# Patient Record
Sex: MALE | Race: WHITE | NOT HISPANIC OR LATINO | Employment: OTHER | URBAN - METROPOLITAN AREA
[De-identification: names, ages, dates, MRNs, and addresses within clinical notes are randomized per-mention and may not be internally consistent; named-entity substitution may affect disease eponyms.]

---

## 2017-01-04 ENCOUNTER — HOSPITAL ENCOUNTER (OUTPATIENT)
Dept: LAB | Facility: HOSPITAL | Age: 72
Setting detail: SPECIMEN
Discharge: HOME | End: 2017-01-04
Attending: NURSE PRACTITIONER | Admitting: NURSE PRACTITIONER

## 2017-01-04 LAB
ALBUMIN SERPL-MCNC: 3.8 G/DL (ref 3.5–4.8)
ALBUMIN/GLOB SERPL: 1.2 {RATIO} (ref 1–1.7)
ALP SERPL-CCNC: 119 IU/L (ref 32–91)
ALT SERPL-CCNC: 22 IU/L (ref 17–63)
ANION GAP SERPL CALC-SCNC: 13.1 MMOL/L (ref 10–20)
AST SERPL-CCNC: 18 IU/L (ref 15–41)
BILIRUB SERPL-MCNC: 0.7 MG/DL (ref 0.3–1.2)
BUN SERPL-MCNC: 16 MG/DL (ref 8–20)
BUN/CREAT SERPL: 14.5 (ref 6.2–20.3)
CALCIUM SERPL-MCNC: 9.1 MG/DL (ref 8.9–10.3)
CHLORIDE SERPL-SCNC: 105 MMOL/L (ref 101–111)
CHOLEST SERPL-MCNC: 155 MG/DL
CHOLEST/HDLC SERPL: 3.5 {RATIO}
CONV CO2: 26 MMOL/L (ref 22–32)
CONV LDL CHOLESTEROL DIRECT: 81 MG/DL (ref 0–100)
CONV MICROALBUM.,U,RANDOM: 147 MG/L
CONV TOTAL PROTEIN: 6.9 G/DL (ref 6.1–7.9)
CREAT 24H UR-MCNC: 184.3 MG/DL
CREAT UR-MCNC: 1.1 MG/DL (ref 0.7–1.2)
GLOBULIN UR ELPH-MCNC: 3.1 G/DL (ref 2.5–3.8)
GLUCOSE SERPL-MCNC: 178 MG/DL (ref 65–99)
HDLC SERPL-MCNC: 45 MG/DL
LDLC/HDLC SERPL: 1.8 {RATIO}
LIPID INTERPRETATION: ABNORMAL
MICROALBUMIN/CREAT UR: 79.8 UG/MG
POTASSIUM SERPL-SCNC: 4.1 MMOL/L (ref 3.6–5.1)
SODIUM SERPL-SCNC: 140 MMOL/L (ref 136–144)
TRIGL SERPL-MCNC: 173 MG/DL
VLDLC SERPL CALC-MCNC: 29.8 MG/DL

## 2017-04-06 ENCOUNTER — HOSPITAL ENCOUNTER (OUTPATIENT)
Dept: LAB | Facility: HOSPITAL | Age: 72
Setting detail: SPECIMEN
Discharge: HOME | End: 2017-04-06
Attending: INTERNAL MEDICINE | Admitting: INTERNAL MEDICINE

## 2017-04-06 LAB
ALBUMIN SERPL-MCNC: 3.5 G/DL (ref 3.5–4.8)
ALBUMIN/GLOB SERPL: 1.1 {RATIO} (ref 1–1.7)
ALP SERPL-CCNC: 106 IU/L (ref 32–91)
ALT SERPL-CCNC: 18 IU/L (ref 17–63)
ANION GAP SERPL CALC-SCNC: 12.2 MMOL/L (ref 10–20)
AST SERPL-CCNC: 17 IU/L (ref 15–41)
BILIRUB SERPL-MCNC: 0.4 MG/DL (ref 0.3–1.2)
BUN SERPL-MCNC: 16 MG/DL (ref 8–20)
BUN/CREAT SERPL: 14.5 (ref 6.2–20.3)
CALCIUM SERPL-MCNC: 9.4 MG/DL (ref 8.9–10.3)
CHLORIDE SERPL-SCNC: 106 MMOL/L (ref 101–111)
CONV CO2: 28 MMOL/L (ref 22–32)
CONV MICROALBUM.,U,RANDOM: 358 MG/L
CONV TOTAL PROTEIN: 6.7 G/DL (ref 6.1–7.9)
CREAT 24H UR-MCNC: 116 MG/DL
CREAT UR-MCNC: 1.1 MG/DL (ref 0.7–1.2)
GLOBULIN UR ELPH-MCNC: 3.2 G/DL (ref 2.5–3.8)
GLUCOSE SERPL-MCNC: 114 MG/DL (ref 65–99)
MICROALBUMIN/CREAT UR: 308.6 UG/MG
POTASSIUM SERPL-SCNC: 4.2 MMOL/L (ref 3.6–5.1)
SODIUM SERPL-SCNC: 142 MMOL/L (ref 136–144)
TSH SERPL-ACNC: 3.69 UIU/ML (ref 0.34–5.6)

## 2018-01-09 ENCOUNTER — HOSPITAL ENCOUNTER (OUTPATIENT)
Dept: CARDIOLOGY | Facility: HOSPITAL | Age: 73
Discharge: HOME | End: 2018-01-09
Attending: INTERNAL MEDICINE | Admitting: INTERNAL MEDICINE

## 2018-01-29 ENCOUNTER — HOSPITAL ENCOUNTER (OUTPATIENT)
Dept: PREADMISSION TESTING | Facility: HOSPITAL | Age: 73
Discharge: HOME | End: 2018-01-29
Attending: INTERNAL MEDICINE | Admitting: INTERNAL MEDICINE

## 2018-01-29 LAB
ANION GAP SERPL CALC-SCNC: 11.7 MMOL/L (ref 10–20)
APTT BLD: 24.3 SEC (ref 24–31)
BASOPHILS # BLD AUTO: 0 10*3/UL (ref 0–0.2)
BASOPHILS NFR BLD AUTO: 1 % (ref 0–2)
BUN SERPL-MCNC: 17 MG/DL (ref 8–20)
BUN/CREAT SERPL: 14.2 (ref 6.2–20.3)
CALCIUM SERPL-MCNC: 9.2 MG/DL (ref 8.9–10.3)
CHLORIDE SERPL-SCNC: 103 MMOL/L (ref 101–111)
CONV CO2: 28 MMOL/L (ref 22–32)
CREAT UR-MCNC: 1.2 MG/DL (ref 0.7–1.2)
DIFFERENTIAL METHOD BLD: (no result)
EOSINOPHIL # BLD AUTO: 0.3 10*3/UL (ref 0–0.3)
EOSINOPHIL # BLD AUTO: 4 % (ref 0–3)
ERYTHROCYTE [DISTWIDTH] IN BLOOD BY AUTOMATED COUNT: 13.5 % (ref 11.5–14.5)
GLUCOSE SERPL-MCNC: 187 MG/DL (ref 65–99)
HCT VFR BLD AUTO: 43.4 % (ref 40–54)
HGB BLD-MCNC: 14.7 G/DL (ref 14–18)
INR PPP: 1
LYMPHOCYTES # BLD AUTO: 1.7 10*3/UL (ref 0.8–4.8)
LYMPHOCYTES NFR BLD AUTO: 26 % (ref 18–42)
MCH RBC QN AUTO: 30.3 PG (ref 26–32)
MCHC RBC AUTO-ENTMCNC: 33.9 G/DL (ref 32–36)
MCV RBC AUTO: 89.5 FL (ref 80–94)
MONOCYTES # BLD AUTO: 0.8 10*3/UL (ref 0.1–1.3)
MONOCYTES NFR BLD AUTO: 12 % (ref 2–11)
NEUTROPHILS # BLD AUTO: 3.8 10*3/UL (ref 2.3–8.6)
NEUTROPHILS NFR BLD AUTO: 57 % (ref 50–75)
NRBC BLD AUTO-RTO: 0 /100{WBCS}
NRBC/RBC NFR BLD MANUAL: 0 10*3/UL
PLATELET # BLD AUTO: 166 10*3/UL (ref 150–450)
PMV BLD AUTO: 8.6 FL (ref 7.4–10.4)
POTASSIUM SERPL-SCNC: 4.7 MMOL/L (ref 3.6–5.1)
PROTHROMBIN TIME: 10.9 SEC (ref 9.6–11.7)
RBC # BLD AUTO: 4.85 10*6/UL (ref 4.6–6)
SODIUM SERPL-SCNC: 138 MMOL/L (ref 136–144)
WBC # BLD AUTO: 6.6 10*3/UL (ref 4.5–11.5)

## 2018-02-02 RX ORDER — LEVOTHYROXINE SODIUM 0.12 MG/1
125 TABLET ORAL DAILY
COMMUNITY

## 2018-02-02 RX ORDER — LANSOPRAZOLE 30 MG/1
30 CAPSULE, DELAYED RELEASE ORAL DAILY
Status: ON HOLD | COMMUNITY
End: 2020-08-13

## 2018-02-02 RX ORDER — ATORVASTATIN CALCIUM 40 MG/1
40 TABLET, FILM COATED ORAL NIGHTLY
COMMUNITY

## 2018-02-02 RX ORDER — CLOPIDOGREL BISULFATE 75 MG/1
75 TABLET ORAL DAILY
COMMUNITY
End: 2019-09-10 | Stop reason: HOSPADM

## 2018-02-02 RX ORDER — AMLODIPINE BESYLATE 10 MG/1
5 TABLET ORAL DAILY
COMMUNITY
End: 2020-01-17

## 2018-02-02 RX ORDER — METFORMIN HYDROCHLORIDE 500 MG/1
1000 TABLET, EXTENDED RELEASE ORAL 2 TIMES DAILY
Status: ON HOLD | COMMUNITY
End: 2020-08-13

## 2018-02-02 RX ORDER — GLIMEPIRIDE 2 MG/1
2 TABLET ORAL
Status: ON HOLD | COMMUNITY
End: 2021-08-26

## 2018-02-02 RX ORDER — ASPIRIN 81 MG/1
81 TABLET ORAL DAILY
COMMUNITY
End: 2020-01-24

## 2018-02-02 RX ORDER — ISOSORBIDE MONONITRATE 30 MG/1
90 TABLET, EXTENDED RELEASE ORAL DAILY
COMMUNITY
End: 2020-02-28

## 2018-02-02 RX ORDER — LANOLIN ALCOHOL/MO/W.PET/CERES
1000 CREAM (GRAM) TOPICAL DAILY
COMMUNITY

## 2018-02-12 ENCOUNTER — OFFICE VISIT (OUTPATIENT)
Dept: CARDIAC SURGERY | Facility: CLINIC | Age: 73
End: 2018-02-12

## 2018-02-12 VITALS
OXYGEN SATURATION: 96 % | DIASTOLIC BLOOD PRESSURE: 80 MMHG | SYSTOLIC BLOOD PRESSURE: 122 MMHG | WEIGHT: 241 LBS | HEART RATE: 62 BPM

## 2018-02-12 DIAGNOSIS — Z95.1 H/O CORONARY ARTERY BYPASS SURGERY: Primary | ICD-10-CM

## 2018-02-12 PROCEDURE — 99024 POSTOP FOLLOW-UP VISIT: CPT | Performed by: THORACIC SURGERY (CARDIOTHORACIC VASCULAR SURGERY)

## 2018-02-12 RX ORDER — MELATONIN
2000 NIGHTLY
COMMUNITY

## 2018-02-13 NOTE — PROGRESS NOTES
[facility] CONSULT    Reason for Consultation: Possible redo surgical revascularization.    History of Present Illness:     This is a very pleasant 72 year old man who underwent a coronary bypass surgery, with a LIMA to LAD, over 20 years ago. Two years ago he again suffered angina and received a stent to his circumflex artery. Ten months ago, he began experiencing significant dyspnea, even on minor exertion. Left heart catheterization, performed on 1/30/18, showed progressive CAD; the RCA is occluded proximally and fills by L to R collaterals, there is a 90% lesion involving the proximal LAD, and there is a 70% in the main circumflex after takeoff of a large OM branch (also has received a stent). The patient's original LIMA is patient and fills the distal half of the LAD distribution. The course of the LIMA is exceedingly closed to the sternotomy wires.    The patient was referred to us for possible redo surgical revascularization.    Review of Systems   Constitutional: Positive for activity change and fatigue. Negative for appetite change, chills, diaphoresis, fever and unexpected weight change.   HENT: Negative for congestion, dental problem, hearing loss, mouth sores, nosebleeds, postnasal drip, rhinorrhea, sore throat and trouble swallowing.    Eyes: Negative for photophobia and visual disturbance.   Respiratory: Negative for apnea, cough, choking, chest tightness, shortness of breath, wheezing and stridor.    Cardiovascular: Negative for chest pain, palpitations and leg swelling.   Gastrointestinal: Negative for abdominal distention, abdominal pain, constipation, diarrhea, nausea and vomiting.   Endocrine: Negative for cold intolerance, heat intolerance and polydipsia.   Genitourinary: Negative for dysuria, hematuria and urgency.   Musculoskeletal: Negative for arthralgias, back pain, gait problem, joint swelling, myalgias, neck pain and neck stiffness.   Skin: Negative for color change, pallor, rash and wound.    Allergic/Immunologic: Negative for environmental allergies, food allergies and immunocompromised state.   Neurological: Negative for tremors, seizures, syncope, facial asymmetry, speech difficulty, weakness, light-headedness, numbness and headaches.   Hematological: Negative for adenopathy. Does not bruise/bleed easily.   Psychiatric/Behavioral: Negative for agitation, behavioral problems, confusion, decreased concentration, dysphoric mood, hallucinations, self-injury, sleep disturbance and suicidal ideas. The patient is not nervous/anxious and is not hyperactive.         Past Medical History:   Diagnosis Date   • Coronary artery disease    • Diabetes mellitus    • Hyperlipidemia    • Hypertension    • Hypothyroidism    • Stroke     X2     Past Surgical History:   Procedure Laterality Date   • CORONARY ANGIOPLASTY     • CORONARY ARTERY BYPASS GRAFT  1996    X3     Family History   Problem Relation Age of Onset   • Cancer Mother    • Heart valve disorder Father      Social History   Substance Use Topics   • Smoking status: Former Smoker   • Smokeless tobacco: Never Used   • Alcohol use Yes       (Not in a hospital admission)    Current Outpatient Prescriptions:   •  amLODIPine (NORVASC) 5 MG tablet, Take 5 mg by mouth Daily., Disp: , Rfl:   •  aspirin 81 MG EC tablet, Take 81 mg by mouth Daily., Disp: , Rfl:   •  atorvastatin (LIPITOR) 40 MG tablet, Take 40 mg by mouth Daily., Disp: , Rfl:   •  cholecalciferol (VITAMIN D3) 1000 units tablet, Take 1,000 Units by mouth Daily., Disp: , Rfl:   •  clopidogrel (PLAVIX) 75 MG tablet, Take 75 mg by mouth Daily., Disp: , Rfl:   •  glimepiride (AMARYL) 2 MG tablet, Take 2 mg by mouth Every Morning Before Breakfast., Disp: , Rfl:   •  Insulin Aspart (NOVOLOG SC), Inject  under the skin., Disp: , Rfl:   •  insulin NPH (humuLIN N,novoLIN N) 100 UNIT/ML injection, Inject  under the skin 2 (Two) Times a Day Before Meals., Disp: , Rfl:   •  isosorbide mononitrate (IMDUR) 30 MG 24  hr tablet, Take 30 mg by mouth Daily., Disp: , Rfl:   •  lansoprazole (PREVACID) 30 MG capsule, Take 30 mg by mouth Daily., Disp: , Rfl:   •  levothyroxine (SYNTHROID, LEVOTHROID) 125 MCG tablet, Take 125 mcg by mouth Daily., Disp: , Rfl:   •  metFORMIN ER (GLUCOPHAGE-XR) 500 MG 24 hr tablet, Take 500 mg by mouth 2 (Two) Times a Day., Disp: , Rfl:   •  vitamin B-12 (CYANOCOBALAMIN) 1000 MCG tablet, Take 1,000 mcg by mouth Daily., Disp: , Rfl:     Allergies:  Peanut-containing drug products    Objective      Vital Signs  Heart Rate:  [62] 62  BP: (122)/(80) 122/80    Flowsheet Rows         First Filed Value    Admission Height      Admission Weight  109 kg (241 lb) Documented at 02/12/2018 1221             Physical Exam   Constitutional: He is oriented to person, place, and time. He appears well-developed and well-nourished. No distress.   HENT:   Head: Normocephalic and atraumatic.   Mouth/Throat: No oropharyngeal exudate.   Eyes: EOM are normal. Pupils are equal, round, and reactive to light. No scleral icterus.   Neck: Normal range of motion. Neck supple. No JVD present. No tracheal deviation present. No thyromegaly present.   Cardiovascular: Normal rate.  Exam reveals no gallop and no friction rub.    Murmur heard.  Pulmonary/Chest: Effort normal. No respiratory distress. He has no wheezes. He has no rales. He exhibits no tenderness.   Abdominal: Soft. Bowel sounds are normal. He exhibits no distension and no mass. There is no tenderness. No hernia.   Musculoskeletal: He exhibits no edema or deformity.   Lymphadenopathy:     He has no cervical adenopathy.   Neurological: He is alert and oriented to person, place, and time. No cranial nerve deficit.   Skin: Skin is warm and dry. No rash noted. He is not diaphoretic. No erythema. No pallor.   Psychiatric: He has a normal mood and affect. His behavior is normal. Judgment and thought content normal.   Vitals reviewed.      Results Review:       Assessment/Plan:  This  is a pleasant 72 year old man with progressie CAD; only his LIMA to LAD is patient. Given his LIMA patency and the significant risk of LIMA injury on re-entry, I feel Mr Heredia is a poor surgical candidate. In addition, he has no discernable venous conduit available. I will discuss the option of further PCI with Dr. Rodriguez.      Jens Heller MD  02/12/18  11:39 PM

## 2018-07-17 ENCOUNTER — HOSPITAL ENCOUNTER (OUTPATIENT)
Dept: LAB | Facility: HOSPITAL | Age: 73
Discharge: HOME | End: 2018-07-17
Attending: INTERNAL MEDICINE | Admitting: INTERNAL MEDICINE

## 2019-06-05 ENCOUNTER — CONVERSION ENCOUNTER (OUTPATIENT)
Dept: FAMILY MEDICINE CLINIC | Facility: CLINIC | Age: 74
End: 2019-06-05

## 2019-06-05 VITALS
OXYGEN SATURATION: 96 % | HEART RATE: 72 BPM | SYSTOLIC BLOOD PRESSURE: 146 MMHG | WEIGHT: 243.4 LBS | HEIGHT: 71 IN | DIASTOLIC BLOOD PRESSURE: 78 MMHG | BODY MASS INDEX: 34.07 KG/M2

## 2019-06-06 NOTE — PROGRESS NOTES
Visit Type:  Follow-up Visit  Primary Care Provider:  David Melchor M.D.      History of Present Illness:  Followup 3 months - Metoprolol decreased milligram in March due to bradycardia  Pt not taking Ranexa/ cost containment / advised generic available.       History of Present Illness:    Dear Dr Melchor    Mr. Carroll Rodgers is a very pleasant 73 years old, gentleman with history of coronary artery disease, status post previous CABG in , diabetes, dyslipidemia, and hypertension.     He continues to have mild angina pectoris on minimal exertion with stable pattern.  He also complains of Fatigue and shortness of breath on exertion which has worsened.    .  Dr.Aftab Jens Heller cardiovascular surgeon evaluated him and  reviewed his cardiac catheterization films and thought that he was not  a good candidate for surgery because of significant danger of injury to the LIMA at the time of reoperation       PCI stenting with high risk has been considered   but patient has opted for medical therapy.  He does have LV systolic dysfunction with ejection fraction of 40%.  He is enrolled in E[facility]P program  and is still waiting.  Will increase his isosorbide to960   mg daily.    Ranexa has not helped him  and he has stopped taking him      A/P  1- CAd. previous CABG.  Extensive coronary artery disease.  Recurrent angina pectoris.  EECP can be considered.  Will further optimize his antianginal therapy by increasing isosorbide.further to 90 mg daily.  2-  bradycardia   Heart rate today was 72. His metoprolol was  decreased during his last visit.  3-  dyslipidemia.  Will check his lipid profile on his return visit.    Thank you very much for allowing us to participate the care of patients      Vital Signs:    Patient Profile:    73 Years Old Male  Height:     71 inches (180.34 cm)  Weight:     243.4 pounds  BMI:        33.94     O2 Sat:     96 %  Pulse rate: 72 / minute  BP Sittin / 78  (left arm)    Cuff size:   regular      Problems: Active problems were reviewed with the patient during this visit.  Medications: Medications were reviewed with the patient during this visit.  Allergies: Allergies were reviewed with the patient during this visit.        Vitals Entered By: Carole Mcghee LPN (2019 9:14 AM)      Past Medical History:     Reviewed history from 2015 and no changes required:        Diabetes Mellitus type 2         Hypertension        Thyroid Disorder         Coronary Artery Disease : S/P  PCI stent x5 - Cabg         Hyperlipidemia        C V A / Stroke  x 2    Past Surgical History:     Reviewed history from 2018 and no changes required:        right wrist surgery - screw         Left ankle surgery  - screw        Heart Catherizations: last done 7-1-15 at [facility] : 2018 at [facility]         C A B   3 Vessel Bypass          PCI x 5 : last done 7-1-15 at [facility] - Xience Alpine (drug-eluting stent) x 2 to RCA and Xience Alpine stent x 1 to mid circumflex         Cataract Extraction    Active Medications (reviewed today):  NITROSTAT 0.4 MG SUBLINGUAL TABLET SUBLINGUAL (NITROGLYCERIN) Take once a day by mouth.  ISOSORBIDE MONONITRATE ER 60 MG ORAL TABLET EXTENDED RELEASE 24 HOUR (ISOSORBIDE MONONITRATE) Take once a day by mouth.  MULTIVITAMINS TABS (MULTIPLE VITAMIN) Take one (1) tablet by mouth daily.  RANEXA 1000 MG ORAL TABLET EXTENDED RELEASE 12 HOUR (RANOLAZINE) Take one (1) tablet by mouth twice a day  NOVOLIN R SOLUTION (INSULIN REGULAR HUMAN SOLN) AC  B-12 1000 MCG ORAL CAPSULE (CYANOCOBALAMIN) Take one (1) tablet by mouth daily.  VITAMIN D3 2000 UNIT ORAL CAPSULE (CHOLECALCIFEROL) Take 1 tablet by mouth daily  METOPROLOL TARTRATE 50 MG ORAL TABLET (METOPROLOL TARTRATE) Take one (1) tablet by mouth twice a day  GLIMEPIRIDE 2 MG ORAL TABLET (GLIMEPIRIDE) Take 1 tablet by mouth daily  BD PEN NEEDLE KENROY U/F 32G X 4 MM (INSULIN PEN NEEDLE) use 5 times a day. E11.65  METFORMIN HCL   MG ORAL TABLET EXTENDED RELEASE 24 HOUR (METFORMIN HCL) Take one (1) tablet by mouth twice a day  NOVOLOG FLEXPEN 100 UNIT/ML SUBCUTANEOUS SOLUTION PEN-INJECTOR (INSULIN ASPART) 14 untis before breakfast and lunch, 18 units before supper. 1:30>140.  NOVOLIN N SUSPENSION (INSULIN NPH HUMAN (ISOPHANE) SUSP) BID  LIPITOR 40 MG ORAL TABLET (ATORVASTATIN CALCIUM) Take 1 tablet by mouth daily  NITROSTAT 0.4 MG SUBLINGUAL TABLET SUBLINGUAL (NITROGLYCERIN) use as directed  AMLODIPINE BESYLATE 5 MG ORAL TABLET (AMLODIPINE BESYLATE) Take once a day by mouth.  LISINOPRIL-HYDROCHLOROTHIAZIDE 20-12.5 MG ORAL TABLET (LISINOPRIL-HYDROCHLOROTHIAZIDE) Take one (1) tablet by mouth daily.  PREVACID 30 MG ORAL CAPSULE DELAYED RELEASE (LANSOPRAZOLE) Take 1 tablet by mouth daily prn  ASPIRIN 81 MG ORAL TABLET (ASPIRIN) Take 1 tablet by mouth daily  LEVOTHYROXINE SODIUM 112 MCG ORAL TABLET (LEVOTHYROXINE SODIUM) Take 1 tablet by mouth daily  PLAVIX 75 MG ORAL TABLET (CLOPIDOGREL BISULFATE) Take 1 tablet by mouth daily    Current Allergies (reviewed today):  * PEANUTS (Critical)    Family History Summary:      Reviewed history Last on 03/06/2019 and no changes required:06/05/2019      General Comments - FH:  FH Cancer-Mother   FH Heart Disease- Father   FH Diabetes- Mother    Social History:     Reviewed history from 01/21/2015 and no changes required:        Marital Status:         Children: 2        Occupation: Reitred        Risk Factors:     Smoked Tobacco Use:  Former smoker     Cigarettes:  Yes -- 3 pack(s) per day,      Year started:  age 13         Year quit:  35 yrs ago   Smokeless Tobacco Use:  Never  Passive smoke exposure:  no  Drug use:  no  Caffeine use:  4 drinks per day  Alcohol use:  yes     Type:  Occasionally - beer      Has patient --        Felt need to cut down:  no        Been annoyed by complaints:  no        Felt guilty about drinking:  no        Needed eye opener in the morning:  no     Counseled to  quit/cut down alcohol use:  no  Exercise:  yes     Times per week:  4     Type of Exercise:  walking    Family History Risk Factors:     Family History of MI in females < 65 years old:  no     Family History of MI in males < 55 years old:  no        Review of Systems   General: denies fevers, chills, sweats, anorexia, fatigue, malaise, weight loss  Eyes: denies blurring, diplopia, irritation, discharge, vision loss, eye pain, photophobia  Ear/Nose/Throat: denies ear pain or discharge, tinnitus, decreased hearing, nasal obstruction or discharge, nosebleeds, sore throat, hoarseness, dysphagia  Cardiovascular: Coronary artery disease.  Previous CABG.  PCI stenting of 1 of the saphenous vein grafts in 2015  Respiratory: Denies cough, dyspnea, excessive sputum, hemoptysis, wheezing  Gastrointestinal: Denies nausea, vomiting, diarrhea  Genitourinary: Erectile dysfunction  Musculoskeletal: denies back pain, joint pain, joint swelling, muscle cramps, muscle weakness, stiffness, arthritis  Skin: Denies dry skin, hair loss  Neurologic: denies transient paralysis, weakness, paresthesias, seizures, syncope, tremors, vertigo  Psychiatric: Denies anxiety, depression  Endocrine: Denies cold intolerance, heat intolerance  Hematologic/Lymphatic: Denies bleeding, abnormal bruising, fevers      Physical Exam    General:      well developed, well nourished, in no acute distress.    Neck:      no masses, thyromegaly, or abnormal cervical nodes.   no JVD. No carotid bruits  Lungs:      clear bilaterally to auscultation.    Heart:      non-displaced PMI, chest non-tender; regular rate and rhythm, S1, S2 without murmurs, rubs, or gallops  Pulses:      pulses normal in all 4 extremities.    Extremities:       no edema    Diabetes Management Exam:      Foot Exam (with socks and/or shoes not present):        Pulses:           pulses normal in all 4 extremities.        Blood Pressure:  Today's BP: 146/78 mm Hg    Labwork:   Most Recent Lab  Results:   LDL: 81 mg/dL 01/04/2017  HbA1c: : 8.3 % 04/06/2017        Plan   New medications:  METOPROLOL TARTRATE 50 MG ORAL TABLET -- Take 1 tablet by mouth daily  Start date: 08/22/2017  AMLODIPINE BESYLATE 5 MG ORAL TABLET -- Take 1/2  tablet by mouth daily (2.5mg)  Start date: 02/11/2015           Patient Instructions:  1)    Lipid profile on return visit in 6 months  2)  Please schedule a follow-up appointment in 6 months.                  Medication Administration    Orders Added:  1)  Ofc Vst, Est Level III [81939]  2)  Ofc Vst, Est Level III [48379]    ]      Electronically signed by SHANELL uLtz MD on 06/05/2019 at 9:53 AM  ________________________________________________________________________       Disclaimer: Converted Note message may not contain all data elements that existed in the [employer] source system. Please see [employer] for the original note details.

## 2019-08-21 ENCOUNTER — OFFICE VISIT (OUTPATIENT)
Dept: CARDIOLOGY | Facility: CLINIC | Age: 74
End: 2019-08-21

## 2019-08-21 VITALS
SYSTOLIC BLOOD PRESSURE: 150 MMHG | DIASTOLIC BLOOD PRESSURE: 70 MMHG | OXYGEN SATURATION: 97 % | HEART RATE: 60 BPM | BODY MASS INDEX: 34.44 KG/M2 | HEIGHT: 71 IN | RESPIRATION RATE: 20 BRPM | WEIGHT: 246 LBS

## 2019-08-21 DIAGNOSIS — R07.9 CHEST PAIN, UNSPECIFIED TYPE: ICD-10-CM

## 2019-08-21 DIAGNOSIS — I20.0 UNSTABLE ANGINA (HCC): ICD-10-CM

## 2019-08-21 DIAGNOSIS — I25.83 CORONARY ARTERY DISEASE DUE TO LIPID RICH PLAQUE: ICD-10-CM

## 2019-08-21 DIAGNOSIS — E78.2 MIXED HYPERLIPIDEMIA: ICD-10-CM

## 2019-08-21 DIAGNOSIS — I10 ESSENTIAL HYPERTENSION: ICD-10-CM

## 2019-08-21 DIAGNOSIS — Z79.4 TYPE 2 DIABETES MELLITUS WITHOUT COMPLICATION, WITH LONG-TERM CURRENT USE OF INSULIN (HCC): ICD-10-CM

## 2019-08-21 DIAGNOSIS — R06.09 DYSPNEA ON EXERTION: Primary | ICD-10-CM

## 2019-08-21 DIAGNOSIS — I25.10 CORONARY ARTERY DISEASE DUE TO LIPID RICH PLAQUE: ICD-10-CM

## 2019-08-21 DIAGNOSIS — E03.9 ACQUIRED HYPOTHYROIDISM: ICD-10-CM

## 2019-08-21 DIAGNOSIS — E11.9 TYPE 2 DIABETES MELLITUS WITHOUT COMPLICATION, WITH LONG-TERM CURRENT USE OF INSULIN (HCC): ICD-10-CM

## 2019-08-21 PROCEDURE — 99205 OFFICE O/P NEW HI 60 MIN: CPT | Performed by: INTERNAL MEDICINE

## 2019-08-21 PROCEDURE — 93000 ELECTROCARDIOGRAM COMPLETE: CPT | Performed by: INTERNAL MEDICINE

## 2019-08-21 RX ORDER — LISINOPRIL AND HYDROCHLOROTHIAZIDE 25; 20 MG/1; MG/1
1 TABLET ORAL DAILY
Status: ON HOLD | COMMUNITY
End: 2021-08-26

## 2019-08-21 RX ORDER — METOPROLOL TARTRATE 50 MG/1
25 TABLET, FILM COATED ORAL 2 TIMES DAILY
COMMUNITY
End: 2019-09-10 | Stop reason: HOSPADM

## 2019-08-22 PROBLEM — I20.0 UNSTABLE ANGINA: Status: ACTIVE | Noted: 2019-08-22

## 2019-08-22 NOTE — PROGRESS NOTES
Subjective:     Encounter Date:08/21/2019      Patient ID: Carroll Rodgers is a 74 y.o. male.    Chief Complaint:  Chief Complaint   Patient presents with   • Coronary Artery Disease   • Hypertension   • Hyperlipidemia   • Diabetes       HPI:  Carroll is a very pleasant 74-year-old patient self-referred.  He is previously seen cardiologist in [facility] in the past most recently was Dr. Lutz.   he has known coronary artery disease and is status post coronary artery bypass grafting in 1996 with a LIMA to the LAD and SVG to the RCA and circumflex artery.  He subsequently had total occlusion of his SVG graft to the obtuse marginal branch and underwent stenting with a drug-eluting stent 7/1/2015 as well as his SVG graft to the right coronary artery.  Subsequently he developed exertional and rest angina with his worst symptom being exertional dyspnea.  Cardiac catheterization performed 1/30/2018 showed critical stenosis of the jailed segment of the inferior branch of the circumflex obtuse marginal branch with what appeared to be 70% in-stent restenosis of the superior parent limb of the circumflex into the superior limb.  He was told that medical therapy was his best option.  Since then he has had worsening angina on Ranexa and worsening dyspnea.  He comes to me for second opinion    I personally reviewed the cardiac catheterization images from 1/30/2018 and confirmed the above results.  In addition he has a patent LIMA to the LAD with a distal apical 80% narrowing that is inconsequential.  He has a chronic total occlusion of his SVG graft to the his right coronary artery and again an occlusion of his SVG to the OM.  The circumflex OM native vessel is as described above.  His ECG today in the office showed normal sinus rhythm with APCs and a old left bundle branch block.  He has had no recent echo.    His chest pain syndrome is that of exertional angina often if not always associated with dyspnea with chronic stable  angina class III symptoms versus unstable angina given the increase in frequency.  He does not have associated nausea or diaphoresis.    The following portions of the patient's history were reviewed and updated as appropriate: allergies, current medications, past family history, past medical history, past social history, past surgical history and problem list.    Problem List:  Patient Active Problem List   Diagnosis   • Type 2 diabetes mellitus without complication, with long-term current use of insulin (CMS/[facility])   • Mixed hyperlipidemia   • Essential hypertension   • Acquired hypothyroidism   • Coronary artery disease due to lipid rich plaque       Past Medical History:  Past Medical History:   Diagnosis Date   • Coronary artery disease    • Diabetes mellitus (CMS/[facility])    • Hyperlipidemia    • Hypertension    • Hypothyroidism    • Stroke (CMS/[facility])     X2       Past Surgical History:  Past Surgical History:   Procedure Laterality Date   • CARDIAC CATHETERIZATION  01/30/2018    EF 40%, Distal LM 50% stenosis, Proximal LM 50% stenosis, Proximal LAD 95% stenosis, Mid % stenosis, Apical LAD 70% stenosis, Significant ISR at bifurcation, 90% stenosis at circumflex portoin, 90% stenosis at ostial OM, distal circumflex 80% stenosis, Mid % stenosis, SVG to % occluded at ostium, SVG to OM branches 100% occluded, LIMA to LAD has no significant disease   • CORONARY ARTERY BYPASS GRAFT  1996    X3   • CORONARY STENT PLACEMENT  07/01/2015    [facility] [employer] LARISA to RCA (X2) and Mid Circ (X1)-Dr. Rodriguez   • CORONARY STENT PLACEMENT  2006   • CORONARY STENT PLACEMENT  2010       Social History:  Social History     Socioeconomic History   • Marital status:      Spouse name: Not on file   • Number of children: Not on file   • Years of education: Not on file   • Highest education level: Not on file   Tobacco Use   • Smoking status: Former Smoker   • Smokeless tobacco: Never Used   Substance and Sexual  "Activity   • Alcohol use: Yes   • Drug use: No       Allergies:  Allergies   Allergen Reactions   • Peanut-Containing Drug Products          Review of Symptoms:  Constitutional: Patient afebrile no chills or unexpected weight changes  Respiratory: No cough, no wheezing does complain of dyspnea  Cardiovascular: Does complain chest pain, without palpitations, but associated dyspnea, orthopnea and no edema  Gastrointestinal: No nausea, vomiting, constipation or diarrhea.  No melena or dark stools    All other systems reviewed and are negative             Objective:         /70 (BP Location: Left arm, Patient Position: Sitting, Cuff Size: Large Adult)   Pulse 60   Resp 20   Ht 180.3 cm (71\")   Wt 112 kg (246 lb)   SpO2 97%   BMI 34.31 kg/m²     Physical exam  Constitutional: well-nourished, and appears stated age in no acute distress  PERRL: Conjunctiva clear, no pallor, anicteric  HENMT: normocephalic, normal dentition, no cyanosis or pallor  Neck:no bruits, or thrills and bilateral normal carotid upstroke. Normal jugular venous pressure  Cardiovascular: No parasternal heaves an non-displaced focal PMI. Normal rate and rhythm: no rub, gallop, murmur or click and normal S1 and S2; no lower or upper extremity edema.   Lungs: unlabored, no wheezing with no rales or rhonchi on auscultation.  Extremities: Warm, no clubbing, cyanosis, or edema. Full and equal peripheral pulses in extremities with no bruits appreciated.   Abdomen: soft, non-tender, non-distended  Musculoskeletal: no joint tenderness or swelling and no erythema  Skin: Warm and dry, non-erythematous   Neuro:alert and normal affect. Oriented to time, place and person.       In-Office Procedure(s):    ECG 12 Lead  Date/Time: 8/21/2019 1:22 PM  Performed by: Gonzalez Ochoa MD  Authorized by: Gonzalez Ochoa MD   Comparison: not compared with previous ECG   Previous ECG: no previous ECG available  Rhythm: sinus rhythm  Comments: " ECG today in the office showed normal sinus rhythm with APCs and a old left bundle branch block.             ASCVD RIsk Score::  The 10-year ASCVD risk score (Alecmerrick RODRIGUEZ Jr., et al., 2013) is: 55.7%    Values used to calculate the score:      Age: 74 years      Sex: Male      Is Non- : No      Diabetic: Yes      Tobacco smoker: No      Systolic Blood Pressure: 150 mmHg      Is BP treated: Yes      HDL Cholesterol: 45 mg/dL      Total Cholesterol: 155 mg/dL    Recent Radiology:  Imaging Results (most recent)     None          Lab Review:   No visits with results within 2 Month(s) from this visit.   Latest known visit with results is:   Hospital Outpatient Visit on 01/29/2018   Component Date Value   • WBC 01/29/2018 6.6    • RBC 01/29/2018 4.85    • Hemoglobin 01/29/2018 14.7    • Hematocrit 01/29/2018 43.4    • MCV 01/29/2018 89.5    • MCH 01/29/2018 30.3    • MCHC 01/29/2018 33.9    • RDW 01/29/2018 13.5    • Platelets 01/29/2018 166    • MPV 01/29/2018 8.6    • Differential Type 01/29/2018 AUTO    • Neutrophils Absolute 01/29/2018 3.8    • Lymphocytes Absolute 01/29/2018 1.7    • Monocytes Absolute 01/29/2018 0.8    • Eosinophils Absolute 01/29/2018 0.3    • Basophils Absolute 01/29/2018 0.0    • Neutrophil Rel % 01/29/2018 57    • Lymphocyte Rel % 01/29/2018 26    • Monocyte Rel % 01/29/2018 12*   • Eosinophil Rel % 01/29/2018 4*   • Basophil Rel % 01/29/2018 1    • nRBC 01/29/2018 0    • Absolute nRBC 01/29/2018 0    • Sodium 01/29/2018 138    • Potassium 01/29/2018 4.7    • Chloride 01/29/2018 103    • CO2 01/29/2018 28    • Glucose 01/29/2018 187*   • BUN 01/29/2018 17    • Creatinine 01/29/2018 1.2    • Anion Gap 01/29/2018 11.7    • BUN/Creatinine Ratio 01/29/2018 14.2    • GFR MDRD Non  Birdie* 01/29/2018 59*   • GFR MDRD  01/29/2018 >60    • Calcium 01/29/2018 9.2    • Protime 01/29/2018 10.9    • INR 01/29/2018 1.0    • PTT 01/29/2018 24.3               Invalid  input(s): ALKPO4                        Invalid input(s): LDLCALC                Assessment:          Diagnosis Plan   1. Dyspnea on exertion  Adult Transthoracic Echo Complete W/ Cont if Necessary Per Protocol   2. Coronary artery disease due to lipid rich plaque     3. Essential hypertension     4. Mixed hyperlipidemia     5. Type 2 diabetes mellitus without complication, with long-term current use of insulin (CMS/HCC)     6. Acquired hypothyroidism            Plan:         1. Dyspnea on exertion  Likely an anginal equivalent.  Will rule out severe LV systolic dysfunction or valvular heart disease as the etiology before PCI and stenting.  - Adult Transthoracic Echo Complete W/ Cont if Necessary Per Protocol; Future    2. Coronary artery disease due to lipid rich plaque  Culprit likely LONNIE bifurcating stenosis.   -will attempt LASER atherectomy and bifurcation stenting of OM1.    3. Essential hypertension  Well controlled on medical therapy.    4. Mixed hyperlipidemia  Well controlled on medical therapy.    5. Type 2 diabetes mellitus without complication, with long-term current use of insulin (CMS/HCC)  Well controlled on medical therapy.    6. Acquired hypothyroidism  Stable.    Greater than 60 minutes of face-to-face time was spent with the patient and family, of which greater than 50% was spent counseling specifically the risks and benefits of acute microinfarction given his unstable angina in the setting of known critical stenosis of his circumflex artery.  Patient knows to report to the ER should his chest pain recur.        Level of Care:                 Gonzalez Ochoa MD  08/22/19  .

## 2019-08-23 ENCOUNTER — HOSPITAL ENCOUNTER (OUTPATIENT)
Dept: CARDIOLOGY | Facility: HOSPITAL | Age: 74
Discharge: HOME | End: 2019-08-23
Admitting: INTERNAL MEDICINE

## 2019-08-23 VITALS
WEIGHT: 245 LBS | DIASTOLIC BLOOD PRESSURE: 80 MMHG | SYSTOLIC BLOOD PRESSURE: 160 MMHG | BODY MASS INDEX: 34.3 KG/M2 | HEIGHT: 71 IN

## 2019-08-23 DIAGNOSIS — R06.09 DYSPNEA ON EXERTION: ICD-10-CM

## 2019-08-23 PROCEDURE — 93306 TTE W/DOPPLER COMPLETE: CPT

## 2019-08-23 PROCEDURE — 93306 TTE W/DOPPLER COMPLETE: CPT | Performed by: INTERNAL MEDICINE

## 2019-08-23 PROCEDURE — 25010000002 SULFUR HEXAFLUORIDE MICROSPH 60.7-25 MG RECONSTITUTED SUSPENSION: Performed by: INTERNAL MEDICINE

## 2019-08-23 RX ADMIN — SULFUR HEXAFLUORIDE 3 ML: KIT at 12:30

## 2019-08-27 LAB
BH CV ECHO MEAS - EF(MOD-BP): 54 %
BH CV ECHO MEAS - LA DIMENSION(2D): 4.4 CM
MAXIMAL PREDICTED HEART RATE: 145 BPM
STRESS TARGET HR: 123 BPM

## 2019-08-28 ENCOUNTER — TELEPHONE (OUTPATIENT)
Dept: CARDIOLOGY | Facility: CLINIC | Age: 74
End: 2019-08-28

## 2019-08-28 DIAGNOSIS — Z01.818 PRE-OP TESTING: ICD-10-CM

## 2019-08-28 DIAGNOSIS — I20.0 UNSTABLE ANGINA PECTORIS (HCC): ICD-10-CM

## 2019-08-28 DIAGNOSIS — I25.10 CORONARY ARTERY DISEASE DUE TO LIPID RICH PLAQUE: Primary | ICD-10-CM

## 2019-08-28 DIAGNOSIS — I25.83 CORONARY ARTERY DISEASE DUE TO LIPID RICH PLAQUE: Primary | ICD-10-CM

## 2019-08-29 PROBLEM — I20.0 UNSTABLE ANGINA PECTORIS (HCC): Status: ACTIVE | Noted: 2019-08-29

## 2019-09-05 ENCOUNTER — LAB (OUTPATIENT)
Dept: LAB | Facility: HOSPITAL | Age: 74
End: 2019-09-05

## 2019-09-05 DIAGNOSIS — Z01.818 PRE-OP TESTING: ICD-10-CM

## 2019-09-05 DIAGNOSIS — I25.83 CORONARY ARTERY DISEASE DUE TO LIPID RICH PLAQUE: ICD-10-CM

## 2019-09-05 DIAGNOSIS — I25.10 CORONARY ARTERY DISEASE DUE TO LIPID RICH PLAQUE: ICD-10-CM

## 2019-09-05 LAB
ALBUMIN SERPL-MCNC: 3.8 G/DL (ref 3.5–4.8)
ALBUMIN/GLOB SERPL: 1.1 G/DL (ref 1–1.7)
ALP SERPL-CCNC: 123 U/L (ref 32–91)
ALT SERPL W P-5'-P-CCNC: 22 U/L (ref 17–63)
ANION GAP SERPL CALCULATED.3IONS-SCNC: 18.2 MMOL/L (ref 5–15)
AST SERPL-CCNC: 20 U/L (ref 15–41)
BASOPHILS # BLD AUTO: 0 10*3/MM3 (ref 0–0.2)
BASOPHILS NFR BLD AUTO: 0.8 % (ref 0–1.5)
BILIRUB SERPL-MCNC: 0.7 MG/DL (ref 0.3–1.2)
BUN BLD-MCNC: 13 MG/DL (ref 8–20)
BUN/CREAT SERPL: 10.8 (ref 6.2–20.3)
CALCIUM SPEC-SCNC: 9.2 MG/DL (ref 8.9–10.3)
CHLORIDE SERPL-SCNC: 104 MMOL/L (ref 101–111)
CO2 SERPL-SCNC: 24 MMOL/L (ref 22–32)
CREAT BLD-MCNC: 1.2 MG/DL (ref 0.7–1.2)
DEPRECATED RDW RBC AUTO: 41.6 FL (ref 37–54)
EOSINOPHIL # BLD AUTO: 0.3 10*3/MM3 (ref 0–0.4)
EOSINOPHIL NFR BLD AUTO: 5.1 % (ref 0.3–6.2)
ERYTHROCYTE [DISTWIDTH] IN BLOOD BY AUTOMATED COUNT: 13.2 % (ref 12.3–15.4)
GFR SERPL CREATININE-BSD FRML MDRD: 59 ML/MIN/1.73
GLOBULIN UR ELPH-MCNC: 3.5 GM/DL (ref 2.5–3.8)
GLUCOSE BLD-MCNC: 169 MG/DL (ref 65–99)
HCT VFR BLD AUTO: 44.7 % (ref 37.5–51)
HGB BLD-MCNC: 14.9 G/DL (ref 13–17.7)
INR PPP: 1.04 (ref 0.9–1.1)
LYMPHOCYTES # BLD AUTO: 1.8 10*3/MM3 (ref 0.7–3.1)
LYMPHOCYTES NFR BLD AUTO: 30.2 % (ref 19.6–45.3)
MCH RBC QN AUTO: 29.8 PG (ref 26.6–33)
MCHC RBC AUTO-ENTMCNC: 33.4 G/DL (ref 31.5–35.7)
MCV RBC AUTO: 89.2 FL (ref 79–97)
MONOCYTES # BLD AUTO: 0.7 10*3/MM3 (ref 0.1–0.9)
MONOCYTES NFR BLD AUTO: 11.1 % (ref 5–12)
NEUTROPHILS # BLD AUTO: 3.2 10*3/MM3 (ref 1.7–7)
NEUTROPHILS NFR BLD AUTO: 52.8 % (ref 42.7–76)
NRBC BLD AUTO-RTO: 0.1 /100 WBC (ref 0–0.2)
PLATELET # BLD AUTO: 167 10*3/MM3 (ref 140–450)
PMV BLD AUTO: 8.8 FL (ref 6–12)
POTASSIUM BLD-SCNC: 5.2 MMOL/L (ref 3.6–5.1)
PROT SERPL-MCNC: 7.3 G/DL (ref 6.1–7.9)
PROTHROMBIN TIME: 10.6 SECONDS (ref 9.6–11.7)
RBC # BLD AUTO: 5.01 10*6/MM3 (ref 4.14–5.8)
SODIUM BLD-SCNC: 141 MMOL/L (ref 136–144)
WBC NRBC COR # BLD: 6 10*3/MM3 (ref 3.4–10.8)

## 2019-09-05 PROCEDURE — 80053 COMPREHEN METABOLIC PANEL: CPT

## 2019-09-05 PROCEDURE — 85610 PROTHROMBIN TIME: CPT

## 2019-09-05 PROCEDURE — 85025 COMPLETE CBC W/AUTO DIFF WBC: CPT

## 2019-09-05 PROCEDURE — 36415 COLL VENOUS BLD VENIPUNCTURE: CPT

## 2019-09-09 ENCOUNTER — HOSPITAL ENCOUNTER (OUTPATIENT)
Facility: HOSPITAL | Age: 74
Discharge: HOME | End: 2019-09-10
Attending: INTERNAL MEDICINE | Admitting: INTERNAL MEDICINE

## 2019-09-09 DIAGNOSIS — I25.10 CORONARY ARTERY DISEASE DUE TO LIPID RICH PLAQUE: ICD-10-CM

## 2019-09-09 DIAGNOSIS — Z98.61 CAD S/P PERCUTANEOUS CORONARY ANGIOPLASTY: Primary | ICD-10-CM

## 2019-09-09 DIAGNOSIS — I20.0 UNSTABLE ANGINA PECTORIS (HCC): ICD-10-CM

## 2019-09-09 DIAGNOSIS — I25.83 CORONARY ARTERY DISEASE DUE TO LIPID RICH PLAQUE: ICD-10-CM

## 2019-09-09 DIAGNOSIS — I25.10 CAD S/P PERCUTANEOUS CORONARY ANGIOPLASTY: Primary | ICD-10-CM

## 2019-09-09 LAB
ACT BLD: 230 SECONDS (ref 89–137)
ACT BLD: 246 SECONDS (ref 89–137)
ACT BLD: 268 SECONDS (ref 89–137)
ACT BLD: 307 SECONDS (ref 89–137)
GLUCOSE BLDC GLUCOMTR-MCNC: 102 MG/DL (ref 70–105)
GLUCOSE BLDC GLUCOMTR-MCNC: 190 MG/DL (ref 70–105)

## 2019-09-09 PROCEDURE — C1725 CATH, TRANSLUMIN NON-LASER: HCPCS | Performed by: INTERNAL MEDICINE

## 2019-09-09 PROCEDURE — 92928 PRQ TCAT PLMT NTRAC ST 1 LES: CPT | Performed by: INTERNAL MEDICINE

## 2019-09-09 PROCEDURE — A9270 NON-COVERED ITEM OR SERVICE: HCPCS | Performed by: INTERNAL MEDICINE

## 2019-09-09 PROCEDURE — C1769 GUIDE WIRE: HCPCS | Performed by: INTERNAL MEDICINE

## 2019-09-09 PROCEDURE — C1874 STENT, COATED/COV W/DEL SYS: HCPCS | Performed by: INTERNAL MEDICINE

## 2019-09-09 PROCEDURE — C1885 CATH, TRANSLUMIN ANGIO LASER: HCPCS | Performed by: INTERNAL MEDICINE

## 2019-09-09 PROCEDURE — C1894 INTRO/SHEATH, NON-LASER: HCPCS | Performed by: INTERNAL MEDICINE

## 2019-09-09 PROCEDURE — 63710000001 ATORVASTATIN 40 MG TABLET: Performed by: INTERNAL MEDICINE

## 2019-09-09 PROCEDURE — C9600 PERC DRUG-EL COR STENT SING: HCPCS | Performed by: INTERNAL MEDICINE

## 2019-09-09 PROCEDURE — G0378 HOSPITAL OBSERVATION PER HR: HCPCS

## 2019-09-09 PROCEDURE — C1887 CATHETER, GUIDING: HCPCS | Performed by: INTERNAL MEDICINE

## 2019-09-09 PROCEDURE — C9603 PERC D-E COR STENT ATHER BR: HCPCS | Performed by: INTERNAL MEDICINE

## 2019-09-09 PROCEDURE — 85347 COAGULATION TIME ACTIVATED: CPT

## 2019-09-09 PROCEDURE — 25010000002 FENTANYL CITRATE (PF) 100 MCG/2ML SOLUTION: Performed by: INTERNAL MEDICINE

## 2019-09-09 PROCEDURE — 63710000001 METOPROLOL TARTRATE 25 MG TABLET: Performed by: INTERNAL MEDICINE

## 2019-09-09 PROCEDURE — 92934 PR PRQ TRLUML CORONARY STENT/ATH/ANGIO ADDL BRANCH: CPT | Performed by: INTERNAL MEDICINE

## 2019-09-09 PROCEDURE — 25010000002 MIDAZOLAM PER 1 MG: Performed by: INTERNAL MEDICINE

## 2019-09-09 PROCEDURE — 25010000002 HEPARIN (PORCINE) PER 1000 UNITS: Performed by: INTERNAL MEDICINE

## 2019-09-09 PROCEDURE — 82962 GLUCOSE BLOOD TEST: CPT

## 2019-09-09 PROCEDURE — 63710000001 VITAMIN B-12 1000 MCG TABLET: Performed by: INTERNAL MEDICINE

## 2019-09-09 PROCEDURE — 92933 PRQ TRLML C ATHRC ST ANGIOP1: CPT | Performed by: INTERNAL MEDICINE

## 2019-09-09 PROCEDURE — C1760 CLOSURE DEV, VASC: HCPCS | Performed by: INTERNAL MEDICINE

## 2019-09-09 PROCEDURE — 63710000001 INSULIN ISOPHANE HUMAN PER 5 UNITS: Performed by: INTERNAL MEDICINE

## 2019-09-09 PROCEDURE — C9602 PERC D-E COR STENT ATHER S: HCPCS | Performed by: INTERNAL MEDICINE

## 2019-09-09 PROCEDURE — 0 IOPAMIDOL PER 1 ML: Performed by: INTERNAL MEDICINE

## 2019-09-09 DEVICE — XIENCE SIERRA™ EVEROLIMUS ELUTING CORONARY STENT SYSTEM 3.00 MM X 28 MM / RAPID-EXCHANGE
Type: IMPLANTABLE DEVICE | Status: FUNCTIONAL
Brand: XIENCE SIERRA™

## 2019-09-09 DEVICE — XIENCE SIERRA™ EVEROLIMUS ELUTING CORONARY STENT SYSTEM 2.25 MM X 38 MM / RAPID-EXCHANGE
Type: IMPLANTABLE DEVICE | Status: FUNCTIONAL
Brand: XIENCE SIERRA™

## 2019-09-09 DEVICE — STNT CORNRY RESOLUTE ONYX RX 2X18MM: Type: IMPLANTABLE DEVICE | Status: FUNCTIONAL

## 2019-09-09 RX ORDER — ASPIRIN 325 MG
TABLET ORAL AS NEEDED
Status: DISCONTINUED | OUTPATIENT
Start: 2019-09-09 | End: 2019-09-09 | Stop reason: HOSPADM

## 2019-09-09 RX ORDER — AMLODIPINE BESYLATE 5 MG/1
5 TABLET ORAL DAILY
Status: DISCONTINUED | OUTPATIENT
Start: 2019-09-10 | End: 2019-09-10 | Stop reason: HOSPADM

## 2019-09-09 RX ORDER — FENTANYL CITRATE 50 UG/ML
INJECTION, SOLUTION INTRAMUSCULAR; INTRAVENOUS AS NEEDED
Status: DISCONTINUED | OUTPATIENT
Start: 2019-09-09 | End: 2019-09-09 | Stop reason: HOSPADM

## 2019-09-09 RX ORDER — PANTOPRAZOLE SODIUM 40 MG/1
40 TABLET, DELAYED RELEASE ORAL EVERY MORNING
Status: DISCONTINUED | OUTPATIENT
Start: 2019-09-10 | End: 2019-09-10 | Stop reason: HOSPADM

## 2019-09-09 RX ORDER — MIDAZOLAM HYDROCHLORIDE 1 MG/ML
INJECTION INTRAMUSCULAR; INTRAVENOUS AS NEEDED
Status: DISCONTINUED | OUTPATIENT
Start: 2019-09-09 | End: 2019-09-09 | Stop reason: HOSPADM

## 2019-09-09 RX ORDER — ATORVASTATIN CALCIUM 40 MG/1
40 TABLET, FILM COATED ORAL DAILY
Status: DISCONTINUED | OUTPATIENT
Start: 2019-09-09 | End: 2019-09-10 | Stop reason: HOSPADM

## 2019-09-09 RX ORDER — HEPARIN SODIUM 1000 [USP'U]/ML
INJECTION, SOLUTION INTRAVENOUS; SUBCUTANEOUS AS NEEDED
Status: DISCONTINUED | OUTPATIENT
Start: 2019-09-09 | End: 2019-09-09 | Stop reason: HOSPADM

## 2019-09-09 RX ORDER — LEVOTHYROXINE SODIUM 0.12 MG/1
125 TABLET ORAL
Status: DISCONTINUED | OUTPATIENT
Start: 2019-09-10 | End: 2019-09-10 | Stop reason: HOSPADM

## 2019-09-09 RX ORDER — ASPIRIN 81 MG/1
81 TABLET ORAL DAILY
Status: DISCONTINUED | OUTPATIENT
Start: 2019-09-09 | End: 2019-09-09 | Stop reason: SDUPTHER

## 2019-09-09 RX ORDER — ASPIRIN 81 MG/1
81 TABLET ORAL DAILY
Status: DISCONTINUED | OUTPATIENT
Start: 2019-09-10 | End: 2019-09-10 | Stop reason: HOSPADM

## 2019-09-09 RX ORDER — METFORMIN HYDROCHLORIDE 500 MG/1
1000 TABLET, EXTENDED RELEASE ORAL 2 TIMES DAILY
Status: DISCONTINUED | OUTPATIENT
Start: 2019-09-12 | End: 2019-09-10 | Stop reason: HOSPADM

## 2019-09-09 RX ORDER — NITROGLYCERIN 5 MG/ML
INJECTION, SOLUTION INTRAVENOUS AS NEEDED
Status: DISCONTINUED | OUTPATIENT
Start: 2019-09-09 | End: 2019-09-09 | Stop reason: HOSPADM

## 2019-09-09 RX ORDER — LANOLIN ALCOHOL/MO/W.PET/CERES
1000 CREAM (GRAM) TOPICAL DAILY
Status: DISCONTINUED | OUTPATIENT
Start: 2019-09-09 | End: 2019-09-10 | Stop reason: HOSPADM

## 2019-09-09 RX ORDER — SODIUM CHLORIDE 9 MG/ML
100 INJECTION, SOLUTION INTRAVENOUS CONTINUOUS
Status: DISCONTINUED | OUTPATIENT
Start: 2019-09-09 | End: 2019-09-10 | Stop reason: HOSPADM

## 2019-09-09 RX ORDER — GLIPIZIDE 5 MG/1
2.5 TABLET ORAL
Status: DISCONTINUED | OUTPATIENT
Start: 2019-09-10 | End: 2019-09-10 | Stop reason: HOSPADM

## 2019-09-09 RX ORDER — LIDOCAINE HYDROCHLORIDE 20 MG/ML
INJECTION, SOLUTION INFILTRATION; PERINEURAL AS NEEDED
Status: DISCONTINUED | OUTPATIENT
Start: 2019-09-09 | End: 2019-09-09 | Stop reason: HOSPADM

## 2019-09-09 RX ORDER — SODIUM CHLORIDE 9 MG/ML
30 INJECTION, SOLUTION INTRAVENOUS CONTINUOUS
Status: DISCONTINUED | OUTPATIENT
Start: 2019-09-09 | End: 2019-09-10 | Stop reason: HOSPADM

## 2019-09-09 RX ORDER — MELATONIN
1000 DAILY
Status: DISCONTINUED | OUTPATIENT
Start: 2019-09-10 | End: 2019-09-10 | Stop reason: HOSPADM

## 2019-09-09 RX ADMIN — METOPROLOL TARTRATE 25 MG: 25 TABLET, FILM COATED ORAL at 21:41

## 2019-09-09 RX ADMIN — ATORVASTATIN CALCIUM 40 MG: 40 TABLET, FILM COATED ORAL at 21:40

## 2019-09-09 RX ADMIN — SODIUM CHLORIDE 100 ML/HR: 0.9 INJECTION, SOLUTION INTRAVENOUS at 17:59

## 2019-09-09 RX ADMIN — Medication 1000 MCG: at 21:40

## 2019-09-09 RX ADMIN — SODIUM CHLORIDE 30 ML/HR: 900 INJECTION, SOLUTION INTRAVENOUS at 11:43

## 2019-09-09 RX ADMIN — INSULIN HUMAN 20 UNITS: 100 INJECTION, SUSPENSION SUBCUTANEOUS at 21:41

## 2019-09-09 NOTE — PLAN OF CARE
3CD Physical Therapy Note    Patient was not available for the therapy session at this time.  Reason not seen: Other (comment)(OR, ex lap with wash out under general anesthesia) (06/12/19 2003). Collaborated with RN, Ghazal, regarding therapy needing new orders following surgery under general anesthesia. DC PT 6/12/19.    Re-Attempt Plan: Other (comment)(with new PT/OT orders following surgery) (06/12/19 3742).     Problem: Patient Care Overview  Goal: Plan of Care Review  Outcome: Ongoing (interventions implemented as appropriate)   09/09/19 2739   Coping/Psychosocial   Plan of Care Reviewed With patient   Plan of Care Review   Progress improving

## 2019-09-09 NOTE — PLAN OF CARE
Problem: Patient Care Overview  Goal: Plan of Care Review  Outcome: Ongoing (interventions implemented as appropriate)   09/09/19 1910   Coping/Psychosocial   Plan of Care Reviewed With patient   Plan of Care Review   Progress improving

## 2019-09-10 ENCOUNTER — TELEPHONE (OUTPATIENT)
Dept: CARDIOLOGY | Facility: CLINIC | Age: 74
End: 2019-09-10

## 2019-09-10 VITALS
RESPIRATION RATE: 16 BRPM | HEART RATE: 63 BPM | WEIGHT: 246.91 LBS | TEMPERATURE: 97 F | SYSTOLIC BLOOD PRESSURE: 171 MMHG | OXYGEN SATURATION: 96 % | HEIGHT: 69 IN | BODY MASS INDEX: 36.57 KG/M2 | DIASTOLIC BLOOD PRESSURE: 71 MMHG

## 2019-09-10 PROBLEM — I25.10 CAD S/P PERCUTANEOUS CORONARY ANGIOPLASTY: Status: ACTIVE | Noted: 2019-09-10

## 2019-09-10 PROBLEM — I20.0 UNSTABLE ANGINA PECTORIS: Status: RESOLVED | Noted: 2019-08-29 | Resolved: 2019-09-10

## 2019-09-10 PROBLEM — Z98.61 CAD S/P PERCUTANEOUS CORONARY ANGIOPLASTY: Status: ACTIVE | Noted: 2019-09-10

## 2019-09-10 LAB
ANION GAP SERPL CALCULATED.3IONS-SCNC: 12.4 MMOL/L (ref 5–15)
BUN BLD-MCNC: 13 MG/DL (ref 8–20)
BUN/CREAT SERPL: 10 (ref 6.2–20.3)
CALCIUM SPEC-SCNC: 8.5 MG/DL (ref 8.9–10.3)
CHLORIDE SERPL-SCNC: 101 MMOL/L (ref 101–111)
CO2 SERPL-SCNC: 26 MMOL/L (ref 22–32)
CREAT BLD-MCNC: 1.3 MG/DL (ref 0.7–1.2)
GFR SERPL CREATININE-BSD FRML MDRD: 54 ML/MIN/1.73
GLUCOSE BLD-MCNC: 199 MG/DL (ref 65–99)
GLUCOSE BLDC GLUCOMTR-MCNC: 190 MG/DL (ref 70–105)
MAGNESIUM SERPL-MCNC: 1.6 MG/DL (ref 1.8–2.5)
POTASSIUM BLD-SCNC: 4.4 MMOL/L (ref 3.6–5.1)
SODIUM BLD-SCNC: 135 MMOL/L (ref 136–144)

## 2019-09-10 PROCEDURE — A9270 NON-COVERED ITEM OR SERVICE: HCPCS | Performed by: INTERNAL MEDICINE

## 2019-09-10 PROCEDURE — 99217 PR OBSERVATION CARE DISCHARGE MANAGEMENT: CPT | Performed by: NURSE PRACTITIONER

## 2019-09-10 PROCEDURE — 83735 ASSAY OF MAGNESIUM: CPT | Performed by: NURSE PRACTITIONER

## 2019-09-10 PROCEDURE — 63710000001 METOPROLOL TARTRATE 25 MG TABLET: Performed by: INTERNAL MEDICINE

## 2019-09-10 PROCEDURE — G0378 HOSPITAL OBSERVATION PER HR: HCPCS

## 2019-09-10 PROCEDURE — 80048 BASIC METABOLIC PNL TOTAL CA: CPT | Performed by: NURSE PRACTITIONER

## 2019-09-10 PROCEDURE — 63710000001 VITAMIN D 1000 UNITS TABLET: Performed by: INTERNAL MEDICINE

## 2019-09-10 PROCEDURE — 63710000001 AMLODIPINE 5 MG TABLET: Performed by: INTERNAL MEDICINE

## 2019-09-10 PROCEDURE — 63710000001 ASPIRIN 81 MG TABLET DELAYED-RELEASE: Performed by: INTERNAL MEDICINE

## 2019-09-10 PROCEDURE — 63710000001 ISOSORBIDE MONONITRATE 30 MG TABLET SUSTAINED-RELEASE 24 HOUR: Performed by: INTERNAL MEDICINE

## 2019-09-10 PROCEDURE — 63710000001 LEVOTHYROXINE 125 MCG TABLET: Performed by: INTERNAL MEDICINE

## 2019-09-10 PROCEDURE — 82962 GLUCOSE BLOOD TEST: CPT

## 2019-09-10 PROCEDURE — 63710000001 INSULIN ISOPHANE HUMAN PER 5 UNITS: Performed by: INTERNAL MEDICINE

## 2019-09-10 PROCEDURE — 63710000001 TICAGRELOR 90 MG TABLET: Performed by: INTERNAL MEDICINE

## 2019-09-10 PROCEDURE — 63710000001 PANTOPRAZOLE 40 MG TABLET DELAYED-RELEASE: Performed by: INTERNAL MEDICINE

## 2019-09-10 PROCEDURE — 63710000001 VITAMIN B-12 1000 MCG TABLET: Performed by: INTERNAL MEDICINE

## 2019-09-10 PROCEDURE — 63710000001 GLIPIZIDE 5 MG TABLET: Performed by: INTERNAL MEDICINE

## 2019-09-10 RX ADMIN — ISOSORBIDE MONONITRATE 90 MG: 30 TABLET, EXTENDED RELEASE ORAL at 07:22

## 2019-09-10 RX ADMIN — TICAGRELOR 90 MG: 90 TABLET ORAL at 07:24

## 2019-09-10 RX ADMIN — AMLODIPINE BESYLATE 5 MG: 5 TABLET ORAL at 07:21

## 2019-09-10 RX ADMIN — GLIPIZIDE 2.5 MG: 5 TABLET ORAL at 07:22

## 2019-09-10 RX ADMIN — Medication 1000 MCG: at 07:25

## 2019-09-10 RX ADMIN — ASPIRIN 81 MG: 81 TABLET, DELAYED RELEASE ORAL at 07:24

## 2019-09-10 RX ADMIN — METOPROLOL TARTRATE 25 MG: 25 TABLET, FILM COATED ORAL at 07:24

## 2019-09-10 RX ADMIN — VITAMIN D 1000 UNITS: 25 TAB ORAL at 07:24

## 2019-09-10 RX ADMIN — LEVOTHYROXINE SODIUM 125 MCG: 125 TABLET ORAL at 07:23

## 2019-09-10 RX ADMIN — INSULIN HUMAN 20 UNITS: 100 INJECTION, SUSPENSION SUBCUTANEOUS at 07:20

## 2019-09-10 RX ADMIN — PANTOPRAZOLE SODIUM 40 MG: 40 TABLET, DELAYED RELEASE ORAL at 07:21

## 2019-09-10 NOTE — DISCHARGE SUMMARY
[facility] [place] [facility]    DISCHARGE SUMMARY      Patient Name: Carroll Rodgers  :1945  74 y.o.    Date of Admit: 2019  Date of Discharge:  9/10/2019    Discharge Diagnosis:  Problem List Items Addressed This Visit        Cardiovascular and Mediastinum    Coronary artery disease due to lipid rich plaque    Relevant Medications    metoprolol tartrate (LOPRESSOR) 25 MG tablet    ticagrelor (BRILINTA) 90 MG tablet tablet    Other Relevant Orders    Cardiac Catheterization/Vascular Study (Completed)    CAD S/P percutaneous coronary angioplasty - Primary    Relevant Medications    metoprolol tartrate (LOPRESSOR) 25 MG tablet    ticagrelor (BRILINTA) 90 MG tablet tablet    Other Relevant Orders    Referral to Cardiac Rehab    Basic Metabolic Panel    RESOLVED: Unstable angina pectoris (CMS/HCC)    Relevant Medications    metoprolol tartrate (LOPRESSOR) 25 MG tablet    ticagrelor (BRILINTA) 90 MG tablet tablet    Other Relevant Orders    Cardiac Catheterization/Vascular Study (Completed)        1) CAD with remote CABG/PCI s/p laser atherectomy with PCI and LARISA to first and second obtuse marginal branch and proximal circumflex and complete left main.  - continue DAPT with aspirin and brilinta, with reduce BB given bradycardia, continue high dose statin  - 2D echo 2019 showed EF = 54% with grade 1 diastolic dysfunction and no significant VHD.    2) HTN    3) HLD    4) DM    5) Renal insufficiency  - CKD?, unknown baseline Cr  - Cr 1.20 --> 1.30  - hold ACEi/HCTZ today, do not resume glucophage for 48 post cath  - Check BMP in one week.    6) hx CVA    7) Hx hypothyroidism    Hospital Course:   Carroll is a very pleasant 74-year-old patient self-referred.  He is previously seen cardiologist in [facility] in the past most recently was Dr. Lutz.   he has known coronary artery disease and is status post coronary artery bypass grafting in  with a LIMA to the LAD and SVG to the RCA and circumflex artery.   He subsequently had total occlusion of his SVG graft to the obtuse marginal branch and underwent stenting with a drug-eluting stent 7/1/2015 as well as his SVG graft to the right coronary artery.  Subsequently he developed exertional and rest angina with his worst symptom being exertional dyspnea.  Cardiac catheterization performed 1/30/2018 showed critical stenosis of the jailed segment of the inferior branch of the circumflex obtuse marginal branch with what appeared to be 70% in-stent restenosis of the superior parent limb of the circumflex into the superior limb.  He was told that medical therapy was his best option.  Since then he has had worsening angina on Ranexa and worsening dyspnea.  He comes to me for second opinion     I personally reviewed the cardiac catheterization images from 1/30/2018 and confirmed the above results.  In addition he has a patent LIMA to the LAD with a distal apical 80% narrowing that is inconsequential.  He has a chronic total occlusion of his SVG graft to the his right coronary artery and again an occlusion of his SVG to the OM.  The circumflex OM native vessel is as described above.  His ECG today in the office showed normal sinus rhythm with APCs and a old left bundle branch block.  He has had no recent echo.     His chest pain syndrome is that of exertional angina often if not always associated with dyspnea with chronic stable angina class III symptoms versus unstable angina given the increase in frequency.  He does not have associated nausea or diaphoresis.     The following portions of the patient's history were reviewed and updated as appropriate: allergies, current medications, past family history, past medical history, past social history, past surgical history and problem list.    Patient underwent left heart catheterization with successful laser atherectomy and PCI with LARISA to first and second obtuse marginal branch and proximal circumflex and complete left main.   Patient was observed overnight in the [facility]PC[facility] unit.  Patient tells me he felt SOA overnight.  Patient appears compensated with respect to volume.  Telemetry showed SB down to 39 bpm though patient also describes side effects after taking brilinta.  He has ambulated in the hallway without difficulty today and feels well this am.  Patient has no post cath groin hematoma and renal function is stable but will check BMP in one week.  As d/w Dr. Ochoa, patient is planned for discharge home today.      Procedures Performed  Procedure(s):  Stent LARISA coronary  Atherectomy-coronary    Indications     Coronary artery disease due to lipid rich plaque [I25.10, I25.83 (ICD-10-CM)]   Unstable angina pectoris (CMS/HCC) [I20.0 (ICD-10-CM)]       Conclusion     Indication for procedure:     1.  Unstable angina  2.  Known critical in-stent restenosis of the circumflex artery with failed bypass graft and failed stenting in the past.  3.  Multiple coronary artery risk factors     Procedures performed:     1.  Laser atherectomy  2.  Percutaneous coronary intervention involving drug-eluting stent placement to the first and second obtuse marginal branch of left circumflex artery (superior limb, 2.25 x 38 mm Xience drug-eluting stent overlapped with a 3.0 x 28 mm Xience drug-eluting stent; inferior limb, 2.0 x 18mm Florence drug-eluting stent).  3.  Percutaneous coronary intervention involving drug-eluting stent placement to the left main into the distal left main and ostial proximal circumflex (3.0 x 28 mm Xience drug-eluting stent).     History:  This is a 74-year-old male patient who presented with known coronary artery disease with bypass grafts the most salient of which was the SVG graft to the obtuse marginal branch which has since occluded.  The patient underwent stenting to the circumflex artery and now returns with severe in-stent restenosis with complex bifurcation disease in the first and second obtuse marginal branch of left  circumflex flex artery.  He has been having unstable angina that is been worsening over the past 6 weeks.  Therefore the patient is being brought to cardiac catheterization lab for laser atherectomy and PCI and stenting of the first and second obtuse marginal branch left circumflex flex artery.     Description of procedure:     Patient had the risks and benefits of the procedure explained to them in detail after which informed consent was obtained.  Patient was then brought to the cardiac catheterization lab and placed on the table in supine position.  Next the right groin was prepped and draped in sterile fashion.  Next using modified Seldinger technique and a 21-gauge micro puncture needle, the right femoral artery was cannulated without difficulty and a 7 Japanese sheath was inserted and flushed with heparinized saline.  Next using 7 Japanese EBU 4 guide catheter advanced over an 0.035 guidewire to the level the aortic root and then used to selectively engage the respective left main coronary ostia, multiple cineangiographic images were obtained all the appropriate projections using hand-injection of nonionic contrast sterile.  The culprit was identified as per above a 70% bifurcating lesion into the first and second obtuse marginal branch of left circumflex artery due to re-stenosis of the previously placed stent with 70% disease in the mid superior limb and 80% disease in the mid inferior limb with intervening 60% disease.  There is also a proximal circumflex into the distal left main 60% calcified stenosis.  Therefore a run-through wire was used to wire the inferior limb and a whisper export into the superior limb predilatation into the inferior limb was performed using a 2.0 mm NC trek balloon followed by a 2.0 m angioscope cutting balloon followed by the same predilatation in the superior limb with the same balloons.  Next laser atherectomy was performed at a setting of 40 and 40.  3 separate passes were made.   Next bifurcation kissing balloon predilatation was then performed at the bifurcating superior and inferior limb of the first and second obtuse marginal branches.  We were able to reconstruct the walter.  Stenting was then performed into the inferior limb using a 2.0 x 18 mm pato drug-eluting stent deployed at nominal pressure and postdilated to greater than 2.2 mm with a 2.2 mm NC trek balloon at 14 delia.  Stenting into the superior limb was then performed which extended into the proximal circumflex was performed using a 2.25 x 38 mm Xience drug-eluting stent deployed at nominal pressure.  Postdilatation was then performed along the distal stented segment with a 2.5 mm NC trek balloon and then again after rewiring into the jailed inferior limb 1.5 mm balloon was used to open the strut followed by kissing balloon postdilatation into the inferior and superior limbs with a 2.5 mm NC trek balloon in the superior limb and circumflex and a 2.25 mm NC trek balloon into the inferior limb and circumflex both at 12 to 14 delia.  A we then overlapped 3.0 x 28 mm Xience drug-eluting stent into the proximal circumflex and ostial proximal left main coronary artery.  Postdilatation was then performed using a 3.0 x 20 mm NC trek balloon at 20 delia along the stented segment of the 28 mm stent.  Final cine angiographic imaging revealed an excellent angiographic result with preserved MONEI-3 flow into the superior and inferior limbs of the circumflex (MONIE-3 prior and MONIE-3 post).  There is 0% residual stenosis in the stented segments.  There is no remaining stenosis of the jailed inferior ostial limb of the second obtuse marginal branch.     Patient tolerated the procedure well there were no complications.     During the case, conscious sedation monitoring was more than 30 min.     At the end of the case a 6 Danish pro-glide device was deployed in the right groin for right groin hemostasis     Findings     Severe and critical stenosis of  the circumflex first and second obtuse marginal branch and distal left main into the circumflex.     Conclusions:     Successful laser atherectomy, PCI and stenting of the severe critical lesions as described above: First and second obtuse marginal branch and proximal circumflex and complete left main     Recommendations:     To need dual antiplatelet therapy indefinitely.  Optimize medical therapy for secondary prevention of ischemic heart disease.                Consults     No orders found for last 30 day(s).          Pertinent Test Results:   Results from last 7 days   Lab Units 09/10/19  0501 09/05/19  1041   SODIUM mmol/L 135* 141   POTASSIUM mmol/L 4.4 5.2*   CHLORIDE mmol/L 101 104   CO2 mmol/L 26.0 24.0   BUN mg/dL 13 13   CREATININE mg/dL 1.30* 1.20   CALCIUM mg/dL 8.5* 9.2   BILIRUBIN mg/dL  --  0.7   ALK PHOS U/L  --  123*   ALT (SGPT) U/L  --  22   AST (SGOT) U/L  --  20   GLUCOSE mg/dL 199* 169*         @LABRCNT(bnp)@  Results from last 7 days   Lab Units 09/05/19  1041   WBC 10*3/mm3 6.00   HEMOGLOBIN g/dL 14.9   HEMATOCRIT % 44.7   PLATELETS 10*3/mm3 167     Results from last 7 days   Lab Units 09/05/19  1041   INR  1.04     Results from last 7 days   Lab Units 09/10/19  0501   MAGNESIUM mg/dL 1.6*           ECHOCARDIOGRAM:    Results for orders placed during the hospital encounter of 08/23/19   Adult Transthoracic Echo Complete W/ Cont if Necessary Per Protocol    Narrative · Left ventricular systolic function is normal.  · Left ventricular diastolic dysfunction (grade I a) consistent with   impaired relaxation.     Normal LV/RV function, + grade 1 diastolic dysfunction, no significant   regional abn seen, no significant valvulopathy seen, normal PASP and RA   pressure estimated.          Physical Exam  Neuro: AAOx3  CV: Distant S1S2 RRR, no murmur  Resp: non-labored, CTA  GI: BS+ hyperactive, abd soft  Ext: pedal palp, no edema, right groin without hematoma  Tele: SR/SB down to 39 bpm overnight  with rare PVCs    Condition on Discharge: stable    Discharge Medications     Discharge Medications      New Medications      Instructions Start Date   ticagrelor 90 MG tablet tablet  Commonly known as:  BRILINTA   90 mg, Oral, Every 12 Hours Scheduled         Changes to Medications      Instructions Start Date   metoprolol tartrate 25 MG tablet  Commonly known as:  LOPRESSOR  What changed:    · medication strength  · how much to take  · additional instructions   12.5 mg, Oral, 2 Times Daily         Continue These Medications      Instructions Start Date   amLODIPine 10 MG tablet  Commonly known as:  NORVASC   5 mg, Oral, Daily      aspirin 81 MG EC tablet   81 mg, Oral, Daily      atorvastatin 40 MG tablet  Commonly known as:  LIPITOR   40 mg, Oral, Daily      cholecalciferol 1000 units tablet  Commonly known as:  VITAMIN D3   1,000 Units, Oral, Daily      glimepiride 2 MG tablet  Commonly known as:  AMARYL   2 mg, Oral, Every Morning Before Breakfast      insulin  UNIT/ML injection  Commonly known as:  humuLIN N,novoLIN N   20 Units, Subcutaneous, 2 Times Daily      isosorbide mononitrate 30 MG 24 hr tablet  Commonly known as:  IMDUR   90 mg, Oral, Daily      lansoprazole 30 MG capsule  Commonly known as:  PREVACID   30 mg, Oral, Daily      levothyroxine 125 MCG tablet  Commonly known as:  SYNTHROID, LEVOTHROID   125 mcg, Oral, Daily      lisinopril-hydrochlorothiazide 20-12.5 MG per tablet  Commonly known as:  PRINZIDE,ZESTORETIC   1 tablet, Oral, Daily      metFORMIN  MG 24 hr tablet  Commonly known as:  GLUCOPHAGE-XR   1,000 mg, Oral, 2 Times Daily      NOVOLOG SC   5 Units, Subcutaneous, 3 Times Daily With Meals      vitamin B-12 1000 MCG tablet  Commonly known as:  CYANOCOBALAMIN   1,000 mcg, Oral, Daily         Stop These Medications    clopidogrel 75 MG tablet  Commonly known as:  PLAVIX            Discharge Diet:   Diet Instructions     Diet: Cardiac, Consistent Carbohydrate      Discharge  Diet:   Cardiac  Consistent Carbohydrate             Activity at Discharge:   Activity Instructions     Bathing Restrictions      For one week    Type of Restriction:  Bathing    Bathing Restrictions:  No Tub Bath    Driving Restrictions      Type of Restriction:  Driving    Driving Restrictions:  No Driving (Time Limited)    Length:  Other    Indicate Length of Restriction:  3 days post heart catheterization    Lifting Restrictions      Type of Restriction:  Lifting    Lifting Restrictions:  Lifting Restriction (Indicate Limit)    Weight Limit (Pounds):  4    Length of Lifting Restriction:  3 days post heart catherization          Discharge disposition: home    Follow-up Appointments  Future Appointments   Date Time Provider Department Center   9/18/2019 10:30 AM KAYLA Lutz MD [facility] None   9/27/2019  9:00 AM Gonzalez Ochoa MD [facility]     Additional Instructions for the Follow-ups that You Need to Schedule     Referral to Cardiac Rehab   As directed      Basic Metabolic Panel    Sep 16, 2019 (Approximate)      Please fax results [phone] Attn: Dr. Ochoa  Dx: post-cath    Order Comments:  Please fax results [phone] Attn: Dr. Ochoa Dx: post-cath                Test Results Pending at Discharge       BAYRON Clay, [facility]    09/10/19  10:20 AM    Time: > 30 minutes spent on discharge

## 2019-09-10 NOTE — DISCHARGE INSTRUCTIONS
Post Cath Instructions        1) Drink plenty of fluids for the next 24 hours.  This helps to eliminate the dye used in your procedure through urination.  You may resume a normal diet; however, try to avoid foods that would cause gas or constipation.    2) Sedative medication given to you during your catheterization may decrease your judgement and reaction time for up to 24-48 hours.  Therefore:  a. DO NOT drive or operate hazardous machinery (48 hours)  b. DO NOT consume alcoholic beverages  c. DO NOT make any important/legal decisions  d. Have someone stay with you for at least 24 hours    3) To allow proper healing and prevent bleeding, the following activities are to be strictly avoided for the next 24-48 hours:  a. Excessive bending at wound site  b. Straining (anything that would tense up muscles around the affected puncture site)  c. Lifting objects greater than 5 pounds, pushing, or pulling for 5 days    i. For Groin Cases:  1. Refrain from sexual activity  2. Refrain from running or vigorous walking  3. No prolonged sitting or standing  4. Limit stair climbing as much as possible    4) Keep the puncture site clean and dry.  You may remove the dressing tomorrow and replace it with a band-aid for at least one additional day.  Gently clean the site with mild soap and water.  No scrubbing/rubbing and lightly pat the area dry.  Showers are acceptable; however, avoid submerging in water (tub baths, hot tubs, swimming pools, dishwater, etc…) for at least one week.  The site should be completely healed before resuming these activities to reduce the risk of infection.  Check the site often.  Watch for signs and symptoms of infection and notify your physician if any of the following occur:  a. Bleeding or an increase in swelling at the puncture site  b. Fever  c. Increased soreness around puncture site  d. Foul odor or significant drainage from the puncture site  e. Swelling, redness, or warmth at the puncture  site    **A bruise or small “pea sized” lump under the skin at the puncture site is not unusual.  This should disappear within 3-4 weeks.**  5) CONTACT YOUR PHYSICIAN OR CALL 911 IF YOU EXPERIENCE ANY OF THE FOLLOWING:  a. Increased angina (chest pain) or frequent sensations of pressure, burning, pain, or other discomfort in the chest, arm, jaws, or stomach  b. Lightheadedness, dizziness, faint feeling, sweating, or difficulty breathing  c. Odd sensation changes like numbness, tingling, coldness, or pain in the arm or leg in which the catheter was inserted  d. Limb in which the catheter was inserted becomes pale/bluish in color    IMPORTANT:  Although this occurs very rarely, if you should develop bright red or excessive bleeding, feel a “pop” inside at the insertion site, or notice a sudden increase in swelling larger than a walnut, you should call 911.  Hold continuous firm pressure to the access site until emergency personnel arrive.  It is best if someone else can do this for you.

## 2019-09-10 NOTE — PROGRESS NOTES
Discharge Planning Assessment  [facility]     Patient Name: Carroll Rodgers  MRN: [id number]  Today's Date: 9/10/2019    Admit Date: 9/9/2019    Discharge Needs Assessment     Row Name 09/10/19 0742       Living Environment    Lives With  spouse    Current Living Arrangements  home/apartment/condo    Primary Care Provided by  [facility]    Quality of Family Relationships  helpful;involved    Able to Return to Prior Arrangements  yes       Resource/Environmental Concerns    Resource/Environmental Concerns  none    Transportation Concerns  car, none       Transition Planning    Patient/Family Anticipates Transition to  home with family    Patient/Family Anticipated Services at Transition  none    Transportation Anticipated  family or friend will provide       Discharge Needs Assessment    Concerns to be Addressed  no discharge needs identified    Equipment Currently Used at Home  glucometer    Anticipated Changes Related to Illness  none    Equipment Needed After Discharge  none        Discharge Plan     Row Name 09/10/19 0744       Plan    Plan  Routine to home.  Pt denies any further needs. Pt states no issues affording prescriptions.   PCP Ruiz    Patient/Family in Agreement with Plan  yes    Final Discharge Disposition Code  01 - home or self-care            Aminah Roque RN

## 2019-09-10 NOTE — TELEPHONE ENCOUNTER
Patient is wanting to know if he can get samples of the Brilinta until [facility].    [phone]

## 2019-09-17 ENCOUNTER — TELEPHONE (OUTPATIENT)
Dept: CARDIAC REHAB | Facility: HOSPITAL | Age: 74
End: 2019-09-17

## 2019-09-17 DIAGNOSIS — Z95.5 STATUS POST CORONARY ARTERY STENT PLACEMENT: Primary | ICD-10-CM

## 2019-09-17 DIAGNOSIS — Z98.890 HISTORY OF ATHERECTOMY: ICD-10-CM

## 2019-09-17 NOTE — TELEPHONE ENCOUNTER
09/17/2019- AYUSH shin called pt to discuss CR. Pt states is interested and will discuss with his cardiologist DR Ochoa on 09/27/2019 OV.Pt sts has previously   done CR at [facility].   Will call pt again after 09/27/2019 OV.  [facility]

## 2019-10-11 ENCOUNTER — OFFICE VISIT (OUTPATIENT)
Dept: CARDIOLOGY | Facility: CLINIC | Age: 74
End: 2019-10-11

## 2019-10-11 VITALS
HEART RATE: 72 BPM | WEIGHT: 248 LBS | RESPIRATION RATE: 18 BRPM | BODY MASS INDEX: 36.73 KG/M2 | OXYGEN SATURATION: 96 % | DIASTOLIC BLOOD PRESSURE: 80 MMHG | HEIGHT: 69 IN | SYSTOLIC BLOOD PRESSURE: 170 MMHG

## 2019-10-11 DIAGNOSIS — I25.10 CORONARY ARTERY DISEASE DUE TO LIPID RICH PLAQUE: Primary | ICD-10-CM

## 2019-10-11 DIAGNOSIS — I10 ESSENTIAL HYPERTENSION: ICD-10-CM

## 2019-10-11 DIAGNOSIS — Z79.4 TYPE 2 DIABETES MELLITUS WITHOUT COMPLICATION, WITH LONG-TERM CURRENT USE OF INSULIN (HCC): ICD-10-CM

## 2019-10-11 DIAGNOSIS — E11.9 TYPE 2 DIABETES MELLITUS WITHOUT COMPLICATION, WITH LONG-TERM CURRENT USE OF INSULIN (HCC): ICD-10-CM

## 2019-10-11 DIAGNOSIS — E78.2 MIXED HYPERLIPIDEMIA: ICD-10-CM

## 2019-10-11 DIAGNOSIS — I25.83 CORONARY ARTERY DISEASE DUE TO LIPID RICH PLAQUE: Primary | ICD-10-CM

## 2019-10-11 PROCEDURE — 99213 OFFICE O/P EST LOW 20 MIN: CPT | Performed by: INTERNAL MEDICINE

## 2019-10-11 RX ORDER — CLOPIDOGREL BISULFATE 75 MG/1
75 TABLET ORAL DAILY
COMMUNITY

## 2019-10-14 ENCOUNTER — TELEPHONE (OUTPATIENT)
Dept: CARDIAC REHAB | Facility: HOSPITAL | Age: 74
End: 2019-10-14

## 2019-10-14 NOTE — PROGRESS NOTES
Subjective:     Encounter Date:10/11/2019      Patient ID: Carroll Rodgers is a 74 y.o. male.    Chief Complaint:  No chief complaint on file.      HPI:    Carroll is a very pleasant 74-year-old patient self-referred.  He is previously seen cardiologist in [facility] in the past most recently was Dr. Lutz.   he has known coronary artery disease and is status post coronary artery bypass grafting in 1996 with a LIMA to the LAD and SVG to the RCA and circumflex artery.  He subsequently had total occlusion of his SVG graft to the obtuse marginal branch and underwent stenting with a drug-eluting stent 7/1/2015 as well as his SVG graft to the right coronary artery.  Subsequently he developed exertional and rest angina with his worst symptom being exertional dyspnea.  Cardiac catheterization performed 1/30/2018 showed critical stenosis of the jailed segment of the inferior branch of the circumflex obtuse marginal branch with what appeared to be 70% in-stent restenosis of the superior parent limb of the circumflex into the superior limb.  He was told that medical therapy was his best option.  Since then he has had worsening angina on Ranexa and worsening dyspnea.  He comes to me for second opinion    I personally reviewed the cardiac catheterization images from 1/30/2018 and confirmed the above results.  In addition he has a patent LIMA to the LAD with a distal apical 80% narrowing that is inconsequential.  He has a chronic total occlusion of his SVG graft to the his right coronary artery and again an occlusion of his SVG to the OM.  The circumflex OM native vessel is as described above.  His ECG today in the office showed normal sinus rhythm with APCs and a old left bundle branch block.  He has had no recent echo.    His chest pain syndrome was that of exertional angina often if not always associated with dyspnea with chronic stable angina class III symptoms versus unstable angina given the increase in frequency.  He  did not have associated nausea or diaphoresis.    He underwent successful PCI and stenting 9/10/2019 to the obtuse marginal branch #1 in 2 of the circumflex artery with Xience overlapping stents.  Today he returns with no complaints from a cardiovascular standpoint, symptom-free.    The following portions of the patient's history were reviewed and updated as appropriate: allergies, current medications, past family history, past medical history, past social history, past surgical history and problem list.    Problem List:  Patient Active Problem List   Diagnosis   • Type 2 diabetes mellitus without complication, with long-term current use of insulin (CMS/[facility])   • Mixed hyperlipidemia   • Essential hypertension   • Acquired hypothyroidism   • Coronary artery disease due to lipid rich plaque   • Unstable angina (CMS/[facility])   • CAD S/P percutaneous coronary angioplasty       Past Medical History:  Past Medical History:   Diagnosis Date   • Coronary artery disease    • Diabetes mellitus (CMS/[facility])    • Hyperlipidemia    • Hypertension    • Hypothyroidism    • Stroke (CMS/[facility])     X2   • Unstable angina (CMS/[facility]) 8/22/2019       Past Surgical History:  Past Surgical History:   Procedure Laterality Date   • CARDIAC CATHETERIZATION  01/30/2018    EF 40%, Distal LM 50% stenosis, Proximal LM 50% stenosis, Proximal LAD 95% stenosis, Mid % stenosis, Apical LAD 70% stenosis, Significant ISR at bifurcation, 90% stenosis at circumflex portoin, 90% stenosis at ostial OM, distal circumflex 80% stenosis, Mid % stenosis, SVG to % occluded at ostium, SVG to OM branches 100% occluded, LIMA to LAD has no significant disease   • CARDIAC CATHETERIZATION N/A 9/9/2019    PCI to OM1 and OM2 of LCX using overlapping Xience LARISA' and [place] LARISA. PCI to LM into distal LM and ostial proximal circ using Xience LARISA. Laser atherectomy.   • CARDIAC CATHETERIZATION N/A 9/9/2019    Procedure: Atherectomy-coronary;  Surgeon: Don  "Gonzalez Ventura MD;  Location: [facility] CATH INVASIVE LOCATION;  Service: Cardiology   • CORONARY ARTERY BYPASS GRAFT  1996    X3   • CORONARY STENT PLACEMENT  07/01/2015    Xience [place]pine LARISA to RCA (X2) and Mid Circ (X1)-Dr. Rodriguez   • CORONARY STENT PLACEMENT  2006   • CORONARY STENT PLACEMENT  2010       Social History:  Social History     Socioeconomic History   • Marital status:      Spouse name: Not on file   • Number of children: Not on file   • Years of education: Not on file   • Highest education level: Not on file   Tobacco Use   • Smoking status: Former Smoker   • Smokeless tobacco: Never Used   • Tobacco comment: 40 years ago   Substance and Sexual Activity   • Alcohol use: Yes   • Drug use: No       Allergies:  Allergies   Allergen Reactions   • Peanut-Containing Drug Products          Review of Symptoms:  Constitutional: Patient afebrile no chills or unexpected weight changes  Respiratory: No cough, no wheezing or dyspnea  Cardiovascular: No chest pain, palpitations, dyspnea, orthopnea and no edema  Gastrointestinal: No nausea, vomiting, constipation or diarrhea.  No melena or dark stools    All other systems reviewed and are negative             Objective:         /80 (BP Location: Left arm, Patient Position: Sitting, Cuff Size: Large Adult)   Pulse 72   Resp 18   Ht 175.3 cm (69\")   Wt 112 kg (248 lb)   SpO2 96%   BMI 36.62 kg/m²     Physical exam  Constitutional: well-nourished, and appears stated age in no acute distress  PERRL: Conjunctiva clear, no pallor, anicteric  HENMT: normocephalic, normal dentition, no cyanosis or pallor  Neck:no bruits, or thrills and bilateral normal carotid upstroke. Normal jugular venous pressure  Cardiovascular: No parasternal heaves an non-displaced focal PMI. Normal rate and rhythm: no rub, gallop, murmur or click and normal S1 and S2; no lower or upper extremity edema.   Lungs: unlabored, no wheezing with no rales or rhonchi on " auscultation.  Extremities: Warm, no clubbing, cyanosis. Full and equal peripheral pulses in extremities with no bruits appreciated.   Abdomen: soft, non-tender, non-distended  Musculoskeletal: no joint tenderness or swelling and no erythema  Skin: Warm and dry, non-erythematous   Neuro:alert and normal affect. Oriented to time, place and person.       In-Office Procedure(s):  Procedures    ASCVD RIsk Score::  The 10-year ASCVD risk score ([place] RODRIGUEZ Jr., et al., 2013) is: 63.9%    Values used to calculate the score:      Age: 74 years      Sex: Male      Is Non- : No      Diabetic: Yes      Tobacco smoker: No      Systolic Blood Pressure: 170 mmHg      Is BP treated: Yes      HDL Cholesterol: 45 mg/dL      Total Cholesterol: 155 mg/dL    Recent Radiology:  Imaging Results (most recent)     None          Lab Review:   Admission on 09/09/2019, Discharged on 09/10/2019   Component Date Value   • Glucose 09/09/2019 190*   • Activated Clotting Time  09/09/2019 307*   • Activated Clotting Time  09/09/2019 268*   • Activated Clotting Time  09/09/2019 246*   • Activated Clotting Time  09/09/2019 230*   • Glucose 09/09/2019 102    • Glucose 09/10/2019 199*   • BUN 09/10/2019 13    • Creatinine 09/10/2019 1.30*   • Sodium 09/10/2019 135*   • Potassium 09/10/2019 4.4    • Chloride 09/10/2019 101    • CO2 09/10/2019 26.0    • Calcium 09/10/2019 8.5*   • eGFR Non African Amer 09/10/2019 54*   • BUN/Creatinine Ratio 09/10/2019 10.0    • Anion Gap 09/10/2019 12.4    • Magnesium 09/10/2019 1.6*   • Glucose 09/10/2019 190*   Lab on 09/05/2019   Component Date Value   • Glucose 09/05/2019 169*   • BUN 09/05/2019 13    • Creatinine 09/05/2019 1.20    • Sodium 09/05/2019 141    • Potassium 09/05/2019 5.2*   • Chloride 09/05/2019 104    • CO2 09/05/2019 24.0    • Calcium 09/05/2019 9.2    • Total Protein 09/05/2019 7.3    • Albumin 09/05/2019 3.80    • ALT (SGPT) 09/05/2019 22    • AST (SGOT) 09/05/2019 20    •  Alkaline Phosphatase 09/05/2019 123*   • Total Bilirubin 09/05/2019 0.7    • eGFR Non  Amer 09/05/2019 59*   • Globulin 09/05/2019 3.5    • A/G Ratio 09/05/2019 1.1    • BUN/Creatinine Ratio 09/05/2019 10.8    • Anion Gap 09/05/2019 18.2*   • Protime 09/05/2019 10.6    • INR 09/05/2019 1.04    • WBC 09/05/2019 6.00    • RBC 09/05/2019 5.01    • Hemoglobin 09/05/2019 14.9    • Hematocrit 09/05/2019 44.7    • MCV 09/05/2019 89.2    • MCH 09/05/2019 29.8    • MCHC 09/05/2019 33.4    • RDW 09/05/2019 13.2    • RDW-SD 09/05/2019 41.6    • MPV 09/05/2019 8.8    • Platelets 09/05/2019 167    • Neutrophil % 09/05/2019 52.8    • Lymphocyte % 09/05/2019 30.2    • Monocyte % 09/05/2019 11.1    • Eosinophil % 09/05/2019 5.1    • Basophil % 09/05/2019 0.8    • Neutrophils, Absolute 09/05/2019 3.20    • Lymphocytes, Absolute 09/05/2019 1.80    • Monocytes, Absolute 09/05/2019 0.70    • Eosinophils, Absolute 09/05/2019 0.30    • Basophils, Absolute 09/05/2019 0.00    • nRBC 09/05/2019 0.1    Hospital Outpatient Visit on 08/23/2019   Component Date Value   • Target HR (85%) 08/23/2019 123    • Max. Pred. HR (100%) 08/23/2019 145    • EF(MOD-bp) 08/23/2019 54.0    • LA dimension(2D) 08/23/2019 4.4               Invalid input(s): ALKPO4                        Invalid input(s): LDLCALC                Assessment:          Diagnosis Plan   1. Coronary artery disease due to lipid rich plaque     2. Mixed hyperlipidemia     3. Essential hypertension     4. Type 2 diabetes mellitus without complication, with long-term current use of insulin (CMS/[facility])            Plan:         1. Coronary artery disease due to lipid rich plaque  Clinically silent was PCI and stenting.  Continue medical therapy secondary prevention for the development of ischemic heart disease.    2. Mixed hyperlipidemia  Well-controlled on medical therapy    3. Essential hypertension  Well-controlled on medical therapy; today his blood pressure is mildly elevated  which is an outlier.    4. Type 2 diabetes mellitus without complication, with long-term current use of insulin (CMS/[facility])  Controlled      Level of Care:                 Gonzalez Ochoa MD  10/14/19  .

## 2019-10-14 NOTE — TELEPHONE ENCOUNTER
10/14/2019- called and left voice message for pt to call back if he is still interested in CR after his OV on 10/11/19 with DR Fu his cardiologist. Pt had stated in earlier phone call not sure if wanted to participate and would call after appt with DR Ochoa. No mention of CR in OV notes from 10/11/2019.  T.Judith RN CCRP

## 2020-01-10 ENCOUNTER — OFFICE VISIT (OUTPATIENT)
Dept: CARDIOLOGY | Facility: CLINIC | Age: 75
End: 2020-01-10

## 2020-01-10 VITALS
BODY MASS INDEX: 37.33 KG/M2 | HEIGHT: 69 IN | RESPIRATION RATE: 20 BRPM | WEIGHT: 252 LBS | SYSTOLIC BLOOD PRESSURE: 176 MMHG | DIASTOLIC BLOOD PRESSURE: 88 MMHG | OXYGEN SATURATION: 96 % | HEART RATE: 77 BPM

## 2020-01-10 DIAGNOSIS — I48.0 PAROXYSMAL ATRIAL FIBRILLATION (HCC): Primary | ICD-10-CM

## 2020-01-10 DIAGNOSIS — E78.2 MIXED HYPERLIPIDEMIA: ICD-10-CM

## 2020-01-10 DIAGNOSIS — Z79.4 TYPE 2 DIABETES MELLITUS WITHOUT COMPLICATION, WITH LONG-TERM CURRENT USE OF INSULIN (HCC): ICD-10-CM

## 2020-01-10 DIAGNOSIS — E11.9 TYPE 2 DIABETES MELLITUS WITHOUT COMPLICATION, WITH LONG-TERM CURRENT USE OF INSULIN (HCC): ICD-10-CM

## 2020-01-10 DIAGNOSIS — I20.0 UNSTABLE ANGINA (HCC): ICD-10-CM

## 2020-01-10 DIAGNOSIS — I48.91 NEW ONSET ATRIAL FIBRILLATION (HCC): ICD-10-CM

## 2020-01-10 DIAGNOSIS — I25.83 CORONARY ARTERY DISEASE DUE TO LIPID RICH PLAQUE: ICD-10-CM

## 2020-01-10 DIAGNOSIS — I25.10 CAD S/P PERCUTANEOUS CORONARY ANGIOPLASTY: ICD-10-CM

## 2020-01-10 DIAGNOSIS — Z98.61 CAD S/P PERCUTANEOUS CORONARY ANGIOPLASTY: ICD-10-CM

## 2020-01-10 DIAGNOSIS — I10 ESSENTIAL HYPERTENSION: ICD-10-CM

## 2020-01-10 DIAGNOSIS — I25.10 CORONARY ARTERY DISEASE DUE TO LIPID RICH PLAQUE: ICD-10-CM

## 2020-01-10 PROCEDURE — 99214 OFFICE O/P EST MOD 30 MIN: CPT | Performed by: INTERNAL MEDICINE

## 2020-01-10 RX ORDER — AMIODARONE HYDROCHLORIDE 200 MG/1
200 TABLET ORAL DAILY
Qty: 30 TABLET | Refills: 11 | Status: SHIPPED | OUTPATIENT
Start: 2020-01-10 | End: 2020-01-20 | Stop reason: SDUPTHER

## 2020-01-10 RX ORDER — AMIODARONE HYDROCHLORIDE 400 MG/1
400 TABLET ORAL 3 TIMES DAILY
Qty: 21 TABLET | Refills: 0 | Status: SHIPPED | OUTPATIENT
Start: 2020-01-10 | End: 2020-01-24

## 2020-01-11 PROBLEM — I48.91 NEW ONSET ATRIAL FIBRILLATION (HCC): Status: ACTIVE | Noted: 2020-01-11

## 2020-01-11 NOTE — PROGRESS NOTES
Subjective:     Encounter Date:01/10/2020      Patient ID: Carroll Rodgers is a 74 y.o. male.    Chief Complaint:  Chief Complaint   Patient presents with   • Chest Pain   • Shortness of Breath   • Coronary Artery Disease   • Hypertension   • Hyperlipidemia       HPI:  Carroll is a very pleasant 74-year-old patient initially self-referred.  He is previously seen cardiologist in [facility].   He has known coronary artery disease and is status post coronary artery bypass grafting in 1996 with a LIMA to the LAD and SVG to the RCA and circumflex artery.  He subsequently had total occlusion of his SVG graft to the obtuse marginal branch and underwent stenting with a drug-eluting stent 7/1/2015 as well as his SVG graft to the right coronary artery.  Subsequently he developed exertional and rest angina with his worst symptom being exertional dyspnea.  Cardiac catheterization performed 1/30/2018 showed critical stenosis of the jailed segment of the inferior branch of the circumflex obtuse marginal branch with what appeared to be 70% in-stent restenosis of the superior parent limb of the circumflex into the superior limb.  He was told that medical therapy was his best option.  Since then he has had worsening angina on Ranexa and worsening dyspnea.    He came for second opinion.    I personally reviewed the cardiac catheterization images from 1/30/2018 and confirmed the above results.  In addition he has a patent LIMA to the LAD with a distal apical 80% narrowing that is inconsequential.  He has a chronic total occlusion of his SVG graft to the his right coronary artery and again an occlusion of his SVG to the OM.  The circumflex OM native vessel is as described above.  His ECG last visit in the office showed normal sinus rhythm with APCs and a old left bundle branch block.      His chest pain syndrome was that of exertional angina often if not always associated with dyspnea with chronic stable angina class III symptoms  versus unstable angina given the increase in frequency.  He does not have associated nausea or diaphoresis.    He underwent PCI and stenting of the circumflex artery 9/9/2019 with an excellent angiographic result with restoration of MONIE-3 flow.  Since then he had done well.    However he comes in today complaining of exertional dyspnea with one episode of acute onset chest pain that lasts approximately 1 minute similar to his previous angina pectoris.  Performed an ECG in the office today which showed atrial fibrillation rate controlled at 78 bpm which is a new finding with poor R wave progression.  He has not had any chest pain for the past week.  Of note he did start his Isordil back.  He has been taking his dual antiplatelet therapy with very little interruption per his wife.  Nitroglycerin appeared to relieve his initial pain described above.    His overall complaint today is really exertional dyspnea which is most likely due to his new onset atrial fibrillation.  I personally reviewed his echo from 8/23/2019 which showed normal LV systolic function with diastolic grade 1A impaired relaxation normal RV function with no significant valvular heart disease.       The following portions of the patient's history were reviewed and updated as appropriate: He  has a past medical history of Coronary artery disease, Diabetes mellitus (CMS/[facility]), Hyperlipidemia, Hypertension, Hypothyroidism, New onset atrial fibrillation (CMS/[facility]) (1/11/2020), Stroke (CMS/[facility]), and Unstable angina (CMS/[facility]) (8/22/2019)..    Problem List:  Patient Active Problem List   Diagnosis   • Type 2 diabetes mellitus without complication, with long-term current use of insulin (CMS/[facility])   • Mixed hyperlipidemia   • Essential hypertension   • Acquired hypothyroidism   • Coronary artery disease due to lipid rich plaque   • Unstable angina (CMS/[facility])   • CAD S/P percutaneous coronary angioplasty   • New onset atrial fibrillation (CMS/[facility])       Past  Medical History:  Past Medical History:   Diagnosis Date   • Coronary artery disease    • Diabetes mellitus (CMS/[facility])    • Hyperlipidemia    • Hypertension    • Hypothyroidism    • New onset atrial fibrillation (CMS/[facility]) 1/11/2020   • Stroke (CMS/[facility])     X2   • Unstable angina (CMS/[facility]) 8/22/2019       Past Surgical History:  Past Surgical History:   Procedure Laterality Date   • CARDIAC CATHETERIZATION  01/30/2018    EF 40%, Distal LM 50% stenosis, Proximal LM 50% stenosis, Proximal LAD 95% stenosis, Mid % stenosis, Apical LAD 70% stenosis, Significant ISR at bifurcation, 90% stenosis at circumflex portoin, 90% stenosis at ostial OM, distal circumflex 80% stenosis, Mid % stenosis, SVG to % occluded at ostium, SVG to OM branches 100% occluded, LIMA to LAD has no significant disease   • CARDIAC CATHETERIZATION N/A 9/9/2019    PCI to OM1 and OM2 of LCX using overlapping Xience LARISA' and [place] LARISA. PCI to LM into distal LM and ostial proximal circ using Xience LARISA. Laser atherectomy.   • CARDIAC CATHETERIZATION N/A 9/9/2019    Procedure: Atherectomy-coronary;  Surgeon: Gonzalez Ochoa MD;  Location: [facility] INVASIVE LOCATION;  Service: Cardiology   • CORONARY ARTERY BYPASS GRAFT  1996    X3   • CORONARY STENT PLACEMENT  07/01/2015    Xience Alpine LARISA to RCA (X2) and Mid Circ (X1)-Dr. Rodriguez   • CORONARY STENT PLACEMENT  2006   • CORONARY STENT PLACEMENT  2010       Social History:  Social History     Socioeconomic History   • Marital status:      Spouse name: Not on file   • Number of children: Not on file   • Years of education: Not on file   • Highest education level: Not on file   Tobacco Use   • Smoking status: Former Smoker   • Smokeless tobacco: Never Used   • Tobacco comment: 40 years ago   Substance and Sexual Activity   • Alcohol use: Yes   • Drug use: No   • Sexual activity: Defer       Allergies:  Allergies   Allergen Reactions   • Peanut-Containing Drug Products   "      Review of Symptoms:  Constitutional: Patient afebrile no chills or unexpected weight changes  Respiratory: No cough, no wheezing with dyspnea  Cardiovascular: No chest pain today, palpitations, with dyspnea, no orthopnea and no edema  Gastrointestinal: No nausea, vomiting, constipation or diarrhea.  No melena or dark stools    All other systems reviewed and are negative         Objective:         /88 (BP Location: Left arm, Patient Position: Sitting, Cuff Size: Large Adult)   Pulse 77   Resp 20   Ht 175.3 cm (69\")   Wt 114 kg (252 lb)   SpO2 96%   BMI 37.21 kg/m²     Physical exam  Constitutional: well-nourished, and appears stated age in no acute distress  PERRL: Conjunctiva clear, no pallor, anicteric  HENMT: normocephalic, normal dentition, no cyanosis or pallor  Neck:no bruits, or thrills and bilateral normal carotid upstroke. Normal jugular venous pressure  Cardiovascular: No parasternal heaves an non-displaced focal PMI. Normal rate and rhythm: no rub, gallop, murmur or click and normal S1 and S2; no lower or upper extremity edema.   Lungs: unlabored, no wheezing with no rales or rhonchi on auscultation.  Extremities: Warm, no clubbing, cyanosis. Full and equal peripheral pulses in extremities with no bruits appreciated.   Abdomen: soft, non-tender, non-distended  Musculoskeletal: no joint tenderness or swelling and no erythema  Skin: Warm and dry, non-erythematous   Neuro:alert and normal affect. Oriented to time, place and person.   Irregular    In-Office Procedure(s):  Procedures    ASCVD RIsk Score::  The ASCVD Risk score (Alec DC Jr., et al., 2013) failed to calculate for the following reasons:    Cannot find a previous HDL lab    Cannot find a previous total cholesterol lab    Recent Radiology:  Imaging Results (Most Recent)     None          Lab Review:   No visits with results within 2 Month(s) from this visit.   Latest known visit with results is:   Admission on 09/09/2019, " Discharged on 09/10/2019   Component Date Value   • Glucose 09/09/2019 190*   • Activated Clotting Time  09/09/2019 307*   • Activated Clotting Time  09/09/2019 268*   • Activated Clotting Time  09/09/2019 246*   • Activated Clotting Time  09/09/2019 230*   • Glucose 09/09/2019 102    • Glucose 09/10/2019 199*   • BUN 09/10/2019 13    • Creatinine 09/10/2019 1.30*   • Sodium 09/10/2019 135*   • Potassium 09/10/2019 4.4    • Chloride 09/10/2019 101    • CO2 09/10/2019 26.0    • Calcium 09/10/2019 8.5*   • eGFR Non African Amer 09/10/2019 54*   • BUN/Creatinine Ratio 09/10/2019 10.0    • Anion Gap 09/10/2019 12.4    • Magnesium 09/10/2019 1.6*   • Glucose 09/10/2019 190*              Invalid input(s): ALKPO4                        Invalid input(s): LDLCALC                Assessment:          Diagnosis Plan   1. Paroxysmal atrial fibrillation (CMS/HCC)  amiodarone (PACERONE) 400 MG tablet    amiodarone (PACERONE) 200 MG tablet    rivaroxaban (XARELTO) 20 MG tablet   2. Unstable angina (CMS/HCC)     3. Mixed hyperlipidemia     4. Essential hypertension     5. Coronary artery disease due to lipid rich plaque     6. Type 2 diabetes mellitus without complication, with long-term current use of insulin (CMS/HCC)     7. CAD S/P percutaneous coronary angioplasty     8. New onset atrial fibrillation (CMS/HCC)            Plan:         1.  New onset atrial fibrillation (CMS/HCC) a new finding to me  This is likely the culprit for his exertional dyspnea.  We now need to initiate anticoagulation and start antiarrhythmic therapy.  Plan is to allow for 10 days of amiodarone loading and maintenance therapy and repeat his ECG in 10 days.  - amiodarone (PACERONE) 400 MG tablet; Take 1 tablet by mouth 3 (Three) Times a Day.  Dispense: 21 tablet; Refill: 0  - amiodarone (PACERONE) 200 MG tablet; Take 1 tablet by mouth Daily.  Dispense: 30 tablet; Refill: 11  - rivaroxaban (XARELTO) 20 MG tablet; Take 1 tablet by mouth Daily.  Dispense: 30  tablet; Refill: 11    2. Unstable angina (CMS/HCC)  Happened 1 time.  To treat this conservatively but have a low threshold to return to the Cath Lab if it recurs.  He may have mechanical complication of his PCI and stenting versus a new lesion.    3. Mixed hyperlipidemia  Well-controlled on medical therapy    4. Essential hypertension  Well-controlled on medical therapy    5. Coronary artery disease due to lipid rich plaque  Currently at this time clinically silent with no recurrence of chest pain.  Continue medical therapy secondary prevention for the development of ischemic heart disease including aspirin and Plavix.    6. Type 2 diabetes mellitus without complication, with long-term current use of insulin (CMS/HCC)  Being managed on medical therapy well-controlled    7. CAD S/P percutaneous coronary angioplasty  Successful PCI and stenting.      Level of Care:                 Gonzalez Ochoa MD  01/11/20  .

## 2020-01-17 RX ORDER — AMLODIPINE BESYLATE 10 MG/1
10 TABLET ORAL DAILY
Qty: 30 TABLET | Refills: 11 | OUTPATIENT
Start: 2020-01-17

## 2020-01-20 ENCOUNTER — CLINICAL SUPPORT (OUTPATIENT)
Dept: CARDIOLOGY | Facility: CLINIC | Age: 75
End: 2020-01-20

## 2020-01-20 DIAGNOSIS — I48.0 AF (PAROXYSMAL ATRIAL FIBRILLATION) (HCC): Primary | ICD-10-CM

## 2020-01-20 DIAGNOSIS — I48.0 PAROXYSMAL ATRIAL FIBRILLATION (HCC): ICD-10-CM

## 2020-01-20 PROCEDURE — 93000 ELECTROCARDIOGRAM COMPLETE: CPT | Performed by: INTERNAL MEDICINE

## 2020-01-20 RX ORDER — AMIODARONE HYDROCHLORIDE 200 MG/1
200 TABLET ORAL DAILY
Qty: 30 TABLET | Refills: 11 | Status: SHIPPED | OUTPATIENT
Start: 2020-01-20 | End: 2020-01-22 | Stop reason: SDUPTHER

## 2020-01-22 DIAGNOSIS — I48.0 PAROXYSMAL ATRIAL FIBRILLATION (HCC): ICD-10-CM

## 2020-01-22 RX ORDER — AMIODARONE HYDROCHLORIDE 200 MG/1
200 TABLET ORAL DAILY
Qty: 90 TABLET | Refills: 3 | Status: SHIPPED | OUTPATIENT
Start: 2020-01-22 | End: 2020-03-02 | Stop reason: SINTOL

## 2020-01-24 ENCOUNTER — TELEPHONE (OUTPATIENT)
Dept: CARDIOLOGY | Facility: CLINIC | Age: 75
End: 2020-01-24

## 2020-01-24 NOTE — TELEPHONE ENCOUNTER
Pt called reporting that he had a spot of dry blood in his underwear last night and again this morning that appears to be coming from his penis. No blood in urine and no skin lesions. Pt is taking Xarelto for new afib and on Plavix for stent placed 9/2019. Info sent to Dr. Ochoa but he is out of town so also forwarded to Dr. Al. Deb

## 2020-01-24 NOTE — TELEPHONE ENCOUNTER
Per Don, pt needs to see urology ASAP because this is not normal even with Xarelto and Plavix.   Soonest appt was with Dr. Dillon Young in [place], [place] on Monday 1/27/2020 at 9:45am. Records faxed to [phone]. Pt notified and agreeable. Deb

## 2020-02-28 ENCOUNTER — LAB (OUTPATIENT)
Dept: LAB | Facility: HOSPITAL | Age: 75
End: 2020-02-28

## 2020-02-28 ENCOUNTER — OFFICE VISIT (OUTPATIENT)
Dept: CARDIOLOGY | Facility: CLINIC | Age: 75
End: 2020-02-28

## 2020-02-28 VITALS
RESPIRATION RATE: 20 BRPM | OXYGEN SATURATION: 97 % | DIASTOLIC BLOOD PRESSURE: 70 MMHG | WEIGHT: 250 LBS | HEIGHT: 69 IN | HEART RATE: 78 BPM | BODY MASS INDEX: 37.03 KG/M2 | SYSTOLIC BLOOD PRESSURE: 150 MMHG

## 2020-02-28 DIAGNOSIS — I25.83 CORONARY ARTERY DISEASE DUE TO LIPID RICH PLAQUE: ICD-10-CM

## 2020-02-28 DIAGNOSIS — I50.31 ACUTE DIASTOLIC CHF (CONGESTIVE HEART FAILURE) (HCC): ICD-10-CM

## 2020-02-28 DIAGNOSIS — Z79.899 LONG TERM CURRENT USE OF AMIODARONE: ICD-10-CM

## 2020-02-28 DIAGNOSIS — I25.10 CORONARY ARTERY DISEASE DUE TO LIPID RICH PLAQUE: ICD-10-CM

## 2020-02-28 DIAGNOSIS — I48.0 AF (PAROXYSMAL ATRIAL FIBRILLATION) (HCC): ICD-10-CM

## 2020-02-28 DIAGNOSIS — I48.91 NEW ONSET ATRIAL FIBRILLATION (HCC): ICD-10-CM

## 2020-02-28 DIAGNOSIS — Z79.899 LONG TERM CURRENT USE OF AMIODARONE: Primary | ICD-10-CM

## 2020-02-28 DIAGNOSIS — I10 ESSENTIAL HYPERTENSION: ICD-10-CM

## 2020-02-28 DIAGNOSIS — E78.2 MIXED HYPERLIPIDEMIA: ICD-10-CM

## 2020-02-28 LAB
ALBUMIN SERPL-MCNC: 3.9 G/DL (ref 3.5–5.2)
ALBUMIN/GLOB SERPL: 1.2 G/DL
ALP SERPL-CCNC: 173 U/L (ref 39–117)
ALT SERPL W P-5'-P-CCNC: 17 U/L (ref 1–41)
ANION GAP SERPL CALCULATED.3IONS-SCNC: 14 MMOL/L (ref 5–15)
AST SERPL-CCNC: 17 U/L (ref 1–40)
BASOPHILS # BLD AUTO: 0.04 10*3/MM3 (ref 0–0.2)
BASOPHILS NFR BLD AUTO: 0.6 % (ref 0–1.5)
BILIRUB SERPL-MCNC: 0.2 MG/DL (ref 0.2–1.2)
BUN BLD-MCNC: 15 MG/DL (ref 8–23)
BUN/CREAT SERPL: 10.4 (ref 7–25)
CALCIUM SPEC-SCNC: 8.6 MG/DL (ref 8.6–10.5)
CHLORIDE SERPL-SCNC: 96 MMOL/L (ref 98–107)
CO2 SERPL-SCNC: 26 MMOL/L (ref 22–29)
CREAT BLD-MCNC: 1.44 MG/DL (ref 0.76–1.27)
DEPRECATED RDW RBC AUTO: 42.9 FL (ref 37–54)
EOSINOPHIL # BLD AUTO: 0.1 10*3/MM3 (ref 0–0.4)
EOSINOPHIL NFR BLD AUTO: 1.5 % (ref 0.3–6.2)
ERYTHROCYTE [DISTWIDTH] IN BLOOD BY AUTOMATED COUNT: 13.1 % (ref 12.3–15.4)
GFR SERPL CREATININE-BSD FRML MDRD: 48 ML/MIN/1.73
GLOBULIN UR ELPH-MCNC: 3.2 GM/DL
GLUCOSE BLD-MCNC: 229 MG/DL (ref 65–99)
HCT VFR BLD AUTO: 43.9 % (ref 37.5–51)
HGB BLD-MCNC: 14.6 G/DL (ref 13–17.7)
IMM GRANULOCYTES # BLD AUTO: 0.02 10*3/MM3 (ref 0–0.05)
IMM GRANULOCYTES NFR BLD AUTO: 0.3 % (ref 0–0.5)
LYMPHOCYTES # BLD AUTO: 1.27 10*3/MM3 (ref 0.7–3.1)
LYMPHOCYTES NFR BLD AUTO: 18.8 % (ref 19.6–45.3)
MCH RBC QN AUTO: 29.5 PG (ref 26.6–33)
MCHC RBC AUTO-ENTMCNC: 33.3 G/DL (ref 31.5–35.7)
MCV RBC AUTO: 88.7 FL (ref 79–97)
MONOCYTES # BLD AUTO: 1.02 10*3/MM3 (ref 0.1–0.9)
MONOCYTES NFR BLD AUTO: 15.1 % (ref 5–12)
NEUTROPHILS # BLD AUTO: 4.3 10*3/MM3 (ref 1.7–7)
NEUTROPHILS NFR BLD AUTO: 63.7 % (ref 42.7–76)
NRBC BLD AUTO-RTO: 0 /100 WBC (ref 0–0.2)
PLATELET # BLD AUTO: 149 10*3/MM3 (ref 140–450)
PMV BLD AUTO: 11.8 FL (ref 6–12)
POTASSIUM BLD-SCNC: 4.1 MMOL/L (ref 3.5–5.2)
PROT SERPL-MCNC: 7.1 G/DL (ref 6–8.5)
RBC # BLD AUTO: 4.95 10*6/MM3 (ref 4.14–5.8)
SODIUM BLD-SCNC: 136 MMOL/L (ref 136–145)
T-UPTAKE NFR SERPL: 0.91 TBI (ref 0.8–1.3)
T4 SERPL-MCNC: 10.1 MCG/DL (ref 4.5–11.7)
TSH SERPL DL<=0.05 MIU/L-ACNC: 5.67 UIU/ML (ref 0.27–4.2)
WBC NRBC COR # BLD: 6.75 10*3/MM3 (ref 3.4–10.8)

## 2020-02-28 PROCEDURE — 99214 OFFICE O/P EST MOD 30 MIN: CPT | Performed by: INTERNAL MEDICINE

## 2020-02-28 PROCEDURE — 84436 ASSAY OF TOTAL THYROXINE: CPT

## 2020-02-28 PROCEDURE — 84479 ASSAY OF THYROID (T3 OR T4): CPT

## 2020-02-28 PROCEDURE — 36415 COLL VENOUS BLD VENIPUNCTURE: CPT

## 2020-02-28 PROCEDURE — 93000 ELECTROCARDIOGRAM COMPLETE: CPT | Performed by: INTERNAL MEDICINE

## 2020-02-28 PROCEDURE — 80053 COMPREHEN METABOLIC PANEL: CPT

## 2020-02-28 PROCEDURE — 85025 COMPLETE CBC W/AUTO DIFF WBC: CPT

## 2020-02-28 PROCEDURE — 84443 ASSAY THYROID STIM HORMONE: CPT

## 2020-03-02 RX ORDER — METOPROLOL SUCCINATE 25 MG/1
25 TABLET, EXTENDED RELEASE ORAL DAILY
Qty: 90 TABLET | Refills: 3 | Status: SHIPPED | OUTPATIENT
Start: 2020-03-02

## 2020-03-02 NOTE — PROGRESS NOTES
Subjective:     Encounter Date:02/28/2020      Patient ID: Carroll Rodgers is a 74 y.o. male.    Chief Complaint:  Chief Complaint   Patient presents with   • Atrial Fibrillation   • Shortness of Breath       HPI:  Carroll is a very pleasant 74-year-old patient initially self-referred.  He is previously seen cardiologist in [facility].   He has known coronary artery disease and is status post coronary artery bypass grafting in 1996 with a LIMA to the LAD and SVG to the RCA and circumflex artery.  He subsequently had total occlusion of his SVG graft to the obtuse marginal branch and underwent stenting with a drug-eluting stent 7/1/2015 as well as his SVG graft to the right coronary artery.  Subsequently he developed exertional and rest angina with his worst symptom being exertional dyspnea.  Cardiac catheterization performed 1/30/2018 showed critical stenosis of the jailed segment of the inferior branch of the circumflex obtuse marginal branch with what appeared to be 70% in-stent restenosis of the superior parent limb of the circumflex into the superior limb.  He was told that medical therapy was his best option.  Since then he has had worsening angina on Ranexa and worsening dyspnea.    He came for second opinion.    I personally reviewed the cardiac catheterization images from 1/30/2018 and confirmed the above results.  In addition he has a patent LIMA to the LAD with a distal apical 80% narrowing that is inconsequential.  He has a chronic total occlusion of his SVG graft to the his right coronary artery and again an occlusion of his SVG to the OM.  The circumflex OM native vessel is as described above.  His ECG last visit in the office showed normal sinus rhythm with APCs and a old left bundle branch block.      His chest pain syndrome was that of exertional angina often if not always associated with dyspnea with chronic stable angina class III symptoms versus unstable angina given the increase in frequency.   He does not have associated nausea or diaphoresis.    He underwent PCI and stenting of the circumflex artery 9/9/2019 with an excellent angiographic result with restoration of MONIE-3 flow.  Since then he had done well.    However he comes in today complaining of exertional dyspnea with one episode of acute onset chest pain that lasts approximately 1 minute similar to his previous angina pectoris.  Performed an ECG in the office today which showed atrial fibrillation rate controlled at 78 bpm which is a new finding with poor R wave progression.  He has not had any chest pain for the past week.  Of note he did start his Isordil back.  He has been taking his dual antiplatelet therapy with very little interruption per his wife.  Nitroglycerin appeared to relieve his initial pain described above.    Last visit he was found to have new onset atrial fibrillation.  I personally reviewed his echo from 8/23/2019 which showed normal LV systolic function with diastolic grade 1A impaired relaxation normal RV function with no significant valvular heart disease.  He was loaded with amiodarone which chemically converted him to normal sinus rhythm.  He is felt a little bit better since then.  However he still significantly short of breath with intermittent chest pain meds somewhat atypical.  He is never had pulmonary function tests or had no evaluation for amiodarone induced hypothyroidism.    The following portions of the patient's history were reviewed and updated as appropriate: allergies, current medications, past family history, past medical history, past social history, past surgical history and problem list.    Problem List:  Patient Active Problem List   Diagnosis   • Type 2 diabetes mellitus without complication, with long-term current use of insulin (CMS/[facility])   • Mixed hyperlipidemia   • Essential hypertension   • Acquired hypothyroidism   • Coronary artery disease due to lipid rich plaque   • Unstable angina (CMS/HCC)   •  CAD S/P percutaneous coronary angioplasty   • New onset atrial fibrillation (CMS/[facility])       Past Medical History:  Past Medical History:   Diagnosis Date   • Coronary artery disease    • Diabetes mellitus (CMS/[facility])    • Hyperlipidemia    • Hypertension    • Hypothyroidism    • New onset atrial fibrillation (CMS/[facility]) 1/11/2020   • Stroke (CMS/[facility])     X2   • Unstable angina (CMS/[facility]) 8/22/2019       Past Surgical History:  Past Surgical History:   Procedure Laterality Date   • CARDIAC CATHETERIZATION  01/30/2018    EF 40%, Distal LM 50% stenosis, Proximal LM 50% stenosis, Proximal LAD 95% stenosis, Mid % stenosis, Apical LAD 70% stenosis, Significant ISR at bifurcation, 90% stenosis at circumflex portoin, 90% stenosis at ostial OM, distal circumflex 80% stenosis, Mid % stenosis, SVG to % occluded at ostium, SVG to OM branches 100% occluded, LIMA to LAD has no significant disease   • CARDIAC CATHETERIZATION N/A 9/9/2019    PCI to OM1 and OM2 of LCX using overlapping Xience LARISA' and Brogue LARISA. PCI to LM into distal LM and ostial proximal circ using Xience LARISA. Laser atherectomy.   • CARDIAC CATHETERIZATION N/A 9/9/2019    Procedure: Atherectomy-coronary;  Surgeon: Gonzalez Ochoa MD;  Location: [facility] CATH INVASIVE LOCATION;  Service: Cardiology   • CORONARY ARTERY BYPASS GRAFT  1996    X3   • CORONARY STENT PLACEMENT  07/01/2015    Xience [place]pine LARISA to RCA (X2) and Mid Circ (X1)-Dr. Rodriguez   • CORONARY STENT PLACEMENT  2006   • CORONARY STENT PLACEMENT  2010       Social History:  Social History     Socioeconomic History   • Marital status:      Spouse name: Not on file   • Number of children: Not on file   • Years of education: Not on file   • Highest education level: Not on file   Tobacco Use   • Smoking status: Former Smoker   • Smokeless tobacco: Never Used   • Tobacco comment: 40 years ago   Substance and Sexual Activity   • Alcohol use: Yes   • Drug use: No   • Sexual  "activity: Defer       Allergies:  Allergies   Allergen Reactions   • Peanut-Containing Drug Products          Review of Symptoms:  Constitutional: Patient afebrile no chills or unexpected weight changes but significant fatigue  Respiratory: No cough, no wheezing with dyspnea  Cardiovascular: No chest pain, palpitations, with dyspnea, but no orthopnea and no edema  Gastrointestinal: No nausea, vomiting, constipation or diarrhea.  No melena or dark stools    All other systems reviewed and are negative         Objective:         /70 (BP Location: Left arm, Patient Position: Sitting, Cuff Size: Large Adult)   Pulse 78   Resp 20   Ht 175.3 cm (69\")   Wt 113 kg (250 lb)   SpO2 97%   BMI 36.92 kg/m²     Physical exam  Constitutional: well-nourished, and appears stated age in no acute distress  PERRL: Conjunctiva clear, no pallor, anicteric  HENMT: normocephalic, normal dentition, no cyanosis or pallor  Neck:no bruits, or thrills and bilateral normal carotid upstroke. Normal jugular venous pressure  Cardiovascular: No parasternal heaves an non-displaced focal PMI. Normal rate and rhythm: no rub, gallop, murmur or click and normal S1 and S2; no lower or upper extremity edema.   Lungs: unlabored, no wheezing with no rales or rhonchi on auscultation.  Extremities: Warm, no clubbing, cyanosis. Full and equal peripheral pulses in extremities with no bruits appreciated.   Abdomen: soft, non-tender, non-distended  Musculoskeletal: no joint tenderness or swelling and no erythema  Skin: Warm and dry, non-erythematous   Neuro:alert and normal affect. Oriented to time, place and person.       In-Office Procedure(s):    ECG 12 Lead  Date/Time: 2/28/2020 1:19 PM  Performed by: Gonzalez Ochoa MD  Authorized by: Gonzalez Ochoa MD   Comparison: not compared with previous ECG   Rhythm: sinus rhythm  Comments: Left bundle branch block            ASCVD RIsk Score::  The ASCVD Risk score (Alecmerrick RODRIGUEZ Jr., et " al., 2013) failed to calculate for the following reasons:    Cannot find a previous HDL lab    Cannot find a previous total cholesterol lab    Recent Radiology:  Imaging Results (Most Recent)     None          Lab Review:   No visits with results within 2 Month(s) from this visit.   Latest known visit with results is:   Admission on 09/09/2019, Discharged on 09/10/2019   Component Date Value   • Glucose 09/09/2019 190*   • Activated Clotting Time  09/09/2019 307*   • Activated Clotting Time  09/09/2019 268*   • Activated Clotting Time  09/09/2019 246*   • Activated Clotting Time  09/09/2019 230*   • Glucose 09/09/2019 102    • Glucose 09/10/2019 199*   • BUN 09/10/2019 13    • Creatinine 09/10/2019 1.30*   • Sodium 09/10/2019 135*   • Potassium 09/10/2019 4.4    • Chloride 09/10/2019 101    • CO2 09/10/2019 26.0    • Calcium 09/10/2019 8.5*   • eGFR Non African Amer 09/10/2019 54*   • BUN/Creatinine Ratio 09/10/2019 10.0    • Anion Gap 09/10/2019 12.4    • Magnesium 09/10/2019 1.6*   • Glucose 09/10/2019 190*        Results from last 7 days   Lab Units 02/28/20  1102   SODIUM mmol/L 136   POTASSIUM mmol/L 4.1   CHLORIDE mmol/L 96*   CO2 mmol/L 26.0   BUN mg/dL 15   CREATININE mg/dL 1.44*   GLUCOSE mg/dL 229*   CALCIUM mg/dL 8.6   AST (SGOT) U/L 17   ALT (SGPT) U/L 17         Results from last 7 days   Lab Units 02/28/20  1102   WBC 10*3/mm3 6.75   HEMOGLOBIN g/dL 14.6   HEMATOCRIT % 43.9   PLATELETS 10*3/mm3 149                   Invalid input(s): LDLCALC      Results from last 7 days   Lab Units 02/28/20  1102   TSH uIU/mL 5.670*           Assessment:          Diagnosis Plan   1. Long term current use of amiodarone  Thyroid Panel With TSH    Comprehensive Metabolic Panel    CBC & Differential    Full Pulmonary Function Test With Bronchodilator    XR Chest 2 View    ECG 12 Lead   2. Acute diastolic CHF (congestive heart failure) (CMS/HCC)  Adult Transthoracic Echo Complete W/ Cont if Necessary Per Protocol   3. AF  (paroxysmal atrial fibrillation) (CMS/HCC)  ECG 12 Lead   4. New onset atrial fibrillation (CMS/HCC)     5. Mixed hyperlipidemia     6. Essential hypertension     7. Coronary artery disease due to lipid rich plaque            Plan:         1. Long term current use of amiodarone  Patient had thyroid function panel drawn and was found to have significantly elevated TSH (see above; 5.7).  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  We are going to stop his amiodarone and start him on AV porfirio agent if he can tolerate.  - Thyroid Panel With TSH; Future  - Comprehensive Metabolic Panel; Future  - CBC & Differential; Future  - Full Pulmonary Function Test With Bronchodilator; Future  - XR Chest 2 View; Future  - ECG 12 Lead    2. Acute diastolic CHF (congestive heart failure) (CMS/HCC)  Rule out worsening LV systolic and diastolic function or worsening valvular heart disease  - Adult Transthoracic Echo Complete W/ Cont if Necessary Per Protocol; Future    3. AF (paroxysmal atrial fibrillation) (CMS/HCC)  Normal sinus rhythm currently.  Continue anticoagulation  - ECG 12 Lead    4. New onset atrial fibrillation (CMS/HCC)  See above    5. Mixed hyperlipidemia  Well-controlled on medical therapy    6. Essential hypertension  Well-controlled on medical therapy    7. Coronary artery disease due to lipid rich plaque  Most likely clinically silent.    In addition were going to get a chest x-ray and PFTs to rule out primary pulmonary disease.      Level of Care:                 Gonzalez Ochoa MD  03/02/20  .

## 2020-03-10 ENCOUNTER — APPOINTMENT (OUTPATIENT)
Dept: RESPIRATORY THERAPY | Facility: HOSPITAL | Age: 75
End: 2020-03-10

## 2020-03-10 ENCOUNTER — APPOINTMENT (OUTPATIENT)
Dept: CARDIOLOGY | Facility: HOSPITAL | Age: 75
End: 2020-03-10

## 2020-03-19 ENCOUNTER — APPOINTMENT (OUTPATIENT)
Dept: RESPIRATORY THERAPY | Facility: HOSPITAL | Age: 75
End: 2020-03-19

## 2020-03-19 ENCOUNTER — APPOINTMENT (OUTPATIENT)
Dept: CARDIOLOGY | Facility: HOSPITAL | Age: 75
End: 2020-03-19

## 2020-05-27 ENCOUNTER — HOSPITAL ENCOUNTER (OUTPATIENT)
Dept: CARDIOLOGY | Facility: HOSPITAL | Age: 75
Discharge: HOME | End: 2020-05-27
Admitting: INTERNAL MEDICINE

## 2020-05-27 VITALS
SYSTOLIC BLOOD PRESSURE: 150 MMHG | WEIGHT: 250 LBS | RESPIRATION RATE: 20 BRPM | BODY MASS INDEX: 35 KG/M2 | HEIGHT: 71 IN | DIASTOLIC BLOOD PRESSURE: 83 MMHG | HEART RATE: 65 BPM

## 2020-05-27 DIAGNOSIS — I50.31 ACUTE DIASTOLIC CHF (CONGESTIVE HEART FAILURE) (HCC): ICD-10-CM

## 2020-05-27 LAB
BH CV ECHO MEAS - ACS: 2 CM
BH CV ECHO MEAS - AO MAX PG (FULL): 2.8 MMHG
BH CV ECHO MEAS - AO MAX PG: 6.7 MMHG
BH CV ECHO MEAS - AO MEAN PG (FULL): 1.8 MMHG
BH CV ECHO MEAS - AO MEAN PG: 3.8 MMHG
BH CV ECHO MEAS - AO ROOT AREA (BSA CORRECTED): 1.6
BH CV ECHO MEAS - AO ROOT AREA: 10.3 CM^2
BH CV ECHO MEAS - AO ROOT DIAM: 3.6 CM
BH CV ECHO MEAS - AO V2 MAX: 129.5 CM/SEC
BH CV ECHO MEAS - AO V2 MEAN: 91.7 CM/SEC
BH CV ECHO MEAS - AO V2 VTI: 27.6 CM
BH CV ECHO MEAS - ASC AORTA: 3.8 CM
BH CV ECHO MEAS - AVA(I,A): 3 CM^2
BH CV ECHO MEAS - AVA(I,D): 3 CM^2
BH CV ECHO MEAS - AVA(V,A): 2.6 CM^2
BH CV ECHO MEAS - AVA(V,D): 2.6 CM^2
BH CV ECHO MEAS - BSA(HAYCOCK): 2.4 M^2
BH CV ECHO MEAS - BSA: 2.3 M^2
BH CV ECHO MEAS - BZI_BMI: 34.9 KILOGRAMS/M^2
BH CV ECHO MEAS - BZI_METRIC_HEIGHT: 180.3 CM
BH CV ECHO MEAS - BZI_METRIC_WEIGHT: 113.4 KG
BH CV ECHO MEAS - CI(CUBED): 1.1 L/MIN/M^2
BH CV ECHO MEAS - CI(MOD-SP2): 0.72 L/MIN/M^2
BH CV ECHO MEAS - CI(MOD-SP4): 0.96 L/MIN/M^2
BH CV ECHO MEAS - CI(TEICH): 0.93 L/MIN/M^2
BH CV ECHO MEAS - CO(CUBED): 2.5 L/MIN
BH CV ECHO MEAS - CO(MOD-SP2): 1.7 L/MIN
BH CV ECHO MEAS - CO(MOD-SP4): 2.2 L/MIN
BH CV ECHO MEAS - CO(TEICH): 2.2 L/MIN
BH CV ECHO MEAS - EDV(CUBED): 93.4 ML
BH CV ECHO MEAS - EDV(MOD-SP2): 62.8 ML
BH CV ECHO MEAS - EDV(MOD-SP4): 80.1 ML
BH CV ECHO MEAS - EDV(TEICH): 94.2 ML
BH CV ECHO MEAS - EF(CUBED): 53.8 %
BH CV ECHO MEAS - EF(MOD-BP): 55 %
BH CV ECHO MEAS - EF(MOD-SP2): 52.8 %
BH CV ECHO MEAS - EF(MOD-SP4): 55.5 %
BH CV ECHO MEAS - EF(TEICH): 45.7 %
BH CV ECHO MEAS - ESV(CUBED): 43.1 ML
BH CV ECHO MEAS - ESV(MOD-SP2): 29.6 ML
BH CV ECHO MEAS - ESV(MOD-SP4): 35.7 ML
BH CV ECHO MEAS - ESV(TEICH): 51.1 ML
BH CV ECHO MEAS - FS: 22.7 %
BH CV ECHO MEAS - IVS/LVPW: 1
BH CV ECHO MEAS - IVSD: 1.7 CM
BH CV ECHO MEAS - LA DIMENSION(2D): 4 CM
BH CV ECHO MEAS - LV DIASTOLIC VOL/BSA (35-75): 34.5 ML/M^2
BH CV ECHO MEAS - LV MASS(C)D: 341.7 GRAMS
BH CV ECHO MEAS - LV MASS(C)DI: 147.4 GRAMS/M^2
BH CV ECHO MEAS - LV MAX PG: 3.9 MMHG
BH CV ECHO MEAS - LV MEAN PG: 1.9 MMHG
BH CV ECHO MEAS - LV SYSTOLIC VOL/BSA (12-30): 15.4 ML/M^2
BH CV ECHO MEAS - LV V1 MAX: 99 CM/SEC
BH CV ECHO MEAS - LV V1 MEAN: 64.3 CM/SEC
BH CV ECHO MEAS - LV V1 VTI: 24.5 CM
BH CV ECHO MEAS - LVIDD: 4.5 CM
BH CV ECHO MEAS - LVIDS: 3.5 CM
BH CV ECHO MEAS - LVOT AREA: 3.4 CM^2
BH CV ECHO MEAS - LVOT DIAM: 2.1 CM
BH CV ECHO MEAS - LVPWD: 1.7 CM
BH CV ECHO MEAS - MM HR: 50.1 BPM
BH CV ECHO MEAS - MM R-R INT: 1.2 SEC
BH CV ECHO MEAS - MV A MAX VEL: 95.8 CM/SEC
BH CV ECHO MEAS - MV DEC SLOPE: 269.1 CM/SEC^2
BH CV ECHO MEAS - MV DEC TIME: 0.34 SEC
BH CV ECHO MEAS - MV E MAX VEL: 90.6 CM/SEC
BH CV ECHO MEAS - MV E/A: 0.95
BH CV ECHO MEAS - MV MAX PG: 3.8 MMHG
BH CV ECHO MEAS - MV MEAN PG: 1.2 MMHG
BH CV ECHO MEAS - MV V2 MAX: 96.9 CM/SEC
BH CV ECHO MEAS - MV V2 MEAN: 50.1 CM/SEC
BH CV ECHO MEAS - MV V2 VTI: 28.4 CM
BH CV ECHO MEAS - MVA(VTI): 2.9 CM^2
BH CV ECHO MEAS - PA ACC TIME: 0.15 SEC
BH CV ECHO MEAS - PA MAX PG (FULL): 2.6 MMHG
BH CV ECHO MEAS - PA MAX PG: 4 MMHG
BH CV ECHO MEAS - PA MEAN PG (FULL): 1.4 MMHG
BH CV ECHO MEAS - PA MEAN PG: 2.2 MMHG
BH CV ECHO MEAS - PA PR(ACCEL): 12.6 MMHG
BH CV ECHO MEAS - PA V2 MAX: 100.4 CM/SEC
BH CV ECHO MEAS - PA V2 MEAN: 70.6 CM/SEC
BH CV ECHO MEAS - PA V2 VTI: 22.1 CM
BH CV ECHO MEAS - PVA(I,A): 2.4 CM^2
BH CV ECHO MEAS - PVA(I,D): 2.4 CM^2
BH CV ECHO MEAS - PVA(V,A): 2.3 CM^2
BH CV ECHO MEAS - PVA(V,D): 2.3 CM^2
BH CV ECHO MEAS - QP/QS: 0.64
BH CV ECHO MEAS - RV MAX PG: 1.4 MMHG
BH CV ECHO MEAS - RV MEAN PG: 0.82 MMHG
BH CV ECHO MEAS - RV V1 MAX: 59.2 CM/SEC
BH CV ECHO MEAS - RV V1 MEAN: 42.9 CM/SEC
BH CV ECHO MEAS - RV V1 VTI: 13.8 CM
BH CV ECHO MEAS - RVAW: 3.6 CM
BH CV ECHO MEAS - RVOT AREA: 3.9 CM^2
BH CV ECHO MEAS - RVOT DIAM: 2.2 CM
BH CV ECHO MEAS - SI(AO): 123 ML/M^2
BH CV ECHO MEAS - SI(CUBED): 21.7 ML/M^2
BH CV ECHO MEAS - SI(LVOT): 36 ML/M^2
BH CV ECHO MEAS - SI(MOD-SP2): 14.3 ML/M^2
BH CV ECHO MEAS - SI(MOD-SP4): 19.2 ML/M^2
BH CV ECHO MEAS - SI(TEICH): 18.6 ML/M^2
BH CV ECHO MEAS - SV(AO): 285.3 ML
BH CV ECHO MEAS - SV(CUBED): 50.2 ML
BH CV ECHO MEAS - SV(LVOT): 83.6 ML
BH CV ECHO MEAS - SV(MOD-SP2): 33.2 ML
BH CV ECHO MEAS - SV(MOD-SP4): 44.4 ML
BH CV ECHO MEAS - SV(RVOT): 53.8 ML
BH CV ECHO MEAS - SV(TEICH): 43.1 ML

## 2020-05-27 PROCEDURE — 25010000002 SULFUR HEXAFLUORIDE MICROSPH 60.7-25 MG RECONSTITUTED SUSPENSION: Performed by: INTERNAL MEDICINE

## 2020-05-27 PROCEDURE — 93306 TTE W/DOPPLER COMPLETE: CPT

## 2020-05-27 PROCEDURE — 93306 TTE W/DOPPLER COMPLETE: CPT | Performed by: INTERNAL MEDICINE

## 2020-05-27 RX ADMIN — SULFUR HEXAFLUORIDE 2 ML: KIT at 11:30

## 2020-06-10 ENCOUNTER — OFFICE VISIT (OUTPATIENT)
Dept: CARDIOLOGY | Facility: CLINIC | Age: 75
End: 2020-06-10

## 2020-06-10 ENCOUNTER — LAB (OUTPATIENT)
Dept: LAB | Facility: HOSPITAL | Age: 75
End: 2020-06-10

## 2020-06-10 VITALS
RESPIRATION RATE: 18 BRPM | WEIGHT: 241.4 LBS | DIASTOLIC BLOOD PRESSURE: 74 MMHG | HEIGHT: 71 IN | HEART RATE: 85 BPM | SYSTOLIC BLOOD PRESSURE: 122 MMHG | BODY MASS INDEX: 33.8 KG/M2

## 2020-06-10 DIAGNOSIS — I25.10 CORONARY ARTERY DISEASE DUE TO LIPID RICH PLAQUE: ICD-10-CM

## 2020-06-10 DIAGNOSIS — R53.83 OTHER FATIGUE: ICD-10-CM

## 2020-06-10 DIAGNOSIS — R42 DIZZINESS: ICD-10-CM

## 2020-06-10 DIAGNOSIS — E11.9 TYPE 2 DIABETES MELLITUS WITHOUT COMPLICATION, WITH LONG-TERM CURRENT USE OF INSULIN (HCC): ICD-10-CM

## 2020-06-10 DIAGNOSIS — Z79.4 TYPE 2 DIABETES MELLITUS WITHOUT COMPLICATION, WITH LONG-TERM CURRENT USE OF INSULIN (HCC): ICD-10-CM

## 2020-06-10 DIAGNOSIS — I10 ESSENTIAL HYPERTENSION: ICD-10-CM

## 2020-06-10 DIAGNOSIS — I25.83 CORONARY ARTERY DISEASE DUE TO LIPID RICH PLAQUE: ICD-10-CM

## 2020-06-10 DIAGNOSIS — R42 DIZZINESS: Primary | ICD-10-CM

## 2020-06-10 DIAGNOSIS — I48.91 NEW ONSET ATRIAL FIBRILLATION (HCC): ICD-10-CM

## 2020-06-10 DIAGNOSIS — E78.2 MIXED HYPERLIPIDEMIA: ICD-10-CM

## 2020-06-10 LAB
T3FREE SERPL-MCNC: 2.72 PG/ML (ref 2–4.4)
T4 FREE SERPL-MCNC: 1.47 NG/DL (ref 0.93–1.7)
TSH SERPL DL<=0.05 MIU/L-ACNC: 3.56 UIU/ML (ref 0.27–4.2)

## 2020-06-10 PROCEDURE — 84481 FREE ASSAY (FT-3): CPT

## 2020-06-10 PROCEDURE — 84439 ASSAY OF FREE THYROXINE: CPT

## 2020-06-10 PROCEDURE — 99214 OFFICE O/P EST MOD 30 MIN: CPT | Performed by: INTERNAL MEDICINE

## 2020-06-10 PROCEDURE — 84443 ASSAY THYROID STIM HORMONE: CPT

## 2020-06-10 PROCEDURE — 36415 COLL VENOUS BLD VENIPUNCTURE: CPT

## 2020-06-10 NOTE — PROGRESS NOTES
Subjective:     Encounter Date:06/10/2020      Patient ID: Carroll Rodgers is a 74 y.o. male.    Chief Complaint:  Chief Complaint   Patient presents with   • Follow-up       HPI:  Carroll is a very pleasant 74-year-old patient initially self-referred.  He is previously seen cardiologist in [facility].   He has known coronary artery disease and is status post coronary artery bypass grafting in 1996 with a LIMA to the LAD and SVG to the RCA and circumflex artery.  He subsequently had total occlusion of his SVG graft to the obtuse marginal branch and underwent stenting with a drug-eluting stent 7/1/2015 as well as his SVG graft to the right coronary artery.  Subsequently he developed exertional and rest angina with his worst symptom being exertional dyspnea.  Cardiac catheterization performed 1/30/2018 showed critical stenosis of the jailed segment of the inferior branch of the circumflex obtuse marginal branch with what appeared to be 70% in-stent restenosis of the superior parent limb of the circumflex into the superior limb.  He was told that medical therapy was his best option.  Since then he has had worsening angina on Ranexa and worsening dyspnea.    He came for second opinion.    I personally reviewed the cardiac catheterization images from 1/30/2018 and confirmed the above results.  In addition he has a patent LIMA to the LAD with a distal apical 80% narrowing that is inconsequential.  He has a chronic total occlusion of his SVG graft to the his right coronary artery and again an occlusion of his SVG to the OM.  The circumflex OM native vessel is as described above.  His ECG last visit in the office showed normal sinus rhythm with APCs and a old left bundle branch block.      His chest pain syndrome was that of exertional angina often if not always associated with dyspnea with chronic stable angina class III symptoms versus unstable angina given the increase in frequency.  He does not have associated  nausea or diaphoresis.    He underwent PCI and stenting of the circumflex artery 9/9/2019 with an excellent angiographic result with restoration of MONIE-3 flow.  Since then he had done well.    However he comes in today complaining of exertional dyspnea with one episode of acute onset chest pain that lasts approximately 1 minute similar to his previous angina pectoris.  Performed an ECG in the office today which showed atrial fibrillation rate controlled at 78 bpm which is a new finding with poor R wave progression.  He has not had any chest pain for the past week.  Of note he did start his Isordil back.  He has been taking his dual antiplatelet therapy with very little interruption per his wife.  Nitroglycerin appeared to relieve his initial pain described above.    He was recently diagnosed with new onset paroxysmal atrial fibrillation.  I personally reviewed his echo from 8/23/2019 which showed normal LV systolic function with diastolic grade 1A impaired relaxation normal RV function with no significant valvular heart disease.  He was loaded with amiodarone which chemically converted him to normal sinus rhythm.  He felt a little bit better since then.  However he still significantly short of breath with intermittent chest pain  meds somewhat atypical.  He had pulmonary function tests and thyroid function test sent.  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  He was therefore taken off amiodarone started on levothyroxine and maintained on metoprolol and Xarelto for anticoagulation.    Today returns still with some shortness of breath and chest tightness with extreme exertion.  I personally reviewed his echocardiogram from 2/28/2020 which shows normal LV systolic function with ejection fraction of 55 to 60% and underlying grade 1A diastolic dysfunction with no significant valvular heart disease.    He still has not had his pulmonary function test  performed.    The following portions of the patient's history were reviewed and updated as appropriate: allergies, current medications, past family history, past medical history, past social history, past surgical history and problem list.    Problem List:  Patient Active Problem List   Diagnosis   • Type 2 diabetes mellitus without complication, with long-term current use of insulin (CMS/[facility])   • Mixed hyperlipidemia   • Essential hypertension   • Acquired hypothyroidism   • Coronary artery disease due to lipid rich plaque   • Unstable angina (CMS/[facility])   • CAD S/P percutaneous coronary angioplasty   • New onset atrial fibrillation (CMS/[facility])       Past Medical History:  Past Medical History:   Diagnosis Date   • Coronary artery disease    • Diabetes mellitus (CMS/[facility])    • Hyperlipidemia    • Hypertension    • Hypothyroidism    • New onset atrial fibrillation (CMS/[facility]) 1/11/2020   • Stroke (CMS/HCC)     X2   • Unstable angina (CMS/HCC) 8/22/2019       Past Surgical History:  Past Surgical History:   Procedure Laterality Date   • CARDIAC CATHETERIZATION  01/30/2018    EF 40%, Distal LM 50% stenosis, Proximal LM 50% stenosis, Proximal LAD 95% stenosis, Mid % stenosis, Apical LAD 70% stenosis, Significant ISR at bifurcation, 90% stenosis at circumflex portoin, 90% stenosis at ostial OM, distal circumflex 80% stenosis, Mid % stenosis, SVG to % occluded at ostium, SVG to OM branches 100% occluded, LIMA to LAD has no significant disease   • CARDIAC CATHETERIZATION N/A 9/9/2019    PCI to OM1 and OM2 of LCX using overlapping Xience LARISA' and Pato LARISA. PCI to LM into distal LM and ostial proximal circ using Xience LARISA. Laser atherectomy.   • CARDIAC CATHETERIZATION N/A 9/9/2019    Procedure: Atherectomy-coronary;  Surgeon: Gonzalez Ochoa MD;  Location:  INVASIVE LOCATION;  Service: Cardiology   • CORONARY ARTERY BYPASS GRAFT  1996    X3   • CORONARY STENT PLACEMENT  07/01/2015     "Xience [place]pine LARISA to RCA (X2) and Mid Circ (X1)-Dr. Rodriguez   • CORONARY STENT PLACEMENT  2006   • CORONARY STENT PLACEMENT  2010       Social History:  Social History     Socioeconomic History   • Marital status:      Spouse name: Not on file   • Number of children: Not on file   • Years of education: Not on file   • Highest education level: Not on file   Tobacco Use   • Smoking status: Former Smoker   • Smokeless tobacco: Never Used   • Tobacco comment: 40 years ago   Substance and Sexual Activity   • Alcohol use: Yes   • Drug use: No   • Sexual activity: Defer       Allergies:  Allergies   Allergen Reactions   • Peanut-Containing Drug Products          Review of Symptoms:  Constitutional: Patient afebrile no chills or unexpected weight changes  Respiratory: No cough, no wheezing or dyspnea  Cardiovascular: Today he has no chest pain, palpitations, dyspnea, orthopnea and no edema  Gastrointestinal: No nausea, vomiting, constipation or diarrhea.  No melena or dark stools    All other systems reviewed and are negative             Objective:         /74 (BP Location: Left arm, Patient Position: Sitting)   Pulse 85   Resp 18   Ht 180.3 cm (71\")   Wt 109 kg (241 lb 6.4 oz)   BMI 33.67 kg/m²     Physical exam  Constitutional: well-nourished, and appears stated age in no acute distress  PERRL: Conjunctiva clear, no pallor, anicteric  HENMT: normocephalic, normal dentition, no cyanosis or pallor  Neck:no bruits, or thrills and bilateral normal carotid upstroke. Normal jugular venous pressure  Cardiovascular: No parasternal heaves an non-displaced focal PMI. Normal rate and rhythm: no rub, gallop, murmur or click and normal S1 and S2; no lower or upper extremity edema.   Lungs: unlabored, no wheezing with no rales or rhonchi on auscultation.  Extremities: Warm, no clubbing, cyanosis. Full and equal peripheral pulses in extremities with no bruits appreciated.   Abdomen: soft, non-tender, " non-distended  Musculoskeletal: no joint tenderness or swelling and no erythema  Skin: Warm and dry, non-erythematous   Neuro:alert and normal affect. Oriented to time, place and person.       In-Office Procedure(s):  Procedures    ASCVD RIsk Score::  The ASCVD Risk score (Falunmerrick RODRIGUEZ Jr., et al., 2013) failed to calculate for the following reasons:    Cannot find a previous HDL lab    Cannot find a previous total cholesterol lab    Recent Radiology:  Imaging Results (Most Recent)     None          Lab Review:   Hospital Outpatient Visit on 05/27/2020   Component Date Value   • BSA 05/27/2020 2.3    • RVAW 05/27/2020 3.6    • IVSd 05/27/2020 1.7    • LVIDd 05/27/2020 4.5    • LVIDs 05/27/2020 3.5    • LVPWd 05/27/2020 1.7    • IVS/LVPW 05/27/2020 1.0    • FS 05/27/2020 22.7    • EDV(Teich) 05/27/2020 94.2    • ESV(Teich) 05/27/2020 51.1    • EF(Teich) 05/27/2020 45.7    • EDV(cubed) 05/27/2020 93.4    • ESV(cubed) 05/27/2020 43.1    • EF(cubed) 05/27/2020 53.8    • LV mass(C)d 05/27/2020 341.7    • LV mass(C)dI 05/27/2020 147.4    • CO(Teich) 05/27/2020 2.2    • CI(Teich) 05/27/2020 0.93    • SV(Teich) 05/27/2020 43.1    • SI(Teich) 05/27/2020 18.6    • CO(cubed) 05/27/2020 2.5    • CI(cubed) 05/27/2020 1.1    • SV(cubed) 05/27/2020 50.2    • SI(cubed) 05/27/2020 21.7    • Ao root diam 05/27/2020 3.6    • Ao root area 05/27/2020 10.3    • ACS 05/27/2020 2.0    • asc Aorta Diam 05/27/2020 3.8    • LVOT diam 05/27/2020 2.1    • LVOT area 05/27/2020 3.4    • RVOT diam 05/27/2020 2.2    • RVOT area 05/27/2020 3.9    • EDV(MOD-sp4) 05/27/2020 80.1    • ESV(MOD-sp4) 05/27/2020 35.7    • EF(MOD-sp4) 05/27/2020 55.5    • EDV(MOD-sp2) 05/27/2020 62.8    • ESV(MOD-sp2) 05/27/2020 29.6    • EF(MOD-sp2) 05/27/2020 52.8    • CO(MOD-sp4) 05/27/2020 2.2    • CI(MOD-sp4) 05/27/2020 0.96    • SV(MOD-sp4) 05/27/2020 44.4    • SI(MOD-sp4) 05/27/2020 19.2    • CO(MOD-sp2) 05/27/2020 1.7    • CI(MOD-sp2) 05/27/2020 0.72    • SV(MOD-sp2)  05/27/2020 33.2    • SI(MOD-sp2) 05/27/2020 14.3    • Ao root area (BSA correc* 05/27/2020 1.6    • LV Dsouza Vol (BSA correct* 05/27/2020 34.5    • LV Sys Vol (BSA correcte* 05/27/2020 15.4    • BH CV ECHO BREONNA - MM R-R* 05/27/2020 1.2    • BH CV ECHO BREONNA - MM HR 05/27/2020 50.1    • MV E max alber 05/27/2020 90.6    • MV A max alber 05/27/2020 95.8    • MV E/A 05/27/2020 0.95    • MV V2 max 05/27/2020 96.9    • MV max PG 05/27/2020 3.8    • MV V2 mean 05/27/2020 50.1    • MV mean PG 05/27/2020 1.2    • MV V2 VTI 05/27/2020 28.4    • MVA(VTI) 05/27/2020 2.9    • MV dec slope 05/27/2020 269.1    • MV dec time 05/27/2020 0.34    • Ao pk alber 05/27/2020 129.5    • Ao max PG 05/27/2020 6.7    • Ao max PG (full) 05/27/2020 2.8    • Ao V2 mean 05/27/2020 91.7    • Ao mean PG 05/27/2020 3.8    • Ao mean PG (full) 05/27/2020 1.8    • Ao V2 VTI 05/27/2020 27.6    • TERRIE(I,A) 05/27/2020 3.0    • TERRIE(I,D) 05/27/2020 3.0    • TERRIE(V,A) 05/27/2020 2.6    • TERRIE(V,D) 05/27/2020 2.6    • LV V1 max PG 05/27/2020 3.9    • LV V1 mean PG 05/27/2020 1.9    • LV V1 max 05/27/2020 99.0    • LV V1 mean 05/27/2020 64.3    • LV V1 VTI 05/27/2020 24.5    • SV(Ao) 05/27/2020 285.3    • SI(Ao) 05/27/2020 123.0    • SV(LVOT) 05/27/2020 83.6    • SV(RVOT) 05/27/2020 53.8    • SI(LVOT) 05/27/2020 36.0    • PA V2 max 05/27/2020 100.4    • PA max PG 05/27/2020 4.0    • PA max PG (full) 05/27/2020 2.6    • PA V2 mean 05/27/2020 70.6    • PA mean PG 05/27/2020 2.2    • PA mean PG (full) 05/27/2020 1.4    • PA V2 VTI 05/27/2020 22.1    • PVA(I,A) 05/27/2020 2.4    • BH CV ECHO BREONNA - PVA(I,* 05/27/2020 2.4    • BH CV ECHO BREONNA - PVA(V,* 05/27/2020 2.3    • BH CV ECHO BREONNA - PVA(V,* 05/27/2020 2.3    • PA acc time 05/27/2020 0.15    • RV V1 max PG 05/27/2020 1.4    • RV V1 mean PG 05/27/2020 0.82    • RV V1 max 05/27/2020 59.2    • RV V1 mean 05/27/2020 42.9    • RV V1 VTI 05/27/2020 13.8    • PA pr(Accel) 05/27/2020 12.6    • Qp/Qs 05/27/2020 0.64    • BH CV  ECHO BREONNA - BZI_BMI 05/27/2020 34.9    • BH CV ECHO BREONNA - BSA(HA* 05/27/2020 2.4    • BH CV ECHO BREONNA - BZI_ME* 05/27/2020 113.4    • BH CV ECHO BREONNA - BZI_ME* 05/27/2020 180.3    • EF(MOD-bp) 05/27/2020 55.0    • LA dimension(2D) 05/27/2020 4.0               Invalid input(s): ALKPO4                        Invalid input(s): LDLCALC                Assessment:          Diagnosis Plan   1. Dizziness  TSH    T3, Free    T4, Free   2. Other fatigue  TSH    T3, Free    T4, Free   3. New onset atrial fibrillation (CMS/HCC)     4. Mixed hyperlipidemia     5. Essential hypertension     6. Coronary artery disease due to lipid rich plaque     7. Type 2 diabetes mellitus without complication, with long-term current use of insulin (CMS/HCC)            Plan:         1. Dizziness  May be thyroid related  - TSH; Future  - T3, Free; Future  - T4, Free; Future    2. Other fatigue  Uncertain of current etiology  - TSH; Future  - T3, Free; Future  - T4, Free; Future    3. New onset atrial fibrillation (CMS/HCC)  On anticoagulation with normal sinus rhythm today    4. Mixed hyperlipidemia  Well-controlled on medical therapy    5. Essential hypertension  Well-controlled on medical therapy    6. Coronary artery disease due to lipid rich plaque  Appears to be overall clinically silent.  He does have chest pain that may be due to pulmonary disease versus small vessel disease.  If this persists we may have to go back to cardiac catheterization lab and evaluate invasively.    7. Type 2 diabetes mellitus without complication, with long-term current use of insulin (CMS/HCC)  Stable      Level of Care:                 Gonzalez Ochoa MD  06/10/20  .

## 2020-06-29 ENCOUNTER — APPOINTMENT (OUTPATIENT)
Dept: RESPIRATORY THERAPY | Facility: HOSPITAL | Age: 75
End: 2020-06-29

## 2020-06-29 ENCOUNTER — TELEPHONE (OUTPATIENT)
Dept: CARDIOLOGY | Facility: CLINIC | Age: 75
End: 2020-06-29

## 2020-07-24 ENCOUNTER — LAB (OUTPATIENT)
Dept: LAB | Facility: HOSPITAL | Age: 75
End: 2020-07-24

## 2020-07-24 DIAGNOSIS — Z98.61 CAD S/P PERCUTANEOUS CORONARY ANGIOPLASTY: ICD-10-CM

## 2020-07-24 DIAGNOSIS — I25.10 CAD S/P PERCUTANEOUS CORONARY ANGIOPLASTY: ICD-10-CM

## 2020-07-24 LAB
ANION GAP SERPL CALCULATED.3IONS-SCNC: 14.6 MMOL/L (ref 5–15)
BUN SERPL-MCNC: 16 MG/DL (ref 8–23)
BUN/CREAT SERPL: 13.6 (ref 7–25)
CALCIUM SPEC-SCNC: 9.5 MG/DL (ref 8.6–10.5)
CHLORIDE SERPL-SCNC: 102 MMOL/L (ref 98–107)
CO2 SERPL-SCNC: 20.4 MMOL/L (ref 22–29)
CREAT SERPL-MCNC: 1.18 MG/DL (ref 0.76–1.27)
GFR SERPL CREATININE-BSD FRML MDRD: 60 ML/MIN/1.73
GLUCOSE SERPL-MCNC: 240 MG/DL (ref 65–99)
POTASSIUM SERPL-SCNC: 5.2 MMOL/L (ref 3.5–5.2)
SODIUM SERPL-SCNC: 137 MMOL/L (ref 136–145)

## 2020-07-24 PROCEDURE — 80048 BASIC METABOLIC PNL TOTAL CA: CPT

## 2020-07-24 PROCEDURE — 36415 COLL VENOUS BLD VENIPUNCTURE: CPT

## 2020-07-24 PROCEDURE — U0004 COV-19 TEST NON-CDC HGH THRU: HCPCS

## 2020-07-24 PROCEDURE — U0002 COVID-19 LAB TEST NON-CDC: HCPCS

## 2020-07-24 PROCEDURE — C9803 HOPD COVID-19 SPEC COLLECT: HCPCS

## 2020-07-26 LAB
REF LAB TEST METHOD: NORMAL
SARS-COV-2 RNA RESP QL NAA+PROBE: NOT DETECTED

## 2020-07-27 ENCOUNTER — HOSPITAL ENCOUNTER (OUTPATIENT)
Dept: RESPIRATORY THERAPY | Facility: HOSPITAL | Age: 75
Discharge: HOME | End: 2020-07-27
Admitting: INTERNAL MEDICINE

## 2020-07-27 VITALS
RESPIRATION RATE: 18 BRPM | HEIGHT: 71 IN | OXYGEN SATURATION: 94 % | HEART RATE: 69 BPM | BODY MASS INDEX: 33.99 KG/M2 | WEIGHT: 242.8 LBS

## 2020-07-27 DIAGNOSIS — Z79.899 LONG TERM CURRENT USE OF AMIODARONE: ICD-10-CM

## 2020-07-27 PROCEDURE — 94729 DIFFUSING CAPACITY: CPT

## 2020-07-27 PROCEDURE — 94060 EVALUATION OF WHEEZING: CPT

## 2020-07-27 PROCEDURE — 94726 PLETHYSMOGRAPHY LUNG VOLUMES: CPT

## 2020-07-27 PROCEDURE — A9270 NON-COVERED ITEM OR SERVICE: HCPCS | Performed by: INTERNAL MEDICINE

## 2020-07-27 PROCEDURE — 63710000001 ALBUTEROL SULFATE HFA 108 (90 BASE) MCG/ACT AEROSOL SOLUTION: Performed by: INTERNAL MEDICINE

## 2020-07-27 RX ORDER — ALBUTEROL SULFATE 90 UG/1
2 AEROSOL, METERED RESPIRATORY (INHALATION) ONCE
Status: COMPLETED | OUTPATIENT
Start: 2020-07-27 | End: 2020-07-27

## 2020-07-27 RX ADMIN — ALBUTEROL SULFATE 2 PUFF: 108 AEROSOL, METERED RESPIRATORY (INHALATION) at 14:46

## 2020-08-03 ENCOUNTER — TELEPHONE (OUTPATIENT)
Dept: CARDIOLOGY | Facility: CLINIC | Age: 75
End: 2020-08-03

## 2020-08-03 DIAGNOSIS — R06.09 DYSPNEA ON EXERTION: ICD-10-CM

## 2020-08-03 DIAGNOSIS — I25.10 CORONARY ARTERY DISEASE DUE TO LIPID RICH PLAQUE: ICD-10-CM

## 2020-08-03 DIAGNOSIS — I20.0 UNSTABLE ANGINA (HCC): Primary | ICD-10-CM

## 2020-08-03 DIAGNOSIS — I25.83 CORONARY ARTERY DISEASE DUE TO LIPID RICH PLAQUE: ICD-10-CM

## 2020-08-03 DIAGNOSIS — Z01.818 PRE-OP TESTING: ICD-10-CM

## 2020-08-03 NOTE — TELEPHONE ENCOUNTER
Pt still reports Dyspnea and Exertional CP. Given normal PFT results, Don would like to proceed with [facility] to r/o new blockage. Pt is agreeable. Orders placed. Deb

## 2020-08-07 ENCOUNTER — RESULTS ENCOUNTER (OUTPATIENT)
Dept: CARDIOLOGY | Facility: CLINIC | Age: 75
End: 2020-08-07

## 2020-08-07 DIAGNOSIS — Z01.818 PRE-OP TESTING: ICD-10-CM

## 2020-08-07 DIAGNOSIS — Z01.818 PRE-OP TESTING: Primary | ICD-10-CM

## 2020-08-07 PROBLEM — R06.09 DYSPNEA ON EXERTION: Status: ACTIVE | Noted: 2020-08-07

## 2020-08-11 ENCOUNTER — LAB (OUTPATIENT)
Dept: LAB | Facility: HOSPITAL | Age: 75
End: 2020-08-11

## 2020-08-11 DIAGNOSIS — R06.09 DYSPNEA ON EXERTION: ICD-10-CM

## 2020-08-11 DIAGNOSIS — I25.83 CORONARY ARTERY DISEASE DUE TO LIPID RICH PLAQUE: ICD-10-CM

## 2020-08-11 DIAGNOSIS — I20.0 UNSTABLE ANGINA (HCC): ICD-10-CM

## 2020-08-11 DIAGNOSIS — Z01.818 PRE-OP TESTING: ICD-10-CM

## 2020-08-11 DIAGNOSIS — I25.10 CORONARY ARTERY DISEASE DUE TO LIPID RICH PLAQUE: ICD-10-CM

## 2020-08-11 LAB
ALBUMIN SERPL-MCNC: 4.2 G/DL (ref 3.5–5.2)
ALBUMIN/GLOB SERPL: 1.4 G/DL
ALP SERPL-CCNC: 142 U/L (ref 39–117)
ALT SERPL W P-5'-P-CCNC: 18 U/L (ref 1–41)
ANION GAP SERPL CALCULATED.3IONS-SCNC: 8.8 MMOL/L (ref 5–15)
AST SERPL-CCNC: 16 U/L (ref 1–40)
BASOPHILS # BLD AUTO: 0.04 10*3/MM3 (ref 0–0.2)
BASOPHILS NFR BLD AUTO: 0.8 % (ref 0–1.5)
BILIRUB SERPL-MCNC: 0.3 MG/DL (ref 0–1.2)
BUN SERPL-MCNC: 21 MG/DL (ref 8–23)
BUN/CREAT SERPL: 16.2 (ref 7–25)
CALCIUM SPEC-SCNC: 9.6 MG/DL (ref 8.6–10.5)
CHLORIDE SERPL-SCNC: 104 MMOL/L (ref 98–107)
CO2 SERPL-SCNC: 25.2 MMOL/L (ref 22–29)
CREAT SERPL-MCNC: 1.3 MG/DL (ref 0.76–1.27)
DEPRECATED RDW RBC AUTO: 45.5 FL (ref 37–54)
EOSINOPHIL # BLD AUTO: 0.22 10*3/MM3 (ref 0–0.4)
EOSINOPHIL NFR BLD AUTO: 4.2 % (ref 0.3–6.2)
ERYTHROCYTE [DISTWIDTH] IN BLOOD BY AUTOMATED COUNT: 14 % (ref 12.3–15.4)
GFR SERPL CREATININE-BSD FRML MDRD: 54 ML/MIN/1.73
GLOBULIN UR ELPH-MCNC: 3.1 GM/DL
GLUCOSE SERPL-MCNC: 190 MG/DL (ref 65–99)
HCT VFR BLD AUTO: 42.9 % (ref 37.5–51)
HGB BLD-MCNC: 13.9 G/DL (ref 13–17.7)
IMM GRANULOCYTES # BLD AUTO: 0 10*3/MM3 (ref 0–0.05)
IMM GRANULOCYTES NFR BLD AUTO: 0 % (ref 0–0.5)
INR PPP: 1.29 (ref 0.93–1.1)
LYMPHOCYTES # BLD AUTO: 1.44 10*3/MM3 (ref 0.7–3.1)
LYMPHOCYTES NFR BLD AUTO: 27.2 % (ref 19.6–45.3)
MCH RBC QN AUTO: 28.7 PG (ref 26.6–33)
MCHC RBC AUTO-ENTMCNC: 32.4 G/DL (ref 31.5–35.7)
MCV RBC AUTO: 88.6 FL (ref 79–97)
MONOCYTES # BLD AUTO: 0.71 10*3/MM3 (ref 0.1–0.9)
MONOCYTES NFR BLD AUTO: 13.4 % (ref 5–12)
NEUTROPHILS NFR BLD AUTO: 2.88 10*3/MM3 (ref 1.7–7)
NEUTROPHILS NFR BLD AUTO: 54.4 % (ref 42.7–76)
NRBC BLD AUTO-RTO: 0 /100 WBC (ref 0–0.2)
PLATELET # BLD AUTO: 170 10*3/MM3 (ref 140–450)
PMV BLD AUTO: 11.4 FL (ref 6–12)
POTASSIUM SERPL-SCNC: 5 MMOL/L (ref 3.5–5.2)
PROT SERPL-MCNC: 7.3 G/DL (ref 6–8.5)
PROTHROMBIN TIME: 13.9 SECONDS (ref 9.6–11.7)
RBC # BLD AUTO: 4.84 10*6/MM3 (ref 4.14–5.8)
SODIUM SERPL-SCNC: 138 MMOL/L (ref 136–145)
WBC # BLD AUTO: 5.29 10*3/MM3 (ref 3.4–10.8)

## 2020-08-11 PROCEDURE — U0002 COVID-19 LAB TEST NON-CDC: HCPCS

## 2020-08-11 PROCEDURE — U0004 COV-19 TEST NON-CDC HGH THRU: HCPCS

## 2020-08-11 PROCEDURE — 80053 COMPREHEN METABOLIC PANEL: CPT

## 2020-08-11 PROCEDURE — 36415 COLL VENOUS BLD VENIPUNCTURE: CPT

## 2020-08-11 PROCEDURE — C9803 HOPD COVID-19 SPEC COLLECT: HCPCS

## 2020-08-11 PROCEDURE — 85610 PROTHROMBIN TIME: CPT

## 2020-08-11 PROCEDURE — 85025 COMPLETE CBC W/AUTO DIFF WBC: CPT

## 2020-08-12 LAB
REF LAB TEST METHOD: NORMAL
SARS-COV-2 RNA RESP QL NAA+PROBE: NOT DETECTED

## 2020-08-13 ENCOUNTER — HOSPITAL ENCOUNTER (OUTPATIENT)
Facility: HOSPITAL | Age: 75
Discharge: HOME | End: 2020-08-14
Attending: INTERNAL MEDICINE | Admitting: INTERNAL MEDICINE

## 2020-08-13 DIAGNOSIS — N17.9 AKI (ACUTE KIDNEY INJURY) (HCC): Primary | ICD-10-CM

## 2020-08-13 DIAGNOSIS — R06.09 DYSPNEA ON EXERTION: ICD-10-CM

## 2020-08-13 DIAGNOSIS — I20.0 UNSTABLE ANGINA (HCC): ICD-10-CM

## 2020-08-13 DIAGNOSIS — I25.10 CORONARY ARTERY DISEASE DUE TO LIPID RICH PLAQUE: ICD-10-CM

## 2020-08-13 DIAGNOSIS — I25.83 CORONARY ARTERY DISEASE DUE TO LIPID RICH PLAQUE: ICD-10-CM

## 2020-08-13 LAB
GLUCOSE BLDC GLUCOMTR-MCNC: 135 MG/DL (ref 70–105)
GLUCOSE BLDC GLUCOMTR-MCNC: 157 MG/DL (ref 70–105)
GLUCOSE BLDC GLUCOMTR-MCNC: 173 MG/DL (ref 70–105)
GLUCOSE BLDC GLUCOMTR-MCNC: 289 MG/DL (ref 70–105)

## 2020-08-13 PROCEDURE — C1874 STENT, COATED/COV W/DEL SYS: HCPCS | Performed by: INTERNAL MEDICINE

## 2020-08-13 PROCEDURE — C1887 CATHETER, GUIDING: HCPCS | Performed by: INTERNAL MEDICINE

## 2020-08-13 PROCEDURE — 0 IOPAMIDOL PER 1 ML: Performed by: INTERNAL MEDICINE

## 2020-08-13 PROCEDURE — 63710000001 INSULIN ISOPHANE HUMAN PER 5 UNITS: Performed by: INTERNAL MEDICINE

## 2020-08-13 PROCEDURE — 63710000001 CHOLECALCIFEROL 25 MCG (1000 UT) TABLET: Performed by: INTERNAL MEDICINE

## 2020-08-13 PROCEDURE — 63710000001 INSULIN LISPRO (HUMAN) PER 5 UNITS: Performed by: INTERNAL MEDICINE

## 2020-08-13 PROCEDURE — A9270 NON-COVERED ITEM OR SERVICE: HCPCS | Performed by: INTERNAL MEDICINE

## 2020-08-13 PROCEDURE — C1894 INTRO/SHEATH, NON-LASER: HCPCS | Performed by: INTERNAL MEDICINE

## 2020-08-13 PROCEDURE — 85347 COAGULATION TIME ACTIVATED: CPT

## 2020-08-13 PROCEDURE — 99153 MOD SED SAME PHYS/QHP EA: CPT | Performed by: INTERNAL MEDICINE

## 2020-08-13 PROCEDURE — 25010000002 FENTANYL CITRATE (PF) 100 MCG/2ML SOLUTION: Performed by: INTERNAL MEDICINE

## 2020-08-13 PROCEDURE — C9600 PERC DRUG-EL COR STENT SING: HCPCS | Performed by: INTERNAL MEDICINE

## 2020-08-13 PROCEDURE — C1725 CATH, TRANSLUMIN NON-LASER: HCPCS | Performed by: INTERNAL MEDICINE

## 2020-08-13 PROCEDURE — C1769 GUIDE WIRE: HCPCS | Performed by: INTERNAL MEDICINE

## 2020-08-13 PROCEDURE — C1760 CLOSURE DEV, VASC: HCPCS | Performed by: INTERNAL MEDICINE

## 2020-08-13 PROCEDURE — 63710000001 ATORVASTATIN 40 MG TABLET: Performed by: INTERNAL MEDICINE

## 2020-08-13 PROCEDURE — 25010000002 HEPARIN (PORCINE) PER 1000 UNITS: Performed by: INTERNAL MEDICINE

## 2020-08-13 PROCEDURE — 92928 PRQ TCAT PLMT NTRAC ST 1 LES: CPT | Performed by: INTERNAL MEDICINE

## 2020-08-13 PROCEDURE — 93459 L HRT ART/GRFT ANGIO: CPT | Performed by: INTERNAL MEDICINE

## 2020-08-13 PROCEDURE — G0378 HOSPITAL OBSERVATION PER HR: HCPCS

## 2020-08-13 PROCEDURE — 25010000002 MIDAZOLAM PER 1 MG: Performed by: INTERNAL MEDICINE

## 2020-08-13 PROCEDURE — 82962 GLUCOSE BLOOD TEST: CPT

## 2020-08-13 PROCEDURE — 99152 MOD SED SAME PHYS/QHP 5/>YRS: CPT | Performed by: INTERNAL MEDICINE

## 2020-08-13 DEVICE — XIENCE SIERRA™ EVEROLIMUS ELUTING CORONARY STENT SYSTEM 2.50 MM X 12 MM / RAPID-EXCHANGE
Type: IMPLANTABLE DEVICE | Site: CORONARY | Status: FUNCTIONAL
Brand: XIENCE SIERRA™

## 2020-08-13 DEVICE — XIENCE SIERRA™ EVEROLIMUS ELUTING CORONARY STENT SYSTEM 3.00 MM X 23 MM / RAPID-EXCHANGE
Type: IMPLANTABLE DEVICE | Site: CORONARY | Status: FUNCTIONAL
Brand: XIENCE SIERRA™

## 2020-08-13 RX ORDER — LEVOTHYROXINE SODIUM 0.12 MG/1
125 TABLET ORAL
Status: DISCONTINUED | OUTPATIENT
Start: 2020-08-14 | End: 2020-08-14 | Stop reason: HOSPADM

## 2020-08-13 RX ORDER — SODIUM CHLORIDE 9 MG/ML
INJECTION, SOLUTION INTRAVENOUS CONTINUOUS PRN
Status: COMPLETED | OUTPATIENT
Start: 2020-08-13 | End: 2020-08-13

## 2020-08-13 RX ORDER — LIDOCAINE HYDROCHLORIDE 20 MG/ML
INJECTION, SOLUTION INFILTRATION; PERINEURAL AS NEEDED
Status: DISCONTINUED | OUTPATIENT
Start: 2020-08-13 | End: 2020-08-13 | Stop reason: HOSPADM

## 2020-08-13 RX ORDER — HEPARIN SODIUM 1000 [USP'U]/ML
INJECTION, SOLUTION INTRAVENOUS; SUBCUTANEOUS AS NEEDED
Status: DISCONTINUED | OUTPATIENT
Start: 2020-08-13 | End: 2020-08-13 | Stop reason: HOSPADM

## 2020-08-13 RX ORDER — SODIUM CHLORIDE 9 MG/ML
100 INJECTION, SOLUTION INTRAVENOUS CONTINUOUS
Status: DISCONTINUED | OUTPATIENT
Start: 2020-08-13 | End: 2020-08-13 | Stop reason: SDUPTHER

## 2020-08-13 RX ORDER — CLOPIDOGREL BISULFATE 75 MG/1
75 TABLET ORAL DAILY
Status: DISCONTINUED | OUTPATIENT
Start: 2020-08-14 | End: 2020-08-14 | Stop reason: HOSPADM

## 2020-08-13 RX ORDER — METOPROLOL SUCCINATE 25 MG/1
25 TABLET, EXTENDED RELEASE ORAL DAILY
Status: DISCONTINUED | OUTPATIENT
Start: 2020-08-14 | End: 2020-08-14 | Stop reason: HOSPADM

## 2020-08-13 RX ORDER — AMLODIPINE BESYLATE 5 MG/1
10 TABLET ORAL DAILY
Status: DISCONTINUED | OUTPATIENT
Start: 2020-08-14 | End: 2020-08-14 | Stop reason: HOSPADM

## 2020-08-13 RX ORDER — LANOLIN ALCOHOL/MO/W.PET/CERES
1000 CREAM (GRAM) TOPICAL DAILY
Status: DISCONTINUED | OUTPATIENT
Start: 2020-08-14 | End: 2020-08-14 | Stop reason: HOSPADM

## 2020-08-13 RX ORDER — ACETAMINOPHEN 325 MG/1
650 TABLET ORAL EVERY 4 HOURS PRN
Status: DISCONTINUED | OUTPATIENT
Start: 2020-08-13 | End: 2020-08-14 | Stop reason: HOSPADM

## 2020-08-13 RX ORDER — CLOPIDOGREL BISULFATE 75 MG/1
TABLET ORAL AS NEEDED
Status: DISCONTINUED | OUTPATIENT
Start: 2020-08-13 | End: 2020-08-13 | Stop reason: HOSPADM

## 2020-08-13 RX ORDER — MELATONIN
2000 NIGHTLY
Status: DISCONTINUED | OUTPATIENT
Start: 2020-08-13 | End: 2020-08-14 | Stop reason: HOSPADM

## 2020-08-13 RX ORDER — MIDAZOLAM HYDROCHLORIDE 1 MG/ML
INJECTION INTRAMUSCULAR; INTRAVENOUS AS NEEDED
Status: DISCONTINUED | OUTPATIENT
Start: 2020-08-13 | End: 2020-08-13 | Stop reason: HOSPADM

## 2020-08-13 RX ORDER — SODIUM CHLORIDE 9 MG/ML
30 INJECTION, SOLUTION INTRAVENOUS CONTINUOUS
Status: DISCONTINUED | OUTPATIENT
Start: 2020-08-13 | End: 2020-08-14 | Stop reason: HOSPADM

## 2020-08-13 RX ORDER — SODIUM CHLORIDE 9 MG/ML
100 INJECTION, SOLUTION INTRAVENOUS CONTINUOUS
Status: DISCONTINUED | OUTPATIENT
Start: 2020-08-13 | End: 2020-08-14 | Stop reason: HOSPADM

## 2020-08-13 RX ORDER — ATORVASTATIN CALCIUM 40 MG/1
40 TABLET, FILM COATED ORAL NIGHTLY
Status: DISCONTINUED | OUTPATIENT
Start: 2020-08-13 | End: 2020-08-14 | Stop reason: HOSPADM

## 2020-08-13 RX ORDER — FENTANYL CITRATE 50 UG/ML
INJECTION, SOLUTION INTRAMUSCULAR; INTRAVENOUS AS NEEDED
Status: DISCONTINUED | OUTPATIENT
Start: 2020-08-13 | End: 2020-08-13 | Stop reason: HOSPADM

## 2020-08-13 RX ORDER — NITROGLYCERIN 5 MG/ML
INJECTION, SOLUTION INTRAVENOUS AS NEEDED
Status: DISCONTINUED | OUTPATIENT
Start: 2020-08-13 | End: 2020-08-13 | Stop reason: HOSPADM

## 2020-08-13 RX ORDER — GLIPIZIDE 5 MG/1
5 TABLET ORAL
Status: DISCONTINUED | OUTPATIENT
Start: 2020-08-14 | End: 2020-08-14 | Stop reason: HOSPADM

## 2020-08-13 RX ORDER — ASPIRIN 325 MG
TABLET ORAL AS NEEDED
Status: DISCONTINUED | OUTPATIENT
Start: 2020-08-13 | End: 2020-08-13 | Stop reason: HOSPADM

## 2020-08-13 RX ADMIN — ATORVASTATIN CALCIUM 40 MG: 40 TABLET, FILM COATED ORAL at 19:26

## 2020-08-13 RX ADMIN — INSULIN LISPRO 15 UNITS: 100 INJECTION, SOLUTION INTRAVENOUS; SUBCUTANEOUS at 19:26

## 2020-08-13 RX ADMIN — Medication 2000 UNITS: at 19:26

## 2020-08-13 RX ADMIN — INSULIN HUMAN 25 UNITS: 100 INJECTION, SUSPENSION SUBCUTANEOUS at 21:07

## 2020-08-13 RX ADMIN — SODIUM CHLORIDE 100 ML/HR: 900 INJECTION, SOLUTION INTRAVENOUS at 12:15

## 2020-08-13 NOTE — H&P
Subjective     Patient ID: Carroll Rodgers is a 74 y.o. male.     Chief Complaint:      Chief Complaint   Patient presents with   • Follow-up         HPI:  Carroll is a very pleasant 74-year-old patient initially self-referred.  He is previously seen cardiologist in [facility].   He has known coronary artery disease and is status post coronary artery bypass grafting in 1996 with a LIMA to the LAD and SVG to the RCA and circumflex artery.  He subsequently had total occlusion of his SVG graft to the obtuse marginal branch and underwent stenting with a drug-eluting stent 7/1/2015 as well as his SVG graft to the right coronary artery.  Subsequently he developed exertional and rest angina with his worst symptom being exertional dyspnea.  Cardiac catheterization performed 1/30/2018 showed critical stenosis of the jailed segment of the inferior branch of the circumflex obtuse marginal branch with what appeared to be 70% in-stent restenosis of the superior parent limb of the circumflex into the superior limb.  He was told that medical therapy was his best option.  Since then he has had worsening angina on Ranexa and worsening dyspnea.    He came for second opinion.     I personally reviewed the cardiac catheterization images from 1/30/2018 and confirmed the above results.  In addition he has a patent LIMA to the LAD with a distal apical 80% narrowing that is inconsequential.  He has a chronic total occlusion of his SVG graft to the his right coronary artery and again an occlusion of his SVG to the OM.  The circumflex OM native vessel is as described above.  His ECG last visit in the office showed normal sinus rhythm with APCs and a old left bundle branch block.      His chest pain syndrome was that of exertional angina often if not always associated with dyspnea with chronic stable angina class III symptoms versus unstable angina given the increase in frequency.  He does not have associated nausea or diaphoresis.     He  underwent PCI and stenting of the circumflex artery 9/9/2019 with an excellent angiographic result with restoration of MONIE-3 flow.  Since then he had done well.     However he comes in today complaining of exertional dyspnea with one episode of acute onset chest pain that lasts approximately 1 minute similar to his previous angina pectoris.  Performed an ECG in the office today which showed atrial fibrillation rate controlled at 78 bpm which is a new finding with poor R wave progression.  He has not had any chest pain for the past week.  Of note he did start his Isordil back.  He has been taking his dual antiplatelet therapy with very little interruption per his wife.  Nitroglycerin appeared to relieve his initial pain described above.     He was recently diagnosed with new onset paroxysmal atrial fibrillation.  I personally reviewed his echo from 8/23/2019 which showed normal LV systolic function with diastolic grade 1A impaired relaxation normal RV function with no significant valvular heart disease.  He was loaded with amiodarone which chemically converted him to normal sinus rhythm.  He felt a little bit better since then.  However he still significantly short of breath with intermittent chest pain  meds somewhat atypical.  He had pulmonary function tests and thyroid function test sent.  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  He was therefore taken off amiodarone started on levothyroxine and maintained on metoprolol and Xarelto for anticoagulation.     Today returns still with some shortness of breath and chest tightness with extreme exertion.  I personally reviewed his echocardiogram from 2/28/2020 which shows normal LV systolic function with ejection fraction of 55 to 60% and underlying grade 1A diastolic dysfunction with no significant valvular heart disease.     He still has not had his pulmonary function test performed.     The following  portions of the patient's history were reviewed and updated as appropriate: allergies, current medications, past family history, past medical history, past social history, past surgical history and problem list.     Problem List:      Patient Active Problem List   Diagnosis   • Type 2 diabetes mellitus without complication, with long-term current use of insulin (CMS/[facility])   • Mixed hyperlipidemia   • Essential hypertension   • Acquired hypothyroidism   • Coronary artery disease due to lipid rich plaque   • Unstable angina (CMS/[facility])   • CAD S/P percutaneous coronary angioplasty   • New onset atrial fibrillation (CMS/[facility])         Past Medical History:  Medical History        Past Medical History:   Diagnosis Date   • Coronary artery disease     • Diabetes mellitus (CMS/[facility])     • Hyperlipidemia     • Hypertension     • Hypothyroidism     • New onset atrial fibrillation (CMS/[facility]) 1/11/2020   • Stroke (CMS/[facility])       X2   • Unstable angina (CMS/[facility]) 8/22/2019            Past Surgical History:  Surgical History         Past Surgical History:   Procedure Laterality Date   • CARDIAC CATHETERIZATION   01/30/2018     EF 40%, Distal LM 50% stenosis, Proximal LM 50% stenosis, Proximal LAD 95% stenosis, Mid % stenosis, Apical LAD 70% stenosis, Significant ISR at bifurcation, 90% stenosis at circumflex portoin, 90% stenosis at ostial OM, distal circumflex 80% stenosis, Mid % stenosis, SVG to % occluded at ostium, SVG to OM branches 100% occluded, LIMA to LAD has no significant disease   • CARDIAC CATHETERIZATION N/A 9/9/2019     PCI to OM1 and OM2 of LCX using overlapping Xience LARISA' and [place] LARISA. PCI to LM into distal LM and ostial proximal circ using Xience LARISA. Laser atherectomy.   • CARDIAC CATHETERIZATION N/A 9/9/2019     Procedure: Atherectomy-coronary;  Surgeon: Gonzalez Ochoa MD;  Location: [facility] CATH INVASIVE LOCATION;  Service: Cardiology   • CORONARY ARTERY BYPASS GRAFT   1996     X3  "  • CORONARY STENT PLACEMENT   07/01/2015     Xience [place] LARISA to RCA (X2) and Mid Circ (X1)-Dr. Rodriguez   • CORONARY STENT PLACEMENT   2006   • CORONARY STENT PLACEMENT   2010            Social History:  Social History   Social History            Socioeconomic History   • Marital status:        Spouse name: Not on file   • Number of children: Not on file   • Years of education: Not on file   • Highest education level: Not on file   Tobacco Use   • Smoking status: Former Smoker   • Smokeless tobacco: Never Used   • Tobacco comment: 40 years ago   Substance and Sexual Activity   • Alcohol use: Yes   • Drug use: No   • Sexual activity: Defer            Allergies:       Allergies   Allergen Reactions   • Peanut-Containing Drug Products              Review of Symptoms:  Constitutional: Patient afebrile no chills or unexpected weight changes  Respiratory: No cough, no wheezing or dyspnea  Cardiovascular: Today he has no chest pain, palpitations, dyspnea, orthopnea and no edema  Gastrointestinal: No nausea, vomiting, constipation or diarrhea.  No melena or dark stools     All other systems reviewed and are negative                 Objective:      Objective          /74 (BP Location: Left arm, Patient Position: Sitting)   Pulse 85   Resp 18   Ht 180.3 cm (71\")   Wt 109 kg (241 lb 6.4 oz)   BMI 33.67 kg/m²      Physical exam  Constitutional: well-nourished, and appears stated age in no acute distress  PERRL: Conjunctiva clear, no pallor, anicteric  HENMT: normocephalic, normal dentition, no cyanosis or pallor  Neck:no bruits, or thrills and bilateral normal carotid upstroke. Normal jugular venous pressure  Cardiovascular: No parasternal heaves an non-displaced focal PMI. Normal rate and rhythm: no rub, gallop, murmur or click and normal S1 and S2; no lower or upper extremity edema.   Lungs: unlabored, no wheezing with no rales or rhonchi on auscultation.  Extremities: Warm, no clubbing, cyanosis. Full " and equal peripheral pulses in extremities with no bruits appreciated.   Abdomen: soft, non-tender, non-distended  Musculoskeletal: no joint tenderness or swelling and no erythema  Skin: Warm and dry, non-erythematous   Neuro:alert and normal affect. Oriented to time, place and person.         In-Office Procedure(s):  Procedures     ASCVD RIsk Score::  The ASCVD Risk score (Alecmerrick RODRIGUEZ Jr., et al., 2013) failed to calculate for the following reasons:    Cannot find a previous HDL lab    Cannot find a previous total cholesterol lab     Recent Radiology:      Imaging Results (Most Recent)      None             Lab Review:         Hospital Outpatient Visit on 05/27/2020   Component Date Value   • BSA 05/27/2020 2.3    • RVAW 05/27/2020 3.6    • IVSd 05/27/2020 1.7    • LVIDd 05/27/2020 4.5    • LVIDs 05/27/2020 3.5    • LVPWd 05/27/2020 1.7    • IVS/LVPW 05/27/2020 1.0    • FS 05/27/2020 22.7    • EDV(Teich) 05/27/2020 94.2    • ESV(Teich) 05/27/2020 51.1    • EF(Teich) 05/27/2020 45.7    • EDV(cubed) 05/27/2020 93.4    • ESV(cubed) 05/27/2020 43.1    • EF(cubed) 05/27/2020 53.8    • LV mass(C)d 05/27/2020 341.7    • LV mass(C)dI 05/27/2020 147.4    • CO(Teich) 05/27/2020 2.2    • CI(Teich) 05/27/2020 0.93    • SV(Teich) 05/27/2020 43.1    • SI(Teich) 05/27/2020 18.6    • CO(cubed) 05/27/2020 2.5    • CI(cubed) 05/27/2020 1.1    • SV(cubed) 05/27/2020 50.2    • SI(cubed) 05/27/2020 21.7    • Ao root diam 05/27/2020 3.6    • Ao root area 05/27/2020 10.3    • ACS 05/27/2020 2.0    • asc Aorta Diam 05/27/2020 3.8    • LVOT diam 05/27/2020 2.1    • LVOT area 05/27/2020 3.4    • RVOT diam 05/27/2020 2.2    • RVOT area 05/27/2020 3.9    • EDV(MOD-sp4) 05/27/2020 80.1    • ESV(MOD-sp4) 05/27/2020 35.7    • EF(MOD-sp4) 05/27/2020 55.5    • EDV(MOD-sp2) 05/27/2020 62.8    • ESV(MOD-sp2) 05/27/2020 29.6    • EF(MOD-sp2) 05/27/2020 52.8    • CO(MOD-sp4) 05/27/2020 2.2    • CI(MOD-sp4) 05/27/2020 0.96    • SV(MOD-sp4) 05/27/2020 44.4     • SI(MOD-sp4) 05/27/2020 19.2    • CO(MOD-sp2) 05/27/2020 1.7    • CI(MOD-sp2) 05/27/2020 0.72    • SV(MOD-sp2) 05/27/2020 33.2    • SI(MOD-sp2) 05/27/2020 14.3    • Ao root area (BSA correc* 05/27/2020 1.6    • LV Dsouza Vol (BSA correct* 05/27/2020 34.5    • LV Sys Vol (BSA correcte* 05/27/2020 15.4    • BH CV ECHO BREONNA - MM R-R* 05/27/2020 1.2    • BH CV ECHO BREONNA - MM HR 05/27/2020 50.1    • MV E max alber 05/27/2020 90.6    • MV A max alber 05/27/2020 95.8    • MV E/A 05/27/2020 0.95    • MV V2 max 05/27/2020 96.9    • MV max PG 05/27/2020 3.8    • MV V2 mean 05/27/2020 50.1    • MV mean PG 05/27/2020 1.2    • MV V2 VTI 05/27/2020 28.4    • MVA(VTI) 05/27/2020 2.9    • MV dec slope 05/27/2020 269.1    • MV dec time 05/27/2020 0.34    • Ao pk alber 05/27/2020 129.5    • Ao max PG 05/27/2020 6.7    • Ao max PG (full) 05/27/2020 2.8    • Ao V2 mean 05/27/2020 91.7    • Ao mean PG 05/27/2020 3.8    • Ao mean PG (full) 05/27/2020 1.8    • Ao V2 VTI 05/27/2020 27.6    • TERRIE(I,A) 05/27/2020 3.0    • TERRIE(I,D) 05/27/2020 3.0    • TERRIE(V,A) 05/27/2020 2.6    • TERRIE(V,D) 05/27/2020 2.6    • LV V1 max PG 05/27/2020 3.9    • LV V1 mean PG 05/27/2020 1.9    • LV V1 max 05/27/2020 99.0    • LV V1 mean 05/27/2020 64.3    • LV V1 VTI 05/27/2020 24.5    • SV(Ao) 05/27/2020 285.3    • SI(Ao) 05/27/2020 123.0    • SV(LVOT) 05/27/2020 83.6    • SV(RVOT) 05/27/2020 53.8    • SI(LVOT) 05/27/2020 36.0    • PA V2 max 05/27/2020 100.4    • PA max PG 05/27/2020 4.0    • PA max PG (full) 05/27/2020 2.6    • PA V2 mean 05/27/2020 70.6    • PA mean PG 05/27/2020 2.2    • PA mean PG (full) 05/27/2020 1.4    • PA V2 VTI 05/27/2020 22.1    • PVA(I,A) 05/27/2020 2.4    • BH CV ECHO BREONNA - PVA(I,* 05/27/2020 2.4    • BH CV ECHO BREONNA - PVA(V,* 05/27/2020 2.3    • BH CV ECHO BREONNA - PVA(V,* 05/27/2020 2.3    • PA acc time 05/27/2020 0.15    • RV V1 max PG 05/27/2020 1.4    • RV V1 mean PG 05/27/2020 0.82    • RV V1 max 05/27/2020 59.2    • RV V1 mean  05/27/2020 42.9    • RV V1 VTI 05/27/2020 13.8    • PA pr(Accel) 05/27/2020 12.6    • Qp/Qs 05/27/2020 0.64    • BH CV ECHO BREONNA - BZI_BMI 05/27/2020 34.9    • BH CV ECHO BREONNA - BSA(HA* 05/27/2020 2.4    • BH CV ECHO BREONNA - BZI_ME* 05/27/2020 113.4    •  CV ECHO BREONNA - BZI_ME* 05/27/2020 180.3    • EF(MOD-bp) 05/27/2020 55.0    • LA dimension(2D) 05/27/2020 4.0                 Invalid input(s): ALKPO4                         Invalid input(s): LDLCALC                   Assessment:      Assessment            Diagnosis Plan   1. Dizziness  TSH     T3, Free     T4, Free   2. Other fatigue  TSH     T3, Free     T4, Free   3. New onset atrial fibrillation (CMS/HCC)      4. Mixed hyperlipidemia      5. Essential hypertension      6. Coronary artery disease due to lipid rich plaque      7. Type 2 diabetes mellitus without complication, with long-term current use of insulin (CMS/HCC)               Plan:      Plan          1. Dizziness  May be thyroid related  - TSH; Future  - T3, Free; Future  - T4, Free; Future     2. Other fatigue  Uncertain of current etiology  - TSH; Future  - T3, Free; Future  - T4, Free; Future     3. New onset atrial fibrillation (CMS/HCC)  On anticoagulation with normal sinus rhythm today     4. Mixed hyperlipidemia  Well-controlled on medical therapy     5. Essential hypertension  Well-controlled on medical therapy     6. Coronary artery disease due to lipid rich plaque  Appears to be overall clinically silent.  He does have chest pain that may be due to pulmonary disease versus small vessel disease.  If this persists we may have to go back to cardiac catheterization lab and evaluate invasively.     7. Type 2 diabetes mellitus without complication, with long-term current use of insulin (CMS/HCC)  Stable     Interim update  Patient continues have chest pain.  Cardiac catheterization for unstable angina today.

## 2020-08-14 VITALS
SYSTOLIC BLOOD PRESSURE: 110 MMHG | WEIGHT: 239.2 LBS | HEIGHT: 69 IN | TEMPERATURE: 97.8 F | HEART RATE: 73 BPM | DIASTOLIC BLOOD PRESSURE: 44 MMHG | OXYGEN SATURATION: 96 % | BODY MASS INDEX: 35.43 KG/M2 | RESPIRATION RATE: 16 BRPM

## 2020-08-14 LAB
ACT BLD: 241 SECONDS (ref 89–137)
ACT BLD: 263 SECONDS (ref 89–137)
ACT BLD: 318 SECONDS (ref 89–137)
ANION GAP SERPL CALCULATED.3IONS-SCNC: 10 MMOL/L (ref 5–15)
BUN SERPL-MCNC: 26 MG/DL (ref 8–23)
BUN SERPL-MCNC: ABNORMAL MG/DL
BUN/CREAT SERPL: ABNORMAL
CALCIUM SPEC-SCNC: 8.8 MG/DL (ref 8.6–10.5)
CHLORIDE SERPL-SCNC: 102 MMOL/L (ref 98–107)
CO2 SERPL-SCNC: 23 MMOL/L (ref 22–29)
CREAT SERPL-MCNC: 1.46 MG/DL (ref 0.76–1.27)
GFR SERPL CREATININE-BSD FRML MDRD: 47 ML/MIN/1.73
GLUCOSE BLDC GLUCOMTR-MCNC: 174 MG/DL (ref 70–105)
GLUCOSE SERPL-MCNC: 302 MG/DL (ref 65–99)
POTASSIUM SERPL-SCNC: 4.5 MMOL/L (ref 3.5–5.2)
SODIUM SERPL-SCNC: 135 MMOL/L (ref 136–145)

## 2020-08-14 PROCEDURE — 80048 BASIC METABOLIC PNL TOTAL CA: CPT | Performed by: NURSE PRACTITIONER

## 2020-08-14 PROCEDURE — A9270 NON-COVERED ITEM OR SERVICE: HCPCS | Performed by: INTERNAL MEDICINE

## 2020-08-14 PROCEDURE — 63710000001 GLIPIZIDE 5 MG TABLET: Performed by: INTERNAL MEDICINE

## 2020-08-14 PROCEDURE — 63710000001 AMLODIPINE 5 MG TABLET: Performed by: INTERNAL MEDICINE

## 2020-08-14 PROCEDURE — 63710000001 LEVOTHYROXINE 125 MCG TABLET: Performed by: INTERNAL MEDICINE

## 2020-08-14 PROCEDURE — 82962 GLUCOSE BLOOD TEST: CPT

## 2020-08-14 PROCEDURE — 99217 PR OBSERVATION CARE DISCHARGE MANAGEMENT: CPT | Performed by: INTERNAL MEDICINE

## 2020-08-14 PROCEDURE — 63710000001 METOPROLOL SUCCINATE XL 25 MG TABLET SUSTAINED-RELEASE 24 HOUR: Performed by: INTERNAL MEDICINE

## 2020-08-14 PROCEDURE — 63710000001 VITAMIN B-12 1000 MCG TABLET: Performed by: INTERNAL MEDICINE

## 2020-08-14 PROCEDURE — A9270 NON-COVERED ITEM OR SERVICE: HCPCS | Performed by: NURSE PRACTITIONER

## 2020-08-14 PROCEDURE — 63710000001 INSULIN ISOPHANE HUMAN PER 5 UNITS: Performed by: INTERNAL MEDICINE

## 2020-08-14 PROCEDURE — 63710000001 CLOPIDOGREL 75 MG TABLET: Performed by: INTERNAL MEDICINE

## 2020-08-14 PROCEDURE — 63710000001 INSULIN LISPRO (HUMAN) PER 5 UNITS: Performed by: INTERNAL MEDICINE

## 2020-08-14 PROCEDURE — G0378 HOSPITAL OBSERVATION PER HR: HCPCS

## 2020-08-14 PROCEDURE — 63710000001 ASPIRIN 81 MG CHEWABLE TABLET: Performed by: NURSE PRACTITIONER

## 2020-08-14 RX ORDER — ASPIRIN 81 MG/1
81 TABLET, CHEWABLE ORAL DAILY
Qty: 30 TABLET | Refills: 6 | Status: SHIPPED | OUTPATIENT
Start: 2020-08-14 | End: 2021-09-17

## 2020-08-14 RX ORDER — ASPIRIN 81 MG/1
81 TABLET, CHEWABLE ORAL DAILY
Status: DISCONTINUED | OUTPATIENT
Start: 2020-08-14 | End: 2020-08-14 | Stop reason: HOSPADM

## 2020-08-14 RX ADMIN — AMLODIPINE BESYLATE 10 MG: 5 TABLET ORAL at 09:52

## 2020-08-14 RX ADMIN — GLIPIZIDE 5 MG: 5 TABLET ORAL at 07:18

## 2020-08-14 RX ADMIN — CYANOCOBALAMIN TAB 1000 MCG 1000 MCG: 1000 TAB at 09:52

## 2020-08-14 RX ADMIN — INSULIN HUMAN 25 UNITS: 100 INJECTION, SUSPENSION SUBCUTANEOUS at 07:30

## 2020-08-14 RX ADMIN — METOPROLOL SUCCINATE 25 MG: 25 TABLET, EXTENDED RELEASE ORAL at 09:51

## 2020-08-14 RX ADMIN — ASPIRIN 81 MG 81 MG: 81 TABLET ORAL at 09:51

## 2020-08-14 RX ADMIN — CLOPIDOGREL BISULFATE 75 MG: 75 TABLET ORAL at 09:51

## 2020-08-14 RX ADMIN — INSULIN LISPRO 15 UNITS: 100 INJECTION, SOLUTION INTRAVENOUS; SUBCUTANEOUS at 07:30

## 2020-08-14 RX ADMIN — LEVOTHYROXINE SODIUM 125 MCG: 125 TABLET ORAL at 06:24

## 2020-08-14 NOTE — DISCHARGE SUMMARY
[facility]  Nacho Al MD, PhD  DISCHARGE SUMMARY      Patient Name: Carroll Rodgers  :1945  75 y.o.    Date of Admit: 2020  Date of Discharge:  2020    Discharge Diagnosis:  Problem List Items Addressed This Visit        Cardiovascular and Mediastinum    Unstable angina (CMS/HCC)    Relevant Orders    Cardiac Catheterization/Vascular Study (Completed)    RESOLVED: Coronary artery disease due to lipid rich plaque    Relevant Orders    Cardiac Catheterization/Vascular Study (Completed)       Respiratory    RESOLVED: Dyspnea on exertion    Relevant Orders    Cardiac Catheterization/Vascular Study (Completed)        Seen and examined, no acute events overnight.  Dynamically electrically stable.  Dual antiplatelet therapy on board in addition to Xarelto, stop aspirin after 1 month, cath site stable, no contraindications to discharge.  Follow-up as outpatient with cardiology in 2 to 4 weeks    1. CAD  - s/p prior CABG  - s/p C : Successful PCI and stenting of the culprit in-stent restenotic segment in the circumflex artery obtuse marginal branch #1 and in the left main coronary artery.    - Patient will be maintained on ASA / Plavix and he is also on Xarelto.   After 1 mo can stop ASA and continue Plavix and Xarelto    2. Paroxysmal afib  - on xarelto    3. KIMBERLY on CKD  - crt 1.46, recommend BMP in 1 week    4. HTN    5. hLD    6. Chronic diastolic HF    Hospital Course:   Patient was admitted after PCI.  He underwent LHC which revealed normal left heart filling pressures with preserved LV systolic function, normal epicardial anatomy with severe three-vessel coronary artery disease.  Widely patent LIMA to the LAD with a distal lesion that is unchanged from previous catheterization; totally occluded SVG graft to obtuse marginal branch and right coronary artery with chronic total occlusion of right coronary artery and severe in-stent restenotic disease in the circumflex artery  as described below.  Successful PCI and stenting of the culprit in-stent restenotic segment in the circumflex artery obtuse marginal branch #1 and in the left main coronary artery.  Patient will be maintained on ASA / Plavix and he is also on Xarelto.  After 1 month ASA can be discontinued and patient can continue Plavix and Xarelto.  Patient should have follow up  BMP in 1 week at PCP office.  Groin site stable, he is stable for discharge.     Patient has been educated on importance of medication compliance and follow up.    Procedures Performed  Procedure(s):  Left Heart Cath  Percutaneous Coronary Intervention    Indication for procedure:     1.  Unstable angina  2.  Known coronary artery disease status post coronary artery bypass grafting with LIMA to the LAD SVG to obtuse marginal branch and SVG to right coronary artery.  He has a known total occlusion of the SVG to the obtuse marginal and SVG to right coronary artery.  He has a known chronic total occlusion of the right coronary artery  3.  Status post multiple stents placed to the circumflex artery with in-stent restenosis recurrence.     Procedures performed:     1.  Native coronary arteriography  2.  Left Heart catheterization  3.  Left ventriculography  4.  Percutaneous coronary event involving drug-eluting stent placement overlapping to the obtuse marginal branch and into the ostial proximal circumflex arteries  5.  Percutaneous coronary intervention involving drug-eluting stent placement to the left main coronary artery.     Description of procedure:     Patient had the risks and benefits of the procedure explained to them in detail after which informed consent was obtained.  Patient was then brought to the cardiac catheterization lab and placed on the table in supine position.  Next the right groin was prepped and draped in sterile fashion.  Next using modified Seldinger technique and a 21-gauge micro puncture needle, the femoral artery was cannulated  without difficulty and a 6 Slovenian was inserted and flushed with heparinized saline.  Next using diagnostic 6 Slovenian JR4 and JL4 catheters, each advanced over an 0.035 guidewire to the level the aortic root and then used to selectively engage their respective coronary ostia, multiple cineangiographic images were obtained all the appropriate projections using hand-injection of nonionic contrast material.  Before removal of the JR4 catheter JR4 catheter was used to selectively intubate the LIMA to the LAD, the SVG graft to the obtuse marginal branch and SVG graft to the right coronary artery.  It was then used to cross the aortic valve into the left ventricle for left heart catheterization left ventriculography.  The catheter was then pulled back and removed.     The culprit was identified is a 99% in-stent restenotic lesion of the distal obtuse marginal branch of the bifurcating circumflex obtuse marginal branch 1 and 2 with ostial proximal stenosis of the circumflex artery of 70% into the distal left main.  Therefore using 6 Slovenian EBU 3.5 guide catheter intubation of the left main was performed followed by wiring into both of the superior limb of the obtuse marginal branch and inferior second obtuse marginal branch distally.  Predilatation was then performed using 2.0 mm trek balloon with restoration of MONIE-3 flow from MONIE I flow.  We then escalated balloon sizing up to a 2.5 mm NC trek balloon stenting was then performed of the angioplastied segment using a 2.0 x 26 mm Freedom drug-eluting stent.  However we were not able to pass this equipment due to the stenosis in the proximal ostial circumflex which was heavily calcified.  Therefore predilatation of this segment into the left main was performed using a 3.25 mm NC trek balloon followed by stenting of the segment using a 3.0 x 23 mm Xience drug-eluting stent put nominal pressure and postdilated to greater than 3.5 mm with 3.5 mm NC trek balloon at 20 delia.  This  allowed us to pass the stent into the distal obtuse marginal branch in-stent restenotic segment and deployed at nominal pressure there was still an area of approximately 8 mm that had severe in-stent restenosis that required stenting.  Predilatation was then performed using a 2.0 mm NC trek balloon at high pressure.  When passing the Xience 2.5 x 12 mm drug-eluting stent across this heavily stenotic segment did appear to be somewhat fractured, the stent was stripped off of the balloon.  Therefore we had to reenter the stent lumen using one 5 mm trek balloon and postdilated the stent escalating balloons up to a 3.0 mm NC trek balloon at 24 delia within the stented segment.  We did not get complete expansion as the stent underwent intussusception or accordion affect.  We were able to achieve a lumen diameter of about 2.5 mm.  The guidewires and catheter were then removed.  We achieved a very good angiographic result in the strip stent segment and an excellent angiographic result in the flanking segments in the left main circumflex arteries.  We had restoration of MONIE-3 flow from MONIE I flow.     Patient tolerated the procedure well there were no complications save equipment complication described above which was corrected by serial deployment of the stent as described above.     During the case, conscious sedation monitoring was 90 min.     At the end of the case a 6 Thai pro-glide device was deployed in the right groin for right groin hemostasis     Findings     Left heart catheterization:     1.  Opening aortic pressure 110/49 mmHg the mean pressure of 70 mmHg  2.  Left ventricular end-diastolic pressure and expiration was 8 mmHg  3.  There is no gradient across aortic valve on pullback     Left ventriculography     The overall estimated left jugular ejection fraction was 55%  Native coronary arteriography: Right dominant circulation known     1.  Left main coronary arteries a large-caliber vessel gives rise left  anterior descending left circumflex flex arteries.  There is a distal tapering 50 to 60% lesion into the circumflex artery that severely calcified and otherwise no significant coronary atherosclerotic disease present.  2.  Left anterior descending artery is a large-caliber vessel that gives rise to large caliber diagonal branches and is severely diseased at the midportion with retrograde phasic filling indicating a patent LIMA to this part of the vessel.  Prior to this segment the LAD has diffuse 70 to 80% disease into a large diagonal branch which itself does not have significant cord atherosclerotic disease; this vessel was not revascularized during bypass.  3.  The left circumflex arteries a large-caliber vessel gives rise to large caliber bifurcating obtuse marginal branches.  There is a 99% in-stent restenotic lesion of the distal obtuse marginal branch of the bifurcating circumflex obtuse marginal branch 1 and 2 with ostial proximal stenosis of the circumflex artery of 70% into the distal left main. no significant coronary atherosclerotic disease present.  There are widely patent stents in the mid circumflex artery into the inferior limb of the second obtuse marginal branch.  There are left-to-right collaterals showing the distal right coronary artery to be totally occluded at its bifurcation with diffuse moderate disease thereafter.  4.  The right coronary arteries a large-caliber vessel that is chronically totally occluded.     The LIMA to the LAD is widely patent at its ostium throughout its course and its anastomosis with native vessel at the anastomosis and beyond there is no cord atherosclerotic disease until he reached the apex where there is a 75% focal stenosis that is old and has not caused angina since bypass.     SVG graft obtuse marginal branch is flush occluded     SVG graft to right coronary artery is flush occluded  Conclusions:     1.  Normal left heart filling pressures with preserved LV  systolic function  2.  Normal epicardial anatomy with severe three-vessel coronary artery disease.  Widely patent LIMA to the LAD with a distal lesion that is unchanged from previous catheterization; totally occluded SVG graft to obtuse marginal branch and right coronary artery with chronic total occlusion of right coronary artery and severe in-stent restenotic disease in the circumflex artery as described above.    3.  Successful PCI and stenting of the culprit in-stent restenotic segment in the circumflex artery obtuse marginal branch #1 and in the left main coronary artery.     Recommendations:     Optimize medical therapy for secondary prevention of ischemic heart disease.         Consults     No orders found for last 30 day(s).          Pertinent Test Results:   Results from last 7 days   Lab Units 08/14/20  0912 08/11/20  1027   SODIUM mmol/L 135* 138   POTASSIUM mmol/L 4.5 5.0   CHLORIDE mmol/L 102 104   CO2 mmol/L 23.0 25.2   BUN mg/dL  --  21   CREATININE mg/dL 1.46* 1.30*   CALCIUM mg/dL 8.8 9.6   BILIRUBIN mg/dL  --  0.3   ALK PHOS U/L  --  142*   ALT (SGPT) U/L  --  18   AST (SGOT) U/L  --  16   GLUCOSE mg/dL 302* 190*         @LABRCNT(bnp)@  Results from last 7 days   Lab Units 08/11/20  1027   WBC 10*3/mm3 5.29   HEMOGLOBIN g/dL 13.9   HEMATOCRIT % 42.9   PLATELETS 10*3/mm3 170     Results from last 7 days   Lab Units 08/11/20  1027   INR  1.29*               ECHOCARDIOGRAM:    Results for orders placed during the hospital encounter of 05/27/20   Adult Transthoracic Echo Complete W/ Cont if Necessary Per Protocol    Narrative · Left ventricular wall thickness is consistent with mild-to-moderate   concentric hypertrophy.  · Left ventricular systolic function is normal.  · Mild dilation of the aortic root is present. Mild dilation of the   sinuses of Valsalva is present.  · Mild pulmonic valve regurgitation is present.  · Left ventricular diastolic dysfunction (grade I a) consistent with   impaired  relaxation.  · Mild mitral valve regurgitation is present            Condition on Discharge: stable    Physical Exam:  General Appearance:    Alert, cooperative, in no acute distress               Neck:   supple,  no JVD   Lungs:     Clear to auscultation,respirations regular, even and                  unlabored    Heart:    Regular rhythm and normal rate, normal S1 and S2   Abdomen:     Normal bowel sounds, no masses, no organomegaly, soft        non-tender, non-distended, no guarding, no rebound                tenderness   Extremities:   Moves all extremities well, no edema, no cyanosis, no             Redness groin soft without oozing bruising or hematoma   Pulses:   Pulses palpable and equal bilaterally   Skin:   No bleeding, bruising or rash   Neurologic:   Awake, alert, oriented x3   Exam unchanged    Discharge Medications     Discharge Medications      New Medications      Instructions Start Date   aspirin 81 MG chewable tablet   81 mg, Oral, Daily         Changes to Medications      Instructions Start Date   amLODIPine 10 MG tablet  Commonly known as:  NORVASC  What changed:  how much to take   10 mg, Oral, Daily      metoprolol succinate XL 25 MG 24 hr tablet  Commonly known as:  TOPROL-XL  What changed:  when to take this   25 mg, Oral, Daily         Continue These Medications      Instructions Start Date   atorvastatin 40 MG tablet  Commonly known as:  LIPITOR   40 mg, Oral, Nightly      cholecalciferol 25 MCG (1000 UT) tablet  Commonly known as:  VITAMIN D3   2,000 Units, Oral, Nightly      clopidogrel 75 MG tablet  Commonly known as:  PLAVIX   75 mg, Oral, Daily      glimepiride 2 MG tablet  Commonly known as:  AMARYL   2 mg, Oral, Every Morning Before Breakfast      insulin  UNIT/ML injection  Commonly known as:  humuLIN N,novoLIN N   25 Units, Subcutaneous, 2 Times Daily      levothyroxine 125 MCG tablet  Commonly known as:  SYNTHROID, LEVOTHROID   125 mcg, Oral, Daily       lisinopril-hydrochlorothiazide 20-25 MG per tablet  Commonly known as:  PRINZIDE,ZESTORETIC   1 tablet, Oral, Daily      NOVOLOG SC   15 Units, Subcutaneous, 2 Times Daily      rivaroxaban 20 MG tablet  Commonly known as:  Xarelto   20 mg, Oral, Daily      vitamin B-12 1000 MCG tablet  Commonly known as:  CYANOCOBALAMIN   1,000 mcg, Oral, Daily             Discharge Diet:   Diet Instructions     Diet: Cardiac      Discharge Diet:  Cardiac          Activity at Discharge:   Activity Instructions     Discharge Activity      Rest and relax today  no lifting over 10 lbs x 2 week  no driving for 24 hrs  no tub baths or hot tubs for 1 week  no stairs for 3 days  monitor right groin for s/s of bleeding          Discharge disposition: home    Follow-up Appointments  Future Appointments   Date Time Provider Department Center   9/9/2020  9:45 AM Gonzalez Ochoa MD [facility] None         Test Results Pending at Discharge   Order Current Status    BUN In process        Nacho Al MD, PhD  08/14/20  09:48    Time: > 30 minutes spent on discharge explaining discharge medications, instructions, activity / diet instructions / restrictions, counseling on disease processes, and follow up care / appointments.

## 2020-08-14 NOTE — PROGRESS NOTES
Discharge Planning Assessment  [facility]     Patient Name: Carroll Rodgers  MRN: [id number]  Today's Date: 8/14/2020    Admit Date: 8/13/2020    Discharge Plan     Row Name 08/14/20 0711       Plan    Plan Comments  Due to covid precautions, attempted to contact patient via telephone for discharge planning.  Patient unavailable at this time.  Per chart review and speaking to nurse, aniticpate no discharge needs at this time.  Will continue to follow         Expected Discharge Date and Time     Expected Discharge Date Expected Discharge Time    Aug 14, 2020         Heather Bingham RN    /Utilization Review  [address] [address]    [phone] office  [phone] fax  [email or web address]  [email or web address]    Hospital NPI:   Hospital Tax ID: [id number]

## 2020-08-14 NOTE — PLAN OF CARE
Problem: Patient Care Overview  Goal: Plan of Care Review  Outcome: Outcome(s) achieved  Flowsheets (Taken 8/14/2020 0828)  Plan of Care Reviewed With: patient  Goal: Individualization and Mutuality  Outcome: Outcome(s) achieved  Goal: Discharge Needs Assessment  Outcome: Outcome(s) achieved  Goal: Interprofessional Rounds/Family Conf  Outcome: Outcome(s) achieved     Problem: Pain, Acute (Adult)  Goal: Identify Related Risk Factors and Signs and Symptoms  Outcome: Outcome(s) achieved  Goal: Acceptable Pain Control/Comfort Level  Outcome: Outcome(s) achieved

## 2020-08-17 ENCOUNTER — TELEPHONE (OUTPATIENT)
Dept: CARDIOLOGY | Facility: CLINIC | Age: 75
End: 2020-08-17

## 2020-08-17 DIAGNOSIS — I48.91 NEW ONSET ATRIAL FIBRILLATION (HCC): Primary | ICD-10-CM

## 2020-08-17 DIAGNOSIS — I48.0 AF (PAROXYSMAL ATRIAL FIBRILLATION) (HCC): ICD-10-CM

## 2020-08-17 NOTE — TELEPHONE ENCOUNTER
MPF  Pt called stating he was stented BHE on Friday and has been experiencing nose bleeds off and on over the wknd.     Pt on 81mg ASA, 20mg Xarelto and 75mg Plavix.     CB# [phone]

## 2020-08-17 NOTE — TELEPHONE ENCOUNTER
Per Don, we need to change you to Eliquis 5mg BID from Xarelto 20mg because of frequent the nose bleeds. Stay on ASA and Plavix. [facility] [place] because [facility] pt has to get his rx's. Pt notified and agreeable.    Per Gonzalez at [facility] [place], their doctor, Dr. Mindy Gar (PCP) will have to rewrite the rx. I will fax this note, the rx and cath note to her at fax # [phone].  Deb

## 2020-08-21 ENCOUNTER — LAB (OUTPATIENT)
Dept: LAB | Facility: HOSPITAL | Age: 75
End: 2020-08-21

## 2020-08-21 DIAGNOSIS — N17.9 AKI (ACUTE KIDNEY INJURY) (HCC): ICD-10-CM

## 2020-08-21 LAB
ANION GAP SERPL CALCULATED.3IONS-SCNC: 8.9 MMOL/L (ref 5–15)
BUN SERPL-MCNC: 16 MG/DL (ref 8–23)
BUN/CREAT SERPL: 12.8 (ref 7–25)
CALCIUM SPEC-SCNC: 9.4 MG/DL (ref 8.6–10.5)
CHLORIDE SERPL-SCNC: 103 MMOL/L (ref 98–107)
CO2 SERPL-SCNC: 25.1 MMOL/L (ref 22–29)
CREAT SERPL-MCNC: 1.25 MG/DL (ref 0.76–1.27)
GFR SERPL CREATININE-BSD FRML MDRD: 56 ML/MIN/1.73
GLUCOSE SERPL-MCNC: 229 MG/DL (ref 65–99)
POTASSIUM SERPL-SCNC: 4.9 MMOL/L (ref 3.5–5.2)
SODIUM SERPL-SCNC: 137 MMOL/L (ref 136–145)

## 2020-08-21 PROCEDURE — 80048 BASIC METABOLIC PNL TOTAL CA: CPT

## 2020-08-21 PROCEDURE — 36415 COLL VENOUS BLD VENIPUNCTURE: CPT

## 2020-08-25 DIAGNOSIS — I48.0 AF (PAROXYSMAL ATRIAL FIBRILLATION) (HCC): ICD-10-CM

## 2020-09-09 ENCOUNTER — OFFICE VISIT (OUTPATIENT)
Dept: CARDIOLOGY | Facility: CLINIC | Age: 75
End: 2020-09-09

## 2020-09-09 VITALS
BODY MASS INDEX: 34.07 KG/M2 | HEART RATE: 79 BPM | HEIGHT: 71 IN | WEIGHT: 243.4 LBS | DIASTOLIC BLOOD PRESSURE: 64 MMHG | SYSTOLIC BLOOD PRESSURE: 136 MMHG | RESPIRATION RATE: 18 BRPM

## 2020-09-09 DIAGNOSIS — E78.2 MIXED HYPERLIPIDEMIA: ICD-10-CM

## 2020-09-09 DIAGNOSIS — I10 ESSENTIAL HYPERTENSION: ICD-10-CM

## 2020-09-09 DIAGNOSIS — I48.91 NEW ONSET ATRIAL FIBRILLATION (HCC): ICD-10-CM

## 2020-09-09 DIAGNOSIS — Z98.61 CAD S/P PERCUTANEOUS CORONARY ANGIOPLASTY: Primary | ICD-10-CM

## 2020-09-09 DIAGNOSIS — I25.10 CAD S/P PERCUTANEOUS CORONARY ANGIOPLASTY: Primary | ICD-10-CM

## 2020-09-09 PROCEDURE — 99214 OFFICE O/P EST MOD 30 MIN: CPT | Performed by: INTERNAL MEDICINE

## 2020-09-09 NOTE — PROGRESS NOTES
Subjective:     Encounter Date:09/09/2020      Patient ID: Carroll Rodgers is a 75 y.o. male.    Chief Complaint:  Chief Complaint   Patient presents with   • Follow-up       HPI:  Carroll is a very pleasant 75-year-old patient initially self-referred.  He is previously seen cardiologist in [facility].   He has known coronary artery disease and is status post coronary artery bypass grafting in 1996 with a LIMA to the LAD and SVG to the RCA and circumflex artery.  He subsequently had total occlusion of his SVG graft to the obtuse marginal branch and underwent stenting with a drug-eluting stent 7/1/2015 as well as his SVG graft to the right coronary artery.  Subsequently he developed exertional and rest angina with his worst symptom being exertional dyspnea.  Cardiac catheterization performed 1/30/2018 showed critical stenosis of the jailed segment of the inferior branch of the circumflex obtuse marginal branch with what appeared to be 70% in-stent restenosis of the superior parent limb of the circumflex into the superior limb.  He was told that medical therapy was his best option.  Since then he has had worsening angina on Ranexa and worsening dyspnea.    He came for second opinion.    I personally reviewed the cardiac catheterization images from 1/30/2018 and confirmed the above results.  In addition he has a patent LIMA to the LAD with a distal apical 80% narrowing that is inconsequential.  He has a chronic total occlusion of his SVG graft to the his right coronary artery and again an occlusion of his SVG to the OM.  The circumflex OM native vessel is as described above.  His ECG last visit in the office showed normal sinus rhythm with APCs and a old left bundle branch block.      His chest pain syndrome was that of exertional angina often if not always associated with dyspnea with chronic stable angina class III symptoms versus unstable angina given the increase in frequency.  He does not have associated  nausea or diaphoresis.    He underwent PCI and stenting of the circumflex artery 9/9/2019 with an excellent angiographic result with restoration of MONIE-3 flow.  Since then he had done well.    However he comes in today complaining of exertional dyspnea with one episode of acute onset chest pain that lasts approximately 1 minute similar to his previous angina pectoris.  Performed an ECG in the office today which showed atrial fibrillation rate controlled at 78 bpm which is a new finding with poor R wave progression.  He has not had any chest pain for the past week.  Of note he did start his Isordil back.  He has been taking his dual antiplatelet therapy with very little interruption per his wife.  Nitroglycerin appeared to relieve his initial pain described above.    He was recently diagnosed with new onset paroxysmal atrial fibrillation.  I personally reviewed his echo from 8/23/2019 which showed normal LV systolic function with diastolic grade 1A impaired relaxation normal RV function with no significant valvular heart disease.  He was loaded with amiodarone which chemically converted him to normal sinus rhythm.  He felt a little bit better since then. He had pulmonary function tests and thyroid function test sent.  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  He was therefore taken off amiodarone started on levothyroxine and maintained on metoprolol and Xarelto for anticoagulation.    Last visit he returned with some shortness of breath and chest tightness with extreme exertion.  I personally reviewed his echocardiogram from 2/28/2020 which shows normal LV systolic function with ejection fraction of 55 to 60% and underlying grade 1A diastolic dysfunction with no significant valvular heart disease.  He underwent cardiac catheterization which revealed critical in-stent restenosis of the superior limb of the obtuse marginal branch previously stented.  He  underwent PCI and stenting 8/13/2020.    He returns today with no complaints from a cardiovascular standpoint stating he feels better than ever.    The following portions of the patient's history were reviewed and updated as appropriate: allergies, current medications, past family history, past medical history, past social history, past surgical history and problem list.    Problem List:  Patient Active Problem List   Diagnosis   • Type 2 diabetes mellitus without complication, with long-term current use of insulin (CMS/[facility])   • Mixed hyperlipidemia   • Essential hypertension   • Acquired hypothyroidism   • CAD S/P percutaneous coronary angioplasty   • New onset atrial fibrillation (CMS/[facility])   • Unstable angina (CMS/[facility])       Past Medical History:  Past Medical History:   Diagnosis Date   • Coronary artery disease    • Diabetes mellitus (CMS/[facility])    • Hyperlipidemia    • Hypertension    • Hypothyroidism    • New onset atrial fibrillation (CMS/[facility]) 1/11/2020   • Stroke (CMS/HCC)     X2   • Unstable angina (CMS/[facility]) 8/22/2019       Past Surgical History:  Past Surgical History:   Procedure Laterality Date   • CARDIAC CATHETERIZATION  01/30/2018    EF 40%, Distal LM 50% stenosis, Proximal LM 50% stenosis, Proximal LAD 95% stenosis, Mid % stenosis, Apical LAD 70% stenosis, Significant ISR at bifurcation, 90% stenosis at circumflex portoin, 90% stenosis at ostial OM, distal circumflex 80% stenosis, Mid % stenosis, SVG to % occluded at ostium, SVG to OM branches 100% occluded, LIMA to LAD has no significant disease   • CARDIAC CATHETERIZATION N/A 9/9/2019    PCI to OM1 and OM2 of LCX using overlapping Xience LARISA' and Pato LARISA. PCI to LM into distal LM and ostial proximal circ using Xience LARISA. Laser atherectomy.   • CARDIAC CATHETERIZATION N/A 9/9/2019    Procedure: Atherectomy-coronary;  Surgeon: Gonzalez Ochoa MD;  Location: [facility] CATH INVASIVE LOCATION;  Service: Cardiology   •  "CARDIAC CATHETERIZATION N/A 8/13/2020    Procedure: Left Heart Cath;  Surgeon: Gonzalez Ochoa MD;  Location: [facility] CATH INVASIVE LOCATION;  Service: Cardiology;  Laterality: N/A;   • CARDIAC CATHETERIZATION N/A 8/13/2020    Procedure: Percutaneous Coronary Intervention;  Surgeon: Gonzalez Ochoa MD;  Location: [facility] CATH INVASIVE LOCATION;  Service: Cardiology;  Laterality: N/A;  PCI  Left Main PTCA/LARISA  PCI Circx/OM  PTCA/LARISA   • CORONARY ARTERY BYPASS GRAFT  1996    X3   • CORONARY STENT PLACEMENT  07/01/2015    Xience [place]pine LARISA to RCA (X2) and Mid Circ (X1)-Dr. Rodriguez   • CORONARY STENT PLACEMENT  2006   • CORONARY STENT PLACEMENT  2010       Social History:  Social History     Socioeconomic History   • Marital status:      Spouse name: Not on file   • Number of children: Not on file   • Years of education: Not on file   • Highest education level: Not on file   Tobacco Use   • Smoking status: Former Smoker   • Smokeless tobacco: Never Used   • Tobacco comment: 40 years ago   Substance and Sexual Activity   • Alcohol use: Yes   • Drug use: No   • Sexual activity: Defer       Allergies:  Allergies   Allergen Reactions   • Peanut-Containing Drug Products          Review of Symptoms:  Constitutional: Patient afebrile no chills or unexpected weight changes  Respiratory: No cough, no wheezing or dyspnea  Cardiovascular: Today the patient complains of no chest pain, palpitations, dyspnea, orthopnea and no edema  Gastrointestinal: No nausea, vomiting, constipation or diarrhea.  No melena or dark stools    All other systems reviewed and are negative             Objective:         /64 (BP Location: Left arm, Patient Position: Sitting)   Pulse 79   Resp 18   Ht 180.3 cm (71\")   Wt 110 kg (243 lb 6.4 oz)   BMI 33.95 kg/m²     Physical exam  Constitutional: well-nourished, and appears stated age in no acute distress  PERRL: Conjunctiva clear, no pallor, anicteric  HENMT: " normocephalic, normal dentition, no cyanosis or pallor  Neck:no bruits, or thrills and bilateral normal carotid upstroke. Normal jugular venous pressure  Cardiovascular: No parasternal heaves an non-displaced focal PMI. Normal rate and rhythm: no rub, gallop, murmur or click and normal S1 and S2; no lower or upper extremity edema.   Lungs: unlabored, no wheezing with no rales or rhonchi on auscultation.  Extremities: Warm, no clubbing, cyanosis. Full and equal peripheral pulses in extremities with no bruits appreciated.   Abdomen: soft, non-tender, non-distended  Musculoskeletal: no joint tenderness or swelling and no erythema  Skin: Warm and dry, non-erythematous   Neuro:alert and normal affect. Oriented to time, place and person.           In-Office Procedure(s):  Procedures    ASCVD RIsk Score::  The ASCVD Risk score ([place] RODRIGUEZ Jr., et al., 2013) failed to calculate for the following reasons:    Cannot find a previous HDL lab    Cannot find a previous total cholesterol lab    Recent Radiology:  Imaging Results (Most Recent)     None          Lab Review:   Lab on 08/21/2020   Component Date Value   • Glucose 08/21/2020 229*   • BUN 08/21/2020 16    • Creatinine 08/21/2020 1.25    • Sodium 08/21/2020 137    • Potassium 08/21/2020 4.9    • Chloride 08/21/2020 103    • CO2 08/21/2020 25.1    • Calcium 08/21/2020 9.4    • eGFR Non African Amer 08/21/2020 56*   • BUN/Creatinine Ratio 08/21/2020 12.8    • Anion Gap 08/21/2020 8.9    Admission on 08/13/2020, Discharged on 08/14/2020   Component Date Value   • Glucose 08/13/2020 173*   • Glucose 08/13/2020 135*   • Glucose 08/13/2020 157*   • Glucose 08/13/2020 289*   • Activated Clotting Time  08/13/2020 241*   • Activated Clotting Time  08/13/2020 318*   • Activated Clotting Time  08/13/2020 263*   • Glucose 08/14/2020 174*   • Glucose 08/14/2020 302*   • BUN 08/14/2020     • Creatinine 08/14/2020 1.46*   • Sodium 08/14/2020 135*   • Potassium 08/14/2020 4.5    • Chloride  08/14/2020 102    • CO2 08/14/2020 23.0    • Calcium 08/14/2020 8.8    • eGFR Non  Amer 08/14/2020 47*   • BUN/Creatinine Ratio 08/14/2020     • Anion Gap 08/14/2020 10.0    • BUN 08/14/2020 26*   Lab on 08/11/2020   Component Date Value   • Glucose 08/11/2020 190*   • BUN 08/11/2020 21    • Creatinine 08/11/2020 1.30*   • Sodium 08/11/2020 138    • Potassium 08/11/2020 5.0    • Chloride 08/11/2020 104    • CO2 08/11/2020 25.2    • Calcium 08/11/2020 9.6    • Total Protein 08/11/2020 7.3    • Albumin 08/11/2020 4.20    • ALT (SGPT) 08/11/2020 18    • AST (SGOT) 08/11/2020 16    • Alkaline Phosphatase 08/11/2020 142*   • Total Bilirubin 08/11/2020 0.3    • eGFR Non African Amer 08/11/2020 54*   • Globulin 08/11/2020 3.1    • A/G Ratio 08/11/2020 1.4    • BUN/Creatinine Ratio 08/11/2020 16.2    • Anion Gap 08/11/2020 8.8    • Protime 08/11/2020 13.9*   • INR 08/11/2020 1.29*   • WBC 08/11/2020 5.29    • RBC 08/11/2020 4.84    • Hemoglobin 08/11/2020 13.9    • Hematocrit 08/11/2020 42.9    • MCV 08/11/2020 88.6    • MCH 08/11/2020 28.7    • MCHC 08/11/2020 32.4    • RDW 08/11/2020 14.0    • RDW-SD 08/11/2020 45.5    • MPV 08/11/2020 11.4    • Platelets 08/11/2020 170    • Neutrophil % 08/11/2020 54.4    • Lymphocyte % 08/11/2020 27.2    • Monocyte % 08/11/2020 13.4*   • Eosinophil % 08/11/2020 4.2    • Basophil % 08/11/2020 0.8    • Immature Grans % 08/11/2020 0.0    • Neutrophils, Absolute 08/11/2020 2.88    • Lymphocytes, Absolute 08/11/2020 1.44    • Monocytes, Absolute 08/11/2020 0.71    • Eosinophils, Absolute 08/11/2020 0.22    • Basophils, Absolute 08/11/2020 0.04    • Immature Grans, Absolute 08/11/2020 0.00    • nRBC 08/11/2020 0.0    • Reference Lab Report 08/11/2020 Testing performed by [facility]  [address] [place] [address]    • COVID19 08/11/2020 Not Detected    Lab on 07/24/2020   Component Date Value   • Glucose 07/24/2020 240*   • BUN 07/24/2020 16    • Creatinine  07/24/2020 1.18    • Sodium 07/24/2020 137    • Potassium 07/24/2020 5.2    • Chloride 07/24/2020 102    • CO2 07/24/2020 20.4*   • Calcium 07/24/2020 9.5    • eGFR Non African Amer 07/24/2020 60*   • BUN/Creatinine Ratio 07/24/2020 13.6    • Anion Gap 07/24/2020 14.6    • COVID19 07/24/2020 Not Detected               Invalid input(s): ALKPO4                        Invalid input(s): LDLCALC                Assessment:          Diagnosis Plan   1. CAD S/P percutaneous coronary angioplasty     2. Essential hypertension     3. Mixed hyperlipidemia     4. New onset atrial fibrillation (CMS/HCC)            Plan:         1. CAD S/P percutaneous coronary angioplasty  Clinically silent.  No chest pain today.  Continue optimize medical therapy secondary prevention.    2. Essential hypertension  Well-controlled on medical therapy    3. Mixed hyperlipidemia  Stable    4. New onset atrial fibrillation (CMS/HCC)  Currently normal sinus rhythm.  Continue Xarelto.                 Gonzalez Ochoa MD  09/09/20  .

## 2020-12-14 NOTE — TELEPHONE ENCOUNTER
MPF pt,     Patient would like to know if he can get a prescription for Nitroglycerin sent to [facility].

## 2020-12-17 RX ORDER — NITROGLYCERIN 0.4 MG/1
TABLET SUBLINGUAL
Qty: 25 TABLET | Refills: 2 | Status: SHIPPED | OUTPATIENT
Start: 2020-12-17 | End: 2020-12-30 | Stop reason: SDUPTHER

## 2020-12-30 ENCOUNTER — OFFICE VISIT (OUTPATIENT)
Dept: CARDIOLOGY | Facility: CLINIC | Age: 75
End: 2020-12-30

## 2020-12-30 VITALS
DIASTOLIC BLOOD PRESSURE: 60 MMHG | SYSTOLIC BLOOD PRESSURE: 140 MMHG | HEIGHT: 71 IN | BODY MASS INDEX: 34.89 KG/M2 | HEART RATE: 74 BPM | WEIGHT: 249.2 LBS | RESPIRATION RATE: 18 BRPM

## 2020-12-30 DIAGNOSIS — I20.0 UNSTABLE ANGINA (HCC): ICD-10-CM

## 2020-12-30 DIAGNOSIS — I20.0 UNSTABLE ANGINA PECTORIS (HCC): Primary | ICD-10-CM

## 2020-12-30 DIAGNOSIS — E78.2 MIXED HYPERLIPIDEMIA: ICD-10-CM

## 2020-12-30 DIAGNOSIS — I10 ESSENTIAL HYPERTENSION: ICD-10-CM

## 2020-12-30 DIAGNOSIS — E11.9 TYPE 2 DIABETES MELLITUS WITHOUT COMPLICATION, WITH LONG-TERM CURRENT USE OF INSULIN (HCC): ICD-10-CM

## 2020-12-30 DIAGNOSIS — I48.91 NEW ONSET ATRIAL FIBRILLATION (HCC): ICD-10-CM

## 2020-12-30 DIAGNOSIS — E03.9 ACQUIRED HYPOTHYROIDISM: ICD-10-CM

## 2020-12-30 DIAGNOSIS — I25.10 CAD S/P PERCUTANEOUS CORONARY ANGIOPLASTY: ICD-10-CM

## 2020-12-30 DIAGNOSIS — Z98.61 CAD S/P PERCUTANEOUS CORONARY ANGIOPLASTY: ICD-10-CM

## 2020-12-30 DIAGNOSIS — Z79.4 TYPE 2 DIABETES MELLITUS WITHOUT COMPLICATION, WITH LONG-TERM CURRENT USE OF INSULIN (HCC): ICD-10-CM

## 2020-12-30 PROCEDURE — 93000 ELECTROCARDIOGRAM COMPLETE: CPT | Performed by: INTERNAL MEDICINE

## 2020-12-30 PROCEDURE — 99214 OFFICE O/P EST MOD 30 MIN: CPT | Performed by: INTERNAL MEDICINE

## 2020-12-30 RX ORDER — ISOSORBIDE MONONITRATE 60 MG/1
90 TABLET, EXTENDED RELEASE ORAL DAILY
Qty: 60 TABLET | Refills: 4 | Status: SHIPPED | OUTPATIENT
Start: 2020-12-30 | End: 2021-01-07 | Stop reason: SDUPTHER

## 2020-12-30 RX ORDER — RANOLAZINE 500 MG/1
500 TABLET, EXTENDED RELEASE ORAL 2 TIMES DAILY
Qty: 60 TABLET | Refills: 4 | Status: SHIPPED | OUTPATIENT
Start: 2020-12-30 | End: 2020-12-30

## 2020-12-30 RX ORDER — RANOLAZINE 1000 MG/1
1000 TABLET, EXTENDED RELEASE ORAL EVERY 12 HOURS SCHEDULED
Qty: 60 TABLET | Refills: 5 | Status: SHIPPED | OUTPATIENT
Start: 2020-12-30 | End: 2020-12-30

## 2020-12-30 RX ORDER — RANOLAZINE 1000 MG/1
1000 TABLET, EXTENDED RELEASE ORAL EVERY 12 HOURS SCHEDULED
Qty: 60 TABLET | Refills: 5 | Status: SHIPPED | OUTPATIENT
Start: 2020-12-30 | End: 2021-01-07 | Stop reason: SDUPTHER

## 2020-12-30 RX ORDER — METFORMIN HYDROCHLORIDE 500 MG/1
500 TABLET, EXTENDED RELEASE ORAL EVERY 12 HOURS
COMMUNITY
Start: 2016-10-18 | End: 2022-03-09

## 2020-12-30 RX ORDER — NITROGLYCERIN 0.4 MG/1
TABLET SUBLINGUAL
Qty: 25 TABLET | Refills: 2 | Status: SHIPPED | OUTPATIENT
Start: 2020-12-30

## 2020-12-30 NOTE — PROGRESS NOTES
Subjective:     Encounter Date:12/30/2020      Patient ID: Carroll Rodgers is a 75 y.o. male.    Chief Complaint:  Chief Complaint   Patient presents with   • Chest Pain       HPI:  Carroll is a very pleasant 75-year-old patient initially self-referred.  He is previously seen cardiologist in [facility].   He has known coronary artery disease and is status post coronary artery bypass grafting in 1996 with a LIMA to the LAD and SVG to the RCA and circumflex artery.  He subsequently had total occlusion of his SVG graft to the obtuse marginal branch and underwent stenting with a drug-eluting stent 7/1/2015 as well as his SVG graft to the right coronary artery.  Subsequently he developed exertional and rest angina with his worst symptom being exertional dyspnea.  Cardiac catheterization performed 1/30/2018 showed critical stenosis of the jailed segment of the inferior branch of the circumflex obtuse marginal branch with what appeared to be 70% in-stent restenosis of the superior parent limb of the circumflex into the superior limb.  He was told that medical therapy was his best option.  Since then he has had worsening angina on Ranexa and worsening dyspnea.    He came for second opinion.    I personally reviewed the cardiac catheterization images from 1/30/2018 and confirmed the above results.  In addition he has a patent LIMA to the LAD with a distal apical 80% narrowing that is inconsequential.  He has a chronic total occlusion of his SVG graft to the his right coronary artery and again an occlusion of his SVG to the OM.  The circumflex OM native vessel is as described above.  His ECG last visit in the office showed normal sinus rhythm with APCs and a old left bundle branch block.      His chest pain syndrome was that of exertional angina often if not always associated with dyspnea with chronic stable angina class III symptoms versus unstable angina given the increase in frequency.  He does not have associated  nausea or diaphoresis.    He underwent PCI and stenting of the circumflex artery 9/9/2019 with an excellent angiographic result with restoration of MONIE-3 flow.  Since then he had done well.    However he comes in today complaining of exertional dyspnea with one episode of acute onset chest pain that lasts approximately 1 minute similar to his previous angina pectoris.  Performed an ECG in the office today which showed atrial fibrillation rate controlled at 78 bpm which is a new finding with poor R wave progression.  He has not had any chest pain for the past week.  Of note he did start his Isordil back.  He has been taking his dual antiplatelet therapy with very little interruption per his wife.  Nitroglycerin appeared to relieve his initial pain described above.    He was recently diagnosed with new onset paroxysmal atrial fibrillation.  I personally reviewed his echo from 8/23/2019 which showed normal LV systolic function with diastolic grade 1A impaired relaxation normal RV function with no significant valvular heart disease.  He was loaded with amiodarone which chemically converted him to normal sinus rhythm.  He felt a little bit better since then. He had pulmonary function tests and thyroid function test sent.  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  He was therefore taken off amiodarone started on levothyroxine and maintained on metoprolol and Xarelto for anticoagulation.    Earlier this year he returned with some shortness of breath and chest tightness with extreme exertion.  I personally reviewed his echocardiogram from 2/28/2020 which shows normal LV systolic function with ejection fraction of 55 to 60% and underlying grade 1A diastolic dysfunction with no significant valvular heart disease.  He underwent cardiac catheterization which revealed critical in-stent restenosis of the superior limb of the obtuse marginal branch previously stented.   He underwent PCI and stenting 8/13/2020.    Unfortunately he returns after only 4 months with identical symptoms of chest tightness and shortness of breath.  I took him off of his Imdur because he was doing well.  I also took him off of Imdur and ranolazine because he was doing well.    The following portions of the patient's history were reviewed and updated as appropriate: allergies, current medications, past family history, past medical history, past social history, past surgical history and problem list.    Problem List:  Patient Active Problem List   Diagnosis   • Type 2 diabetes mellitus without complication, with long-term current use of insulin (CMS/[facility])   • Mixed hyperlipidemia   • Essential hypertension   • Acquired hypothyroidism   • CAD S/P percutaneous coronary angioplasty   • New onset atrial fibrillation (CMS/[facility])   • Unstable angina (CMS/[facility])       Past Medical History:  Past Medical History:   Diagnosis Date   • Coronary artery disease    • Diabetes mellitus (CMS/[facility])    • Hyperlipidemia    • Hypertension    • Hypothyroidism    • New onset atrial fibrillation (CMS/[facility]) 1/11/2020   • Stroke (CMS/HCC)     X2   • Unstable angina (CMS/[facility]) 8/22/2019       Past Surgical History:  Past Surgical History:   Procedure Laterality Date   • CARDIAC CATHETERIZATION  01/30/2018    EF 40%, Distal LM 50% stenosis, Proximal LM 50% stenosis, Proximal LAD 95% stenosis, Mid % stenosis, Apical LAD 70% stenosis, Significant ISR at bifurcation, 90% stenosis at circumflex portoin, 90% stenosis at ostial OM, distal circumflex 80% stenosis, Mid % stenosis, SVG to % occluded at ostium, SVG to OM branches 100% occluded, LIMA to LAD has no significant disease   • CARDIAC CATHETERIZATION N/A 9/9/2019    PCI to OM1 and OM2 of LCX using overlapping Xience LARISA' and Pato LARISA. PCI to LM into distal LM and ostial proximal circ using Xience LARISA. Laser atherectomy.   • CARDIAC CATHETERIZATION N/A 9/9/2019     "Procedure: Atherectomy-coronary;  Surgeon: Gonzalez Ochoa MD;  Location: [facility] CATH INVASIVE LOCATION;  Service: Cardiology   • CARDIAC CATHETERIZATION N/A 8/13/2020    Procedure: Left Heart Cath;  Surgeon: Gonzalez Ochoa MD;  Location: [facility] CATH INVASIVE LOCATION;  Service: Cardiology;  Laterality: N/A;   • CARDIAC CATHETERIZATION N/A 8/13/2020    Procedure: Percutaneous Coronary Intervention;  Surgeon: Gonzalez Ochoa MD;  Location: [facility] CATH INVASIVE LOCATION;  Service: Cardiology;  Laterality: N/A;  PCI  Left Main PTCA/LARISA  PCI Circx/OM  PTCA/LARISA   • CORONARY ARTERY BYPASS GRAFT  1996    X3   • CORONARY STENT PLACEMENT  07/01/2015    Xience Alpine LARISA to RCA (X2) and Mid Circ (X1)-Dr. Rodriguez   • CORONARY STENT PLACEMENT  2006   • CORONARY STENT PLACEMENT  2010       Social History:  Social History     Socioeconomic History   • Marital status:      Spouse name: Not on file   • Number of children: Not on file   • Years of education: Not on file   • Highest education level: Not on file   Tobacco Use   • Smoking status: Former Smoker   • Smokeless tobacco: Never Used   • Tobacco comment: 40 years ago   Substance and Sexual Activity   • Alcohol use: Yes   • Drug use: No   • Sexual activity: Defer       Allergies:  Allergies   Allergen Reactions   • Peanut-Containing Drug Products          Review of Symptoms:  Constitutional: Patient afebrile no chills or unexpected weight changes  Respiratory: No cough, no wheezing or dyspnea  Cardiovascular: Today the patient complains of no chest pain, palpitations, dyspnea, orthopnea and no edema  Gastrointestinal: No nausea, vomiting, constipation or diarrhea.  No melena or dark stools    All other systems reviewed and are negative             Objective:         /60 (BP Location: Left arm, Patient Position: Sitting)   Pulse 74   Resp 18   Ht 180.3 cm (71\")   Wt 113 kg (249 lb 3.2 oz)   BMI 34.76 kg/m²     Physical " exam  Constitutional: well-nourished, and appears stated age in no acute distress  PERRL: Conjunctiva clear, no pallor, anicteric  HENMT: normocephalic, normal dentition, no cyanosis or pallor  Neck:no bruits, or thrills and bilateral normal carotid upstroke. Normal jugular venous pressure  Cardiovascular: No parasternal heaves an non-displaced focal PMI. Normal rate and rhythm: no rub, gallop, murmur or click and normal S1 and S2; no lower or upper extremity edema.   Lungs: unlabored, no wheezing with no rales or rhonchi on auscultation.  Extremities: Warm, no clubbing, cyanosis. Full and equal peripheral pulses in extremities with no bruits appreciated.   Abdomen: soft, non-tender, non-distended  Musculoskeletal: no joint tenderness or swelling and no erythema  Skin: Warm and dry, non-erythematous   Neuro:alert and normal affect. Oriented to time, place and person.           In-Office Procedure(s):    ECG 12 Lead    Date/Time: 12/30/2020 11:14 AM  Performed by: Gonzalez Ochoa MD  Authorized by: Gonzalez Ochoa MD   Rhythm: sinus rhythm  Comments: Incomplete left bundle branch block with no reciprocal changes.            ASCVD RIsk Score::  The ASCVD Risk score (Alec DC Jr., et al., 2013) failed to calculate for the following reasons:    Cannot find a previous HDL lab    Cannot find a previous total cholesterol lab    Recent Radiology:  Imaging Results (Most Recent)     None          Lab Review:   No visits with results within 2 Month(s) from this visit.   Latest known visit with results is:   Lab on 08/21/2020   Component Date Value   • Glucose 08/21/2020 229*   • BUN 08/21/2020 16    • Creatinine 08/21/2020 1.25    • Sodium 08/21/2020 137    • Potassium 08/21/2020 4.9    • Chloride 08/21/2020 103    • CO2 08/21/2020 25.1    • Calcium 08/21/2020 9.4    • eGFR Non African Amer 08/21/2020 56*   • BUN/Creatinine Ratio 08/21/2020 12.8    • Anion Gap 08/21/2020 8.9               Invalid input(s):  ALKPO4                        Invalid input(s): LDLCALC                Assessment:          Diagnosis Plan   1. Unstable angina pectoris (CMS/HCC)  ECG 12 Lead   2. Unstable angina (CMS/HCC)     3. Type 2 diabetes mellitus without complication, with long-term current use of insulin (CMS/HCC)     4. New onset atrial fibrillation (CMS/HCC)     5. Mixed hyperlipidemia     6. Essential hypertension     7. CAD S/P percutaneous coronary angioplasty     8. Acquired hypothyroidism            Plan:         1. Unstable angina pectoris (CMS/HCC)  Although his angina is unstable in its frequency, it is controlled with nitroglycerin.  It only with exertion and does not occur at rest.  He has no associated diaphoresis or nausea.  Given the likelihood of reocclusion of the triple stented vessel, we are going to try optimize medical therapy for angina pectoris.  Were going to initiate antianginals as follows: Imdur 90 mg p.o. daily with ranolazine 1000 mg p.o. twice daily.  We will see him back in 30 days to assess the strategy.  - ECG 12 Lead    2. Unstable angina (CMS/HCC)  See above    3. Type 2 diabetes mellitus without complication, with long-term current use of insulin (CMS/HCC)  Well-controlled    4. New onset atrial fibrillation (CMS/HCC)  On anticoagulation with current normal sinus rhythm.  Incomplete left bundle branch block with otherwise normal tracing.    5. Mixed hyperlipidemia  Optimize medical therapy    6. Essential hypertension  Well-controlled on medical therapy    7. CAD S/P percutaneous coronary angioplasty  Recurrent in-stent restenosis.  Will optimize antianginals as described above.    8. Acquired hypothyroidism  Due to amiodarone.  This has been stopped with no recurrence of atrial fibrillation.                 Gonzalez Ochoa MD  12/30/20  .

## 2021-01-07 RX ORDER — RANOLAZINE 1000 MG/1
1000 TABLET, EXTENDED RELEASE ORAL EVERY 12 HOURS SCHEDULED
Qty: 60 TABLET | Refills: 5 | Status: SHIPPED | OUTPATIENT
Start: 2021-01-07 | End: 2021-02-03 | Stop reason: SDUPTHER

## 2021-01-07 RX ORDER — ISOSORBIDE MONONITRATE 60 MG/1
90 TABLET, EXTENDED RELEASE ORAL DAILY
Qty: 60 TABLET | Refills: 4 | Status: SHIPPED | OUTPATIENT
Start: 2021-01-07 | End: 2021-02-26 | Stop reason: SDUPTHER

## 2021-01-19 ENCOUNTER — TELEPHONE (OUTPATIENT)
Dept: CARDIOLOGY | Facility: CLINIC | Age: 76
End: 2021-01-19

## 2021-01-19 NOTE — TELEPHONE ENCOUNTER
"[facility]   PT CALLED STATING HE HADN'T RECEIVED HIS RX OF \"isosorbide mononitrate (IMDUR) 60 MG 24 hr tablet AND ranolazine (Ranexa) 1000 MG 12 hr tablet [facility] / stated prescription wasn't signed    CB# [phone]   "

## 2021-02-03 ENCOUNTER — OFFICE VISIT (OUTPATIENT)
Dept: CARDIOLOGY | Facility: CLINIC | Age: 76
End: 2021-02-03

## 2021-02-03 VITALS
DIASTOLIC BLOOD PRESSURE: 66 MMHG | WEIGHT: 254 LBS | RESPIRATION RATE: 18 BRPM | BODY MASS INDEX: 35.56 KG/M2 | SYSTOLIC BLOOD PRESSURE: 138 MMHG | HEART RATE: 79 BPM | HEIGHT: 71 IN

## 2021-02-03 DIAGNOSIS — Z79.4 TYPE 2 DIABETES MELLITUS WITHOUT COMPLICATION, WITH LONG-TERM CURRENT USE OF INSULIN (HCC): ICD-10-CM

## 2021-02-03 DIAGNOSIS — I25.10 CAD S/P PERCUTANEOUS CORONARY ANGIOPLASTY: Primary | ICD-10-CM

## 2021-02-03 DIAGNOSIS — E11.9 TYPE 2 DIABETES MELLITUS WITHOUT COMPLICATION, WITH LONG-TERM CURRENT USE OF INSULIN (HCC): ICD-10-CM

## 2021-02-03 DIAGNOSIS — I10 ESSENTIAL HYPERTENSION: ICD-10-CM

## 2021-02-03 DIAGNOSIS — Z98.61 CAD S/P PERCUTANEOUS CORONARY ANGIOPLASTY: Primary | ICD-10-CM

## 2021-02-03 DIAGNOSIS — E78.2 MIXED HYPERLIPIDEMIA: ICD-10-CM

## 2021-02-03 PROCEDURE — 99214 OFFICE O/P EST MOD 30 MIN: CPT | Performed by: INTERNAL MEDICINE

## 2021-02-03 RX ORDER — RANOLAZINE 1000 MG/1
1000 TABLET, EXTENDED RELEASE ORAL EVERY 12 HOURS SCHEDULED
Qty: 60 TABLET | Refills: 5 | Status: SHIPPED | OUTPATIENT
Start: 2021-02-03 | End: 2021-02-11 | Stop reason: SDUPTHER

## 2021-02-03 NOTE — PROGRESS NOTES
Subjective:     Encounter Date:02/03/2021      Patient ID: Carroll Rodgers is a 75 y.o. male.    Chief Complaint:  Chief Complaint   Patient presents with   • Follow-up       HPI:  Carroll is a very pleasant 75-year-old patient initially self-referred.  He is previously seen cardiologist in [facility].   He has known coronary artery disease and is status post coronary artery bypass grafting in 1996 with a LIMA to the LAD and SVG to the RCA and circumflex artery.  He subsequently had total occlusion of his SVG graft to the obtuse marginal branch and underwent stenting with a drug-eluting stent 7/1/2015 as well as his SVG graft to the right coronary artery.  Subsequently he developed exertional and rest angina with his worst symptom being exertional dyspnea.  Cardiac catheterization performed 1/30/2018 showed critical stenosis of the jailed segment of the inferior branch of the circumflex obtuse marginal branch with what appeared to be 70% in-stent restenosis of the superior parent limb of the circumflex into the superior limb.  He was told that medical therapy was his best option.  Since then he has had worsening angina on Ranexa and worsening dyspnea.    He came for second opinion.    I personally reviewed the cardiac catheterization images from 1/30/2018 and confirmed the above results.  In addition he has a patent LIMA to the LAD with a distal apical 80% narrowing that is inconsequential.  He has a chronic total occlusion of his SVG graft to the his right coronary artery and again an occlusion of his SVG to the OM.  The circumflex OM native vessel is as described above.  His ECG last visit in the office showed normal sinus rhythm with APCs and a old left bundle branch block.      His chest pain syndrome was that of exertional angina often if not always associated with dyspnea with chronic stable angina class III symptoms versus unstable angina given the increase in frequency.  He does not have associated  nausea or diaphoresis.    He underwent PCI and stenting of the circumflex artery 9/9/2019 with an excellent angiographic result with restoration of MONIE-3 flow.  Since then he had done well.    However he comes in today complaining of exertional dyspnea with one episode of acute onset chest pain that lasts approximately 1 minute similar to his previous angina pectoris.  Performed an ECG in the office today which showed atrial fibrillation rate controlled at 78 bpm which is a new finding with poor R wave progression.  He has not had any chest pain for the past week.  Of note he did start his Isordil back.  He has been taking his dual antiplatelet therapy with very little interruption per his wife.  Nitroglycerin appeared to relieve his initial pain described above.    He was recently diagnosed with new onset paroxysmal atrial fibrillation.  I personally reviewed his echo from 8/23/2019 which showed normal LV systolic function with diastolic grade 1A impaired relaxation normal RV function with no significant valvular heart disease.  He was loaded with amiodarone which chemically converted him to normal sinus rhythm.  He felt a little bit better since then. He had pulmonary function tests and thyroid function test sent.  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  He was therefore taken off amiodarone started on levothyroxine and maintained on metoprolol and Xarelto for anticoagulation.    Earlier this year he returned with some shortness of breath and chest tightness with extreme exertion.  I personally reviewed his echocardiogram from 2/28/2020 which shows normal LV systolic function with ejection fraction of 55 to 60% and underlying grade 1A diastolic dysfunction with no significant valvular heart disease.  He underwent cardiac catheterization which revealed critical in-stent restenosis of the superior limb of the obtuse marginal branch previously stented.   He underwent PCI and stenting 8/13/2020.    Unfortunately he returned after only 4 months with identical symptoms of chest tightness and shortness of breath.  I took him off of his Imdur because he was doing well.  I also took him off of Imdur and ranolazine because he was doing well.    Although his angina was unstable in its frequency, it is controlled with nitroglycerin.  It only with exertion and does not occur at rest.  He has no associated diaphoresis or nausea.  Given the likelihood of reocclusion of the triple stented vessel, we are going to try optimize medical therapy for angina pectoris.  We were were going to initiate antianginals as follows: Imdur 90 mg p.o. daily with ranolazine 1000 mg p.o. twice daily.  But [facility].    He is back today 30 days after initiating above.  He feels no better but no worse.  Still is not on the ranolazine.      The following portions of the patient's history were reviewed and updated as appropriate: allergies, current medications, past family history, past medical history, past social history, past surgical history and problem list.    Problem List:  Patient Active Problem List   Diagnosis   • Type 2 diabetes mellitus without complication, with long-term current use of insulin (CMS/[facility])   • Mixed hyperlipidemia   • Essential hypertension   • Acquired hypothyroidism   • CAD S/P percutaneous coronary angioplasty   • New onset atrial fibrillation (CMS/[facility])   • Unstable angina (CMS/HCC)       Past Medical History:  Past Medical History:   Diagnosis Date   • Coronary artery disease    • Diabetes mellitus (CMS/[facility])    • Hyperlipidemia    • Hypertension    • Hypothyroidism    • New onset atrial fibrillation (CMS/HCC) 1/11/2020   • Stroke (CMS/[facility])     X2   • Unstable angina (CMS/HCC) 8/22/2019       Past Surgical History:  Past Surgical History:   Procedure Laterality Date   • CARDIAC CATHETERIZATION  01/30/2018    EF 40%, Distal LM 50%  stenosis, Proximal LM 50% stenosis, Proximal LAD 95% stenosis, Mid % stenosis, Apical LAD 70% stenosis, Significant ISR at bifurcation, 90% stenosis at circumflex portoin, 90% stenosis at ostial OM, distal circumflex 80% stenosis, Mid % stenosis, SVG to % occluded at ostium, SVG to OM branches 100% occluded, LIMA to LAD has no significant disease   • CARDIAC CATHETERIZATION N/A 9/9/2019    PCI to OM1 and OM2 of LCX using overlapping Xience LARISA' and Pato LARISA. PCI to LM into distal LM and ostial proximal circ using Xience LARISA. Laser atherectomy.   • CARDIAC CATHETERIZATION N/A 9/9/2019    Procedure: Atherectomy-coronary;  Surgeon: Gonzalez Ochoa MD;  Location: [facility] CATH INVASIVE LOCATION;  Service: Cardiology   • CARDIAC CATHETERIZATION N/A 8/13/2020    Procedure: Left Heart Cath;  Surgeon: Gonzalez Ochoa MD;  Location: [facility] CATH INVASIVE LOCATION;  Service: Cardiology;  Laterality: N/A;   • CARDIAC CATHETERIZATION N/A 8/13/2020    Procedure: Percutaneous Coronary Intervention;  Surgeon: Gonzalez Ochoa MD;  Location: [facility] CATH INVASIVE LOCATION;  Service: Cardiology;  Laterality: N/A;  PCI  Left Main PTCA/LARISA  PCI Circx/OM  PTCA/LARISA   • CORONARY ARTERY BYPASS GRAFT  1996    X3   • CORONARY STENT PLACEMENT  07/01/2015    Xience Alpine LARISA to RCA (X2) and Mid Circ (X1)-Dr. Rodriguez   • CORONARY STENT PLACEMENT  2006   • CORONARY STENT PLACEMENT  2010       Social History:  Social History     Socioeconomic History   • Marital status:      Spouse name: Not on file   • Number of children: Not on file   • Years of education: Not on file   • Highest education level: Not on file   Tobacco Use   • Smoking status: Former Smoker   • Smokeless tobacco: Never Used   • Tobacco comment: 40 years ago   Substance and Sexual Activity   • Alcohol use: Yes   • Drug use: No   • Sexual activity: Defer       Allergies:  Allergies   Allergen Reactions   • Peanut-Containing  "Drug Products          Review of Symptoms:  Constitutional: Patient afebrile no chills or unexpected weight changes  Respiratory: No cough, no wheezing or dyspnea  Cardiovascular: Today the patient complains of no chest pain, palpitations, dyspnea, orthopnea and no edema  Gastrointestinal: No nausea, vomiting, constipation or diarrhea.  No melena or dark stools    All other systems reviewed and are negative         Objective:         /66 (BP Location: Left arm, Patient Position: Sitting)   Pulse 79   Resp 18   Ht 180.3 cm (71\")   Wt 115 kg (254 lb)   BMI 35.43 kg/m²     Physical exam  Constitutional: well-nourished, and appears stated age in no acute distress  PERRL: Conjunctiva clear, no pallor, anicteric  HENMT: normocephalic, normal dentition, no cyanosis or pallor  Neck:no bruits, or thrills and bilateral normal carotid upstroke. Normal jugular venous pressure  Cardiovascular: No parasternal heaves an non-displaced focal PMI. Normal rate and rhythm: no rub, gallop, murmur or click and normal S1 and S2; no lower or upper extremity edema.   Lungs: unlabored, no wheezing with no rales or rhonchi on auscultation.  Extremities: Warm, no clubbing, cyanosis. Full and equal peripheral pulses in extremities with no bruits appreciated.   Abdomen: soft, non-tender, non-distended  Musculoskeletal: no joint tenderness or swelling and no erythema  Skin: Warm and dry, non-erythematous   Neuro:alert and normal affect. Oriented to time, place and person.       In-Office Procedure(s):  Procedures    ASCVD RIsk Score::  The ASCVD Risk score (Drayton DC Jr., et al., 2013) failed to calculate for the following reasons:    Cannot find a previous HDL lab    Cannot find a previous total cholesterol lab    Recent Radiology:  Imaging Results (Most Recent)     None          Lab Review:   No visits with results within 2 Month(s) from this visit.   Latest known visit with results is:   Lab on 08/21/2020   Component Date Value   • " Glucose 08/21/2020 229*   • BUN 08/21/2020 16    • Creatinine 08/21/2020 1.25    • Sodium 08/21/2020 137    • Potassium 08/21/2020 4.9    • Chloride 08/21/2020 103    • CO2 08/21/2020 25.1    • Calcium 08/21/2020 9.4    • eGFR Non African Amer 08/21/2020 56*   • BUN/Creatinine Ratio 08/21/2020 12.8    • Anion Gap 08/21/2020 8.9               Invalid input(s): ALKPO4                        Invalid input(s): LDLCALC                Assessment:          Diagnosis Plan   1. CAD S/P percutaneous coronary angioplasty     2. Essential hypertension     3. Mixed hyperlipidemia     4. Type 2 diabetes mellitus without complication, with long-term current use of insulin (CMS/[facility])            Plan:         1. CAD S/P percutaneous coronary angioplasty  Chronic stable angina class II-III.  [facility] and started at 1000 mg p.o. twice daily.  Continue other antianginals.    2. Essential hypertension  Well-controlled on medical therapy    3. Mixed hyperlipidemia  Stable    4. Type 2 diabetes mellitus without complication, with long-term current use of insulin (CMS/[facility])  Well-controlled on medical therapy                 Gonzalez Ochoa MD  02/03/21  .

## 2021-02-11 RX ORDER — RANOLAZINE 1000 MG/1
1000 TABLET, EXTENDED RELEASE ORAL EVERY 12 HOURS SCHEDULED
Qty: 60 TABLET | Refills: 0 | Status: SHIPPED | OUTPATIENT
Start: 2021-02-11

## 2021-02-26 RX ORDER — ISOSORBIDE MONONITRATE 60 MG/1
90 TABLET, EXTENDED RELEASE ORAL DAILY
Qty: 60 TABLET | Refills: 11 | Status: ON HOLD | OUTPATIENT
Start: 2021-02-26 | End: 2021-08-26

## 2021-02-26 NOTE — TELEPHONE ENCOUNTER
[facility] PT    PT needs a prescription of Isosorbid 30 mg takes 3 pills a day [facility] Franco. Fax:[phone]

## 2021-05-12 ENCOUNTER — OFFICE VISIT (OUTPATIENT)
Dept: CARDIOLOGY | Facility: CLINIC | Age: 76
End: 2021-05-12

## 2021-05-12 VITALS
RESPIRATION RATE: 20 BRPM | DIASTOLIC BLOOD PRESSURE: 66 MMHG | WEIGHT: 244.8 LBS | SYSTOLIC BLOOD PRESSURE: 116 MMHG | OXYGEN SATURATION: 98 % | HEART RATE: 69 BPM | BODY MASS INDEX: 34.27 KG/M2 | HEIGHT: 71 IN

## 2021-05-12 DIAGNOSIS — I25.10 CAD S/P PERCUTANEOUS CORONARY ANGIOPLASTY: ICD-10-CM

## 2021-05-12 DIAGNOSIS — Z98.61 CAD S/P PERCUTANEOUS CORONARY ANGIOPLASTY: ICD-10-CM

## 2021-05-12 DIAGNOSIS — E78.2 MIXED HYPERLIPIDEMIA: ICD-10-CM

## 2021-05-12 DIAGNOSIS — I20.8 ANGINA AT REST (HCC): Primary | ICD-10-CM

## 2021-05-12 DIAGNOSIS — I10 ESSENTIAL HYPERTENSION: ICD-10-CM

## 2021-05-12 PROBLEM — I20.89 ANGINA AT REST: Status: ACTIVE | Noted: 2020-08-13

## 2021-05-12 PROCEDURE — 99214 OFFICE O/P EST MOD 30 MIN: CPT | Performed by: INTERNAL MEDICINE

## 2021-05-12 NOTE — PROGRESS NOTES
Subjective:     Encounter Date:05/12/2021      Patient ID: Carroll Rodgers is a 75 y.o. male.    Chief Complaint:  Chief Complaint   Patient presents with   • Coronary Artery Disease   • Hypertension   • Hyperlipidemia       HPI:  Carroll is a very pleasant 75-year-old patient initially self-referred.  He is previously seen cardiologist in [facility].   He has known coronary artery disease and is status post coronary artery bypass grafting in 1996 with a LIMA to the LAD and SVG to the RCA and circumflex artery.  He subsequently had total occlusion of his SVG graft to the obtuse marginal branch and underwent stenting with a drug-eluting stent 7/1/2015 as well as his SVG graft to the right coronary artery.  Subsequently he developed exertional and rest angina with his worst symptom being exertional dyspnea.  Cardiac catheterization performed 1/30/2018 showed critical stenosis of the jailed segment of the inferior branch of the circumflex obtuse marginal branch with what appeared to be 70% in-stent restenosis of the superior parent limb of the circumflex into the superior limb.  He was told that medical therapy was his best option.  Since then he has had worsening angina on Ranexa and worsening dyspnea.    He came for second opinion.    I personally reviewed the cardiac catheterization images from 1/30/2018 and confirmed the above results.  In addition he has a patent LIMA to the LAD with a distal apical 80% narrowing that is inconsequential.  He has a chronic total occlusion of his SVG graft to the his right coronary artery and again an occlusion of his SVG to the OM.  The circumflex OM native vessel is as described above.  His ECG last visit in the office showed normal sinus rhythm with APCs and a old left bundle branch block.      His chest pain syndrome was that of exertional angina often if not always associated with dyspnea with chronic stable angina class III symptoms versus unstable angina given the  increase in frequency.  He does not have associated nausea or diaphoresis.    He underwent PCI and stenting of the circumflex artery 9/9/2019 with an excellent angiographic result with restoration of MONIE-3 flow.  Since then he had done well.    He was recently diagnosed with new onset paroxysmal atrial fibrillation.  I personally reviewed his echo from 8/23/2019 which showed normal LV systolic function with diastolic grade 1A impaired relaxation normal RV function with no significant valvular heart disease.  He was loaded with amiodarone which chemically converted him to normal sinus rhythm.  He felt a little bit better since then. He had pulmonary function tests and thyroid function test sent.  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  He was therefore taken off amiodarone started on levothyroxine and maintained on metoprolol and Xarelto for anticoagulation.    Earlier this year he returned with some shortness of breath and chest tightness with extreme exertion.  I personally reviewed his echocardiogram from 2/28/2020 which shows normal LV systolic function with ejection fraction of 55 to 60% and underlying grade 1A diastolic dysfunction with no significant valvular heart disease.  He underwent cardiac catheterization which revealed critical in-stent restenosis of the superior limb of the obtuse marginal branch previously stented.  He underwent PCI and stenting 8/13/2020.    Unfortunately he returned after only 4 months with identical symptoms of chest tightness and shortness of breath.  I took him off of his Imdur because he was doing well.  I also took him off of Imdur and ranolazine because he was doing well.    Once again although his angina was unstable in its frequency, it is controlled with nitroglycerin.  It occurs only with exertion and does not occur at rest.  He has no associated diaphoresis or nausea.  Again given the likelihood of  reocclusion of the triple stented vessel, we are going to try optimize medical therapy for angina pectoris.  We were were going to initiate antianginals as follows: Imdur 90 mg p.o. daily with ranolazine 1000 mg p.o. twice daily.  But [facility]    He is back today with stable class II angina.        The following portions of the patient's history were reviewed and updated as appropriate: allergies, current medications, past family history, past medical history, past social history, past surgical history and problem list.    Problem List:  Patient Active Problem List   Diagnosis   • Type 2 diabetes mellitus without complication, with long-term current use of insulin (CMS/[facility])   • Mixed hyperlipidemia   • Essential hypertension   • Acquired hypothyroidism   • CAD S/P percutaneous coronary angioplasty   • New onset atrial fibrillation (CMS/[facility])   • Angina at rest (CMS/[facility])       Past Medical History:  Past Medical History:   Diagnosis Date   • Coronary artery disease    • Diabetes mellitus (CMS/[facility])    • Hyperlipidemia    • Hypertension    • Hypothyroidism    • New onset atrial fibrillation (CMS/[facility]) 1/11/2020   • Stroke (CMS/H[facility])     X2   • Unstable angina (CMS/[facility]) 8/22/2019       Past Surgical History:  Past Surgical History:   Procedure Laterality Date   • CARDIAC CATHETERIZATION  01/30/2018    EF 40%, Distal LM 50% stenosis, Proximal LM 50% stenosis, Proximal LAD 95% stenosis, Mid % stenosis, Apical LAD 70% stenosis, Significant ISR at bifurcation, 90% stenosis at circumflex portoin, 90% stenosis at ostial OM, distal circumflex 80% stenosis, Mid % stenosis, SVG to % occluded at ostium, SVG to OM branches 100% occluded, LIMA to LAD has no significant disease   • CARDIAC CATHETERIZATION N/A 9/9/2019    PCI to OM1 and OM2 of LCX using overlapping Xience LARISA' and Pato LARISA. PCI to LM into distal LM and ostial proximal circ using Xience LARISA. Laser atherectomy.  "  • CARDIAC CATHETERIZATION N/A 9/9/2019    Procedure: Atherectomy-coronary;  Surgeon: Gonzalez Ochoa MD;  Location: [facility] CATH INVASIVE LOCATION;  Service: Cardiology   • CARDIAC CATHETERIZATION N/A 8/13/2020    Procedure: Left Heart Cath;  Surgeon: Gonzalez Ochoa MD;  Location: [facility] CATH INVASIVE LOCATION;  Service: Cardiology;  Laterality: N/A;   • CARDIAC CATHETERIZATION N/A 8/13/2020    Procedure: Percutaneous Coronary Intervention;  Surgeon: Gonzalez Ochoa MD;  Location: [facility] CATH INVASIVE LOCATION;  Service: Cardiology;  Laterality: N/A;  PCI  Left Main PTCA/LARISA  PCI Circx/OM  PTCA/LARISA   • CORONARY ARTERY BYPASS GRAFT  1996    X3   • CORONARY STENT PLACEMENT  07/01/2015    Xience Alpine LARISA to RCA (X2) and Mid Circ (X1)-Dr. Rodriguez   • CORONARY STENT PLACEMENT  2006   • CORONARY STENT PLACEMENT  2010       Social History:  Social History     Socioeconomic History   • Marital status:      Spouse name: Not on file   • Number of children: Not on file   • Years of education: Not on file   • Highest education level: Not on file   Tobacco Use   • Smoking status: Former Smoker   • Smokeless tobacco: Never Used   • Tobacco comment: 40 years ago   Substance and Sexual Activity   • Alcohol use: Yes   • Drug use: No   • Sexual activity: Defer       Allergies:  Allergies   Allergen Reactions   • Peanut-Containing Drug Products          Review of Symptoms:  Constitutional: Patient afebrile no chills or unexpected weight changes  Respiratory: No cough, no wheezing or dyspnea  Cardiovascular: Today the patient complains of no chest pain, palpitations, dyspnea, orthopnea and no edema  Gastrointestinal: No nausea, vomiting, constipation or diarrhea.  No melena or dark stools    All other systems reviewed and are negative         Objective:         /66 (BP Location: Left arm, Patient Position: Sitting, Cuff Size: Large Adult)   Pulse 69   Resp 20   Ht 180.3 cm (71\")   " Wt 111 kg (244 lb 12.8 oz)   SpO2 98%   BMI 34.14 kg/m²     Physical exam  Constitutional: well-nourished, and appears stated age in no acute distress  PERRL: Conjunctiva clear, no pallor, anicteric  HENMT: normocephalic, normal dentition, no cyanosis or pallor  Neck:no bruits, or thrills and bilateral normal carotid upstroke. Normal jugular venous pressure  Cardiovascular: No parasternal heaves an non-displaced focal PMI. Normal rate and rhythm: no rub, gallop, murmur or click and normal S1 and S2; no lower or upper extremity edema.   Lungs: unlabored, no wheezing with no rales or rhonchi on auscultation.  Extremities: Warm, no clubbing, cyanosis. Full and equal peripheral pulses in extremities with no bruits appreciated.   Abdomen: soft, non-tender, non-distended  Musculoskeletal: no joint tenderness or swelling and no erythema  Skin: Warm and dry, non-erythematous   Neuro:alert and normal affect. Oriented to time, place and person.           In-Office Procedure(s):  Procedures    ASCVD RIsk Score::  The ASCVD Risk score (Alec DC Jr., et al., 2013) failed to calculate for the following reasons:    Cannot find a previous HDL lab    Cannot find a previous total cholesterol lab    Recent Radiology:  Imaging Results (Most Recent)     None          Lab Review:   No visits with results within 2 Month(s) from this visit.   Latest known visit with results is:   Lab on 08/21/2020   Component Date Value   • Glucose 08/21/2020 229*   • BUN 08/21/2020 16    • Creatinine 08/21/2020 1.25    • Sodium 08/21/2020 137    • Potassium 08/21/2020 4.9    • Chloride 08/21/2020 103    • CO2 08/21/2020 25.1    • Calcium 08/21/2020 9.4    • eGFR Non African Amer 08/21/2020 56*   • BUN/Creatinine Ratio 08/21/2020 12.8    • Anion Gap 08/21/2020 8.9               Invalid input(s): ALKPO4                        Invalid input(s): LDLCALC                Assessment:          Diagnosis Plan   1. CAD S/P percutaneous coronary angioplasty     2.  Essential hypertension     3. Mixed hyperlipidemia     4. Angina at rest (CMS/[facility])            Plan:         1. CAD S/P percutaneous coronary angioplasty  Stable class II angina.  Continue optimize medical therapy as secondary prevention with the inclusion of ranolazine.    2. Essential hypertension  Well-controlled on medical therapy    3. Mixed hyperlipidemia  Stable    4. Angina at rest (CMS/[facility])  Very infrequent                 Gonzalez Ochoa MD  05/12/21  .

## 2021-05-19 ENCOUNTER — HOSPITAL ENCOUNTER (OUTPATIENT)
Dept: CARDIOLOGY | Facility: HOSPITAL | Age: 76
Discharge: HOME | End: 2021-05-19
Admitting: INTERNAL MEDICINE

## 2021-05-19 VITALS
WEIGHT: 249 LBS | SYSTOLIC BLOOD PRESSURE: 114 MMHG | HEIGHT: 71 IN | BODY MASS INDEX: 34.86 KG/M2 | DIASTOLIC BLOOD PRESSURE: 54 MMHG

## 2021-05-19 DIAGNOSIS — I20.8 ANGINA AT REST (HCC): ICD-10-CM

## 2021-05-19 LAB
BH CV ECHO MEAS - % IVS THICK: 63 %
BH CV ECHO MEAS - % LVPW THICK: 19.2 %
BH CV ECHO MEAS - ACS: 1.9 CM
BH CV ECHO MEAS - AO MAX PG (FULL): 2.3 MMHG
BH CV ECHO MEAS - AO MAX PG: 6.2 MMHG
BH CV ECHO MEAS - AO MEAN PG (FULL): 1.8 MMHG
BH CV ECHO MEAS - AO MEAN PG: 3.8 MMHG
BH CV ECHO MEAS - AO ROOT AREA (BSA CORRECTED): 1.6
BH CV ECHO MEAS - AO ROOT AREA: 10.8 CM^2
BH CV ECHO MEAS - AO ROOT DIAM: 3.7 CM
BH CV ECHO MEAS - AO V2 MAX: 124.1 CM/SEC
BH CV ECHO MEAS - AO V2 MEAN: 94.3 CM/SEC
BH CV ECHO MEAS - AO V2 VTI: 20.9 CM
BH CV ECHO MEAS - AVA(I,A): 3.4 CM^2
BH CV ECHO MEAS - AVA(I,D): 3.4 CM^2
BH CV ECHO MEAS - AVA(V,A): 3.1 CM^2
BH CV ECHO MEAS - AVA(V,D): 3.1 CM^2
BH CV ECHO MEAS - BSA(HAYCOCK): 2.4 M^2
BH CV ECHO MEAS - BSA: 2.3 M^2
BH CV ECHO MEAS - BZI_BMI: 34.7 KILOGRAMS/M^2
BH CV ECHO MEAS - BZI_METRIC_HEIGHT: 180.3 CM
BH CV ECHO MEAS - BZI_METRIC_WEIGHT: 112.9 KG
BH CV ECHO MEAS - EDV(CUBED): 52.7 ML
BH CV ECHO MEAS - EDV(MOD-SP4): 53.2 ML
BH CV ECHO MEAS - EDV(TEICH): 59.9 ML
BH CV ECHO MEAS - EF(CUBED): 76.3 %
BH CV ECHO MEAS - EF(MOD-SP4): 61.7 %
BH CV ECHO MEAS - EF(TEICH): 69.1 %
BH CV ECHO MEAS - ESV(CUBED): 12.5 ML
BH CV ECHO MEAS - ESV(MOD-SP4): 20.4 ML
BH CV ECHO MEAS - ESV(TEICH): 18.5 ML
BH CV ECHO MEAS - FS: 38.1 %
BH CV ECHO MEAS - IVS/LVPW: 1
BH CV ECHO MEAS - IVSD: 1.4 CM
BH CV ECHO MEAS - IVSS: 2.2 CM
BH CV ECHO MEAS - LV DIASTOLIC VOL/BSA (35-75): 23 ML/M^2
BH CV ECHO MEAS - LV MASS(C)D: 177.2 GRAMS
BH CV ECHO MEAS - LV MASS(C)DI: 76.5 GRAMS/M^2
BH CV ECHO MEAS - LV MASS(C)S: 179.7 GRAMS
BH CV ECHO MEAS - LV MASS(C)SI: 77.6 GRAMS/M^2
BH CV ECHO MEAS - LV MAX PG: 3.9 MMHG
BH CV ECHO MEAS - LV MEAN PG: 2 MMHG
BH CV ECHO MEAS - LV SYSTOLIC VOL/BSA (12-30): 8.8 ML/M^2
BH CV ECHO MEAS - LV V1 MAX: 98.5 CM/SEC
BH CV ECHO MEAS - LV V1 MEAN: 64.3 CM/SEC
BH CV ECHO MEAS - LV V1 VTI: 18.2 CM
BH CV ECHO MEAS - LVIDD: 3.7 CM
BH CV ECHO MEAS - LVIDS: 2.3 CM
BH CV ECHO MEAS - LVOT AREA: 3.9 CM^2
BH CV ECHO MEAS - LVOT DIAM: 2.2 CM
BH CV ECHO MEAS - LVPWD: 1.3 CM
BH CV ECHO MEAS - LVPWS: 1.6 CM
BH CV ECHO MEAS - MV A MAX VEL: 45.1 CM/SEC
BH CV ECHO MEAS - MV DEC SLOPE: 108.9 CM/SEC^2
BH CV ECHO MEAS - MV DEC TIME: 0.42 SEC
BH CV ECHO MEAS - MV E MAX VEL: 23.1 CM/SEC
BH CV ECHO MEAS - MV E/A: 0.51
BH CV ECHO MEAS - MV MAX PG: 2.9 MMHG
BH CV ECHO MEAS - MV MEAN PG: 1 MMHG
BH CV ECHO MEAS - MV V2 MAX: 85.6 CM/SEC
BH CV ECHO MEAS - MV V2 MEAN: 47.3 CM/SEC
BH CV ECHO MEAS - MV V2 VTI: 20.6 CM
BH CV ECHO MEAS - MVA(VTI): 3.4 CM^2
BH CV ECHO MEAS - PA ACC TIME: 0.07 SEC
BH CV ECHO MEAS - PA MAX PG (FULL): 0.16 MMHG
BH CV ECHO MEAS - PA MAX PG: 4.1 MMHG
BH CV ECHO MEAS - PA MEAN PG (FULL): 0.08 MMHG
BH CV ECHO MEAS - PA MEAN PG: 2 MMHG
BH CV ECHO MEAS - PA PR(ACCEL): 49.4 MMHG
BH CV ECHO MEAS - PA V2 MAX: 101.6 CM/SEC
BH CV ECHO MEAS - PA V2 MEAN: 64.1 CM/SEC
BH CV ECHO MEAS - PA V2 VTI: 14.9 CM
BH CV ECHO MEAS - RV MAX PG: 4 MMHG
BH CV ECHO MEAS - RV MEAN PG: 1.9 MMHG
BH CV ECHO MEAS - RV V1 MAX: 99.6 CM/SEC
BH CV ECHO MEAS - RV V1 MEAN: 62.1 CM/SEC
BH CV ECHO MEAS - RV V1 VTI: 14 CM
BH CV ECHO MEAS - RVDD: 3.3 CM
BH CV ECHO MEAS - SI(AO): 97.5 ML/M^2
BH CV ECHO MEAS - SI(CUBED): 17.3 ML/M^2
BH CV ECHO MEAS - SI(LVOT): 30.5 ML/M^2
BH CV ECHO MEAS - SI(MOD-SP4): 14.2 ML/M^2
BH CV ECHO MEAS - SI(TEICH): 17.9 ML/M^2
BH CV ECHO MEAS - SV(AO): 225.7 ML
BH CV ECHO MEAS - SV(CUBED): 40.2 ML
BH CV ECHO MEAS - SV(LVOT): 70.5 ML
BH CV ECHO MEAS - SV(MOD-SP4): 32.8 ML
BH CV ECHO MEAS - SV(TEICH): 41.4 ML
MAXIMAL PREDICTED HEART RATE: 144 BPM
STRESS TARGET HR: 122 BPM

## 2021-05-19 PROCEDURE — 93306 TTE W/DOPPLER COMPLETE: CPT

## 2021-05-19 PROCEDURE — 25010000002 SULFUR HEXAFLUORIDE MICROSPH 60.7-25 MG RECONSTITUTED SUSPENSION: Performed by: INTERNAL MEDICINE

## 2021-05-19 PROCEDURE — 93306 TTE W/DOPPLER COMPLETE: CPT | Performed by: INTERNAL MEDICINE

## 2021-05-19 RX ADMIN — SULFUR HEXAFLUORIDE 4 ML: KIT at 16:13

## 2021-05-21 ENCOUNTER — TELEPHONE (OUTPATIENT)
Dept: CARDIOLOGY | Facility: CLINIC | Age: 76
End: 2021-05-21

## 2021-05-21 NOTE — TELEPHONE ENCOUNTER
GUSTAVOOM that his echo was stable from last echo. Heart function on the low side of normal and no significant VHD. Deb

## 2021-08-18 ENCOUNTER — OFFICE VISIT (OUTPATIENT)
Dept: CARDIOLOGY | Facility: CLINIC | Age: 76
End: 2021-08-18

## 2021-08-18 VITALS
WEIGHT: 241 LBS | DIASTOLIC BLOOD PRESSURE: 70 MMHG | OXYGEN SATURATION: 96 % | HEART RATE: 69 BPM | HEIGHT: 71 IN | RESPIRATION RATE: 20 BRPM | SYSTOLIC BLOOD PRESSURE: 110 MMHG | BODY MASS INDEX: 33.74 KG/M2

## 2021-08-18 DIAGNOSIS — E78.2 MIXED HYPERLIPIDEMIA: ICD-10-CM

## 2021-08-18 DIAGNOSIS — I20.8 ANGINA AT REST (HCC): ICD-10-CM

## 2021-08-18 DIAGNOSIS — Z01.818 PRE-OP TESTING: ICD-10-CM

## 2021-08-18 DIAGNOSIS — I25.10 CAD S/P PERCUTANEOUS CORONARY ANGIOPLASTY: Primary | ICD-10-CM

## 2021-08-18 DIAGNOSIS — Z98.61 CAD S/P PERCUTANEOUS CORONARY ANGIOPLASTY: Primary | ICD-10-CM

## 2021-08-18 DIAGNOSIS — I10 ESSENTIAL HYPERTENSION: ICD-10-CM

## 2021-08-18 DIAGNOSIS — I20.0 UNSTABLE ANGINA (HCC): ICD-10-CM

## 2021-08-18 PROCEDURE — 99214 OFFICE O/P EST MOD 30 MIN: CPT | Performed by: INTERNAL MEDICINE

## 2021-08-18 NOTE — H&P (VIEW-ONLY)
Subjective:     Encounter Date:08/18/2021      Patient ID: Carroll Rodgers is a 76 y.o. male.    Chief Complaint:  Chief Complaint   Patient presents with   • Coronary Artery Disease   • Hypertension   • Hyperlipidemia       HPI:  Carroll is a very pleasant 75-year-old patient initially self-referred.  He is previously seen cardiologist in [facility].   He has known coronary artery disease and is status post coronary artery bypass grafting in 1996 with a LIMA to the LAD and SVG to the RCA and circumflex artery.  He subsequently had total occlusion of his SVG graft to the obtuse marginal branch and underwent stenting with a drug-eluting stent 7/1/2015 as well as his SVG graft to the right coronary artery.  Subsequently he developed exertional and rest angina with his worst symptom being exertional dyspnea.  Cardiac catheterization performed 1/30/2018 showed critical stenosis of the jailed segment of the inferior branch of the circumflex obtuse marginal branch with what appeared to be 70% in-stent restenosis of the superior parent limb of the circumflex into the superior limb.  He was told that medical therapy was his best option.  Since then he has had worsening angina on Ranexa and worsening dyspnea.    He came for second opinion.    I personally reviewed the cardiac catheterization images from 1/30/2018 and confirmed the above results.  In addition he has a patent LIMA to the LAD with a distal apical 80% narrowing that is inconsequential.  He has a chronic total occlusion of his SVG graft to the his right coronary artery and again an occlusion of his SVG to the OM.  The circumflex OM native vessel is as described above.  His ECG last visit in the office showed normal sinus rhythm with APCs and a old left bundle branch block.      His chest pain syndrome was that of exertional angina often if not always associated with dyspnea with chronic stable angina class III symptoms versus unstable angina given the  increase in frequency.  He does not have associated nausea or diaphoresis.    He underwent PCI and stenting of the circumflex artery 9/9/2019 with an excellent angiographic result with restoration of MNOIE-3 flow.  Since then he had done well.    He was recently diagnosed with new onset paroxysmal atrial fibrillation.  I personally reviewed his echo from 8/23/2019 which showed normal LV systolic function with diastolic grade 1A impaired relaxation normal RV function with no significant valvular heart disease.  He was loaded with amiodarone which chemically converted him to normal sinus rhythm.  He felt a little bit better since then. He had pulmonary function tests and thyroid function test sent.  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  He was therefore taken off amiodarone started on levothyroxine and maintained on metoprolol and Xarelto for anticoagulation.    Earlier this year he returned with some shortness of breath and chest tightness with extreme exertion.  I personally reviewed his echocardiogram from 2/28/2020 which shows normal LV systolic function with ejection fraction of 55 to 60% and underlying grade 1A diastolic dysfunction with no significant valvular heart disease.  He underwent cardiac catheterization which revealed critical in-stent restenosis of the superior limb of the obtuse marginal branch previously stented.  He underwent PCI and stenting 8/13/2020.    Unfortunately he returned after only 4 months with identical symptoms of chest tightness and shortness of breath.  I took him off of his Imdur because he was doing well.  I also took him off of Imdur and ranolazine because he was doing well.    Last visit his angina was unstable in its frequency, it is controlled with nitroglycerin.  It occurs only with exertion and does not occur at rest.  He has no associated diaphoresis or nausea.  We tried to optimize medical therapy for angina  pectoris.  We initiated antianginals as follows: Imdur 90 mg p.o. daily with ranolazine 1000 mg p.o. twice daily.  But [facility].    He returns with no improvement.    The following portions of the patient's history were reviewed and updated as appropriate: allergies, current medications, past family history, past medical history, past social history, past surgical history and problem list.    Problem List:  Patient Active Problem List   Diagnosis   • Type 2 diabetes mellitus without complication, with long-term current use of insulin (CMS/[facility])   • Mixed hyperlipidemia   • Essential hypertension   • Acquired hypothyroidism   • CAD S/P percutaneous coronary angioplasty   • New onset atrial fibrillation (CMS/[facility])   • Angina at rest (CMS/[facility])       Past Medical History:  Past Medical History:   Diagnosis Date   • Coronary artery disease    • Diabetes mellitus (CMS/[facility])    • Hyperlipidemia    • Hypertension    • Hypothyroidism    • New onset atrial fibrillation (CMS/[facility]) 1/11/2020   • Stroke (CMS/H[facility])     X2   • Unstable angina (CMS/[facility]) 8/22/2019       Past Surgical History:  Past Surgical History:   Procedure Laterality Date   • CARDIAC CATHETERIZATION  01/30/2018    EF 40%, Distal LM 50% stenosis, Proximal LM 50% stenosis, Proximal LAD 95% stenosis, Mid % stenosis, Apical LAD 70% stenosis, Significant ISR at bifurcation, 90% stenosis at circumflex portoin, 90% stenosis at ostial OM, distal circumflex 80% stenosis, Mid % stenosis, SVG to % occluded at ostium, SVG to OM branches 100% occluded, LIMA to LAD has no significant disease   • CARDIAC CATHETERIZATION N/A 9/9/2019    PCI to OM1 and OM2 of LCX using overlapping Xience LARISA' and Pato LARISA. PCI to LM into distal LM and ostial proximal circ using Xience LARISA. Laser atherectomy.   • CARDIAC CATHETERIZATION N/A 9/9/2019    Procedure: Atherectomy-coronary;  Surgeon: Gonzalez Ochoa MD;  Location:   "[facility] CATH INVASIVE LOCATION;  Service: Cardiology   • CARDIAC CATHETERIZATION N/A 8/13/2020    Procedure: Left Heart Cath;  Surgeon: Gonzalez Ochoa MD;  Location: [facility] CATH INVASIVE LOCATION;  Service: Cardiology;  Laterality: N/A;   • CARDIAC CATHETERIZATION N/A 8/13/2020    Procedure: Percutaneous Coronary Intervention;  Surgeon: Gonzalez Ochoa MD;  Location:  [facility] CATH INVASIVE LOCATION;  Service: Cardiology;  Laterality: N/A;  PCI  Left Main PTCA/LARISA  PCI Circx/OM  PTCA/LARISA   • CORONARY ARTERY BYPASS GRAFT  1996    X3   • CORONARY STENT PLACEMENT  07/01/2015    Xience Alpine LARISA to RCA (X2) and Mid Circ (X1)-Dr. Rodriguez   • CORONARY STENT PLACEMENT  2006   • CORONARY STENT PLACEMENT  2010       Social History:  Social History     Socioeconomic History   • Marital status:      Spouse name: Not on file   • Number of children: Not on file   • Years of education: Not on file   • Highest education level: Not on file   Tobacco Use   • Smoking status: Former Smoker   • Smokeless tobacco: Never Used   • Tobacco comment: 40 years ago   Substance and Sexual Activity   • Alcohol use: Yes   • Drug use: No   • Sexual activity: Defer       Allergies:  Allergies   Allergen Reactions   • Peanut-Containing Drug Products            Review of Symptoms:  Constitutional: Patient afebrile no chills or unexpected weight changes  Respiratory: No cough, no wheezing or dyspnea  Cardiovascular: Today the patient complains of worsening chest pain, without palpitations, dyspnea, orthopnea and no edema  Gastrointestinal: No nausea, vomiting, constipation or diarrhea.  No melena or dark stools    All other systems reviewed and are negative           Objective:         /70 (BP Location: Left arm, Patient Position: Sitting, Cuff Size: Large Adult)   Pulse 69   Resp 20   Ht 180.3 cm (71\")   Wt 109 kg (241 lb)   SpO2 96%   BMI 33.61 kg/m²       Physical exam  Constitutional: well-nourished, and " appears stated age in no acute distress  PERRL: Conjunctiva clear, no pallor, anicteric  HENMT: normocephalic, normal dentition, no cyanosis or pallor  Neck:no bruits, or thrills and bilateral normal carotid upstroke. Normal jugular venous pressure  Cardiovascular: No parasternal heaves an non-displaced focal PMI. Normal rate and rhythm: no rub, gallop, murmur or click and normal S1 and S2; no lower or upper extremity edema.   Lungs: unlabored, no wheezing with no rales or rhonchi on auscultation.  Extremities: Warm, no clubbing, cyanosis. Full and equal peripheral pulses in extremities with no bruits appreciated.   Abdomen: soft, non-tender, non-distended  Musculoskeletal: no joint tenderness or swelling and no erythema  Skin: Warm and dry, non-erythematous   Neuro:alert and normal affect. Oriented to time, place and person.       In-Office Procedure(s):  Procedures    ASCVD RIsk Score::  The ASCVD Risk score (Mansfieldmerrick RODRIGUEZ Jr., et al., 2013) failed to calculate for the following reasons:    Cannot find a previous HDL lab    Cannot find a previous total cholesterol lab    Recent Radiology:  Imaging Results (Most Recent)     None          Lab Review:   No visits with results within 2 Month(s) from this visit.   Latest known visit with results is:   Hospital Outpatient Visit on 05/19/2021   Component Date Value   • BSA 05/19/2021 2.3    • RVIDd 05/19/2021 3.3    • IVSd 05/19/2021 1.4    • IVSs 05/19/2021 2.2    • LVIDd 05/19/2021 3.7    • LVIDs 05/19/2021 2.3    • LVPWd 05/19/2021 1.3    • BH CV ECHO BREONNA - LVPWS 05/19/2021 1.6    • IVS/LVPW 05/19/2021 1.0    • FS 05/19/2021 38.1    • EDV(Teich) 05/19/2021 59.9    • ESV(Teich) 05/19/2021 18.5    • EF(Teich) 05/19/2021 69.1    • EDV(cubed) 05/19/2021 52.7    • ESV(cubed) 05/19/2021 12.5    • EF(cubed) 05/19/2021 76.3    • % IVS thick 05/19/2021 63.0    • % LVPW thick 05/19/2021 19.2    • LV mass(C)d 05/19/2021 177.2    • LV mass(C)dI 05/19/2021 76.5    • LV mass(C)s  05/19/2021 179.7    • LV mass(C)sI 05/19/2021 77.6    • SV(Teich) 05/19/2021 41.4    • SI(Teich) 05/19/2021 17.9    • SV(cubed) 05/19/2021 40.2    • SI(cubed) 05/19/2021 17.3    • Ao root diam 05/19/2021 3.7    • Ao root area 05/19/2021 10.8    • ACS 05/19/2021 1.9    • LVOT diam 05/19/2021 2.2    • LVOT area 05/19/2021 3.9    • EDV(MOD-sp4) 05/19/2021 53.2    • ESV(MOD-sp4) 05/19/2021 20.4    • EF(MOD-sp4) 05/19/2021 61.7    • SV(MOD-sp4) 05/19/2021 32.8    • SI(MOD-sp4) 05/19/2021 14.2    • Ao root area (BSA correc* 05/19/2021 1.6    • LV Dsouza Vol (BSA correct* 05/19/2021 23.0    • LV Sys Vol (BSA correcte* 05/19/2021 8.8    • MV E max alber 05/19/2021 23.1    • MV A max alber 05/19/2021 45.1    • MV E/A 05/19/2021 0.51    • MV V2 max 05/19/2021 85.6    • MV max PG 05/19/2021 2.9    • MV V2 mean 05/19/2021 47.3    • MV mean PG 05/19/2021 1.0    • MV V2 VTI 05/19/2021 20.6    • MVA(VTI) 05/19/2021 3.4    • MV dec slope 05/19/2021 108.9    • MV dec time 05/19/2021 0.42    • Ao pk alber 05/19/2021 124.1    • Ao max PG 05/19/2021 6.2    • Ao max PG (full) 05/19/2021 2.3    • Ao V2 mean 05/19/2021 94.3    • Ao mean PG 05/19/2021 3.8    • Ao mean PG (full) 05/19/2021 1.8    • Ao V2 VTI 05/19/2021 20.9    • TERRIE(I,A) 05/19/2021 3.4    • TERRIE(I,D) 05/19/2021 3.4    • TERRIE(V,A) 05/19/2021 3.1    • TERRIE(V,D) 05/19/2021 3.1    • LV V1 max PG 05/19/2021 3.9    • LV V1 mean PG 05/19/2021 2.0    • LV V1 max 05/19/2021 98.5    • LV V1 mean 05/19/2021 64.3    • LV V1 VTI 05/19/2021 18.2    • SV(Ao) 05/19/2021 225.7    • SI(Ao) 05/19/2021 97.5    • SV(LVOT) 05/19/2021 70.5    • SI(LVOT) 05/19/2021 30.5    • PA V2 max 05/19/2021 101.6    • PA max PG 05/19/2021 4.1    • PA max PG (full) 05/19/2021 0.16    • PA V2 mean 05/19/2021 64.1    • PA mean PG 05/19/2021 2.0    • PA mean PG (full) 05/19/2021 0.08    • PA V2 VTI 05/19/2021 14.9    • PA acc time 05/19/2021 0.07    • RV V1 max PG 05/19/2021 4.0    • RV V1 mean PG 05/19/2021 1.9    • RV V1  max 05/19/2021 99.6    • RV V1 mean 05/19/2021 62.1    • RV V1 VTI 05/19/2021 14.0    • PA pr(Accel) 05/19/2021 49.4    • BH CV ECHO BREONNA - BZI_BMI 05/19/2021 34.7    • BH CV ECHO BREONNA - BSA(HA* 05/19/2021 2.4    • BH CV ECHO BREONNA - BZI_ME* 05/19/2021 112.9    • BH CV ECHO BREONNA - BZI_ME* 05/19/2021 180.3    • Target HR (85%) 05/19/2021 122    • Max. Pred. HR (100%) 05/19/2021 144               Invalid input(s): ALKPO4                        Invalid input(s): LDLCALC                Assessment:          Diagnosis Plan   1. CAD S/P percutaneous coronary angioplasty     2. Angina at rest (CMS/HCC)     3. Essential hypertension     4. Mixed hyperlipidemia            Plan:         1. CAD S/P percutaneous coronary angioplasty  Refractory to optimized antianginals.  Continue medical therapy secondary prevention    2. Angina at rest (CMS/HCC)  Going to repeat cardiac catheterization in search of identifiable and revascularizable culprits nevertheless continue antianginals.    3. Essential hypertension  Well-controlled on medical therapy    4. Mixed hyperlipidemia  Continue medical therapy.           Gonzalez Ochoa MD  08/18/21  .

## 2021-08-18 NOTE — PROGRESS NOTES
Subjective:     Encounter Date:08/18/2021      Patient ID: Carroll Rodgers is a 76 y.o. male.    Chief Complaint:  Chief Complaint   Patient presents with   • Coronary Artery Disease   • Hypertension   • Hyperlipidemia       HPI:  Carroll is a very pleasant 75-year-old patient initially self-referred.  He is previously seen cardiologist in [facility].   He has known coronary artery disease and is status post coronary artery bypass grafting in 1996 with a LIMA to the LAD and SVG to the RCA and circumflex artery.  He subsequently had total occlusion of his SVG graft to the obtuse marginal branch and underwent stenting with a drug-eluting stent 7/1/2015 as well as his SVG graft to the right coronary artery.  Subsequently he developed exertional and rest angina with his worst symptom being exertional dyspnea.  Cardiac catheterization performed 1/30/2018 showed critical stenosis of the jailed segment of the inferior branch of the circumflex obtuse marginal branch with what appeared to be 70% in-stent restenosis of the superior parent limb of the circumflex into the superior limb.  He was told that medical therapy was his best option.  Since then he has had worsening angina on Ranexa and worsening dyspnea.    He came for second opinion.    I personally reviewed the cardiac catheterization images from 1/30/2018 and confirmed the above results.  In addition he has a patent LIMA to the LAD with a distal apical 80% narrowing that is inconsequential.  He has a chronic total occlusion of his SVG graft to the his right coronary artery and again an occlusion of his SVG to the OM.  The circumflex OM native vessel is as described above.  His ECG last visit in the office showed normal sinus rhythm with APCs and a old left bundle branch block.      His chest pain syndrome was that of exertional angina often if not always associated with dyspnea with chronic stable angina class III symptoms versus unstable angina given the  increase in frequency.  He does not have associated nausea or diaphoresis.    He underwent PCI and stenting of the circumflex artery 9/9/2019 with an excellent angiographic result with restoration of MONIE-3 flow.  Since then he had done well.    He was recently diagnosed with new onset paroxysmal atrial fibrillation.  I personally reviewed his echo from 8/23/2019 which showed normal LV systolic function with diastolic grade 1A impaired relaxation normal RV function with no significant valvular heart disease.  He was loaded with amiodarone which chemically converted him to normal sinus rhythm.  He felt a little bit better since then. He had pulmonary function tests and thyroid function test sent.  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  He was therefore taken off amiodarone started on levothyroxine and maintained on metoprolol and Xarelto for anticoagulation.    Earlier this year he returned with some shortness of breath and chest tightness with extreme exertion.  I personally reviewed his echocardiogram from 2/28/2020 which shows normal LV systolic function with ejection fraction of 55 to 60% and underlying grade 1A diastolic dysfunction with no significant valvular heart disease.  He underwent cardiac catheterization which revealed critical in-stent restenosis of the superior limb of the obtuse marginal branch previously stented.  He underwent PCI and stenting 8/13/2020.    Unfortunately he returned after only 4 months with identical symptoms of chest tightness and shortness of breath.  I took him off of his Imdur because he was doing well.  I also took him off of Imdur and ranolazine because he was doing well.    Last visit his angina was unstable in its frequency, it is controlled with nitroglycerin.  It occurs only with exertion and does not occur at rest.  He has no associated diaphoresis or nausea.  We tried to optimize medical therapy for angina  pectoris.  We initiated antianginals as follows: Imdur 90 mg p.o. daily with ranolazine 1000 mg p.o. twice daily.  But [facility].    He returns with no improvement.    The following portions of the patient's history were reviewed and updated as appropriate: allergies, current medications, past family history, past medical history, past social history, past surgical history and problem list.    Problem List:  Patient Active Problem List   Diagnosis   • Type 2 diabetes mellitus without complication, with long-term current use of insulin (CMS/[facility])   • Mixed hyperlipidemia   • Essential hypertension   • Acquired hypothyroidism   • CAD S/P percutaneous coronary angioplasty   • New onset atrial fibrillation (CMS/[facility])   • Angina at rest (CMS/[facility])       Past Medical History:  Past Medical History:   Diagnosis Date   • Coronary artery disease    • Diabetes mellitus (CMS/[facility])    • Hyperlipidemia    • Hypertension    • Hypothyroidism    • New onset atrial fibrillation (CMS/[facility]) 1/11/2020   • Stroke (CMS/HCC)     X2   • Unstable angina (CMS/HCC) 8/22/2019       Past Surgical History:  Past Surgical History:   Procedure Laterality Date   • CARDIAC CATHETERIZATION  01/30/2018    EF 40%, Distal LM 50% stenosis, Proximal LM 50% stenosis, Proximal LAD 95% stenosis, Mid % stenosis, Apical LAD 70% stenosis, Significant ISR at bifurcation, 90% stenosis at circumflex portoin, 90% stenosis at ostial OM, distal circumflex 80% stenosis, Mid % stenosis, SVG to % occluded at ostium, SVG to OM branches 100% occluded, LIMA to LAD has no significant disease   • CARDIAC CATHETERIZATION N/A 9/9/2019    PCI to OM1 and OM2 of LCX using overlapping Xience LARISA' and Pato LARISA. PCI to LM into distal LM and ostial proximal circ using Xience LARISA. Laser atherectomy.   • CARDIAC CATHETERIZATION N/A 9/9/2019    Procedure: Atherectomy-coronary;  Surgeon: Gonzalez Ochoa MD;  Location:   "[facility] CATH INVASIVE LOCATION;  Service: Cardiology   • CARDIAC CATHETERIZATION N/A 8/13/2020    Procedure: Left Heart Cath;  Surgeon: Gonzalez Ochoa MD;  Location: [facility]VAS[facility];  Service: Cardiology;  Laterality: N/A;   • CARDIAC CATHETERIZATION N/A 8/13/2020    Procedure: Percutaneous Coronary Intervention;  Surgeon: Gonzalez Ochoa MD;  Location:  [facility]ATH [facility]VAS[facility];  Service: Cardiology;  Laterality: N/A;  PCI  Left Main PTCA/LARISA  PCI Circx/OM  PTCA/LARISA   • CORONARY ARTERY BYPASS GRAFT  1996    X3   • CORONARY STENT PLACEMENT  07/01/2015    Xience [place] LARISA to RCA (X2) and Mid Circ (X1)-Dr. Rodriguez   • CORONARY STENT PLACEMENT  2006   • CORONARY STENT PLACEMENT  2010       Social History:  Social History     Socioeconomic History   • Marital status:      Spouse name: Not on file   • Number of children: Not on file   • Years of education: Not on file   • Highest education level: Not on file   Tobacco Use   • Smoking status: Former Smoker   • Smokeless tobacco: Never Used   • Tobacco comment: 40 years ago   Substance and Sexual Activity   • Alcohol use: Yes   • Drug use: No   • Sexual activity: Defer       Allergies:  Allergies   Allergen Reactions   • Peanut-Containing Drug Products            Review of Symptoms:  Constitutional: Patient afebrile no chills or unexpected weight changes  Respiratory: No cough, no wheezing or dyspnea  Cardiovascular: Today the patient complains of worsening chest pain, without palpitations, dyspnea, orthopnea and no edema  Gastrointestinal: No nausea, vomiting, constipation or diarrhea.  No melena or dark stools    All other systems reviewed and are negative           Objective:         /70 (BP Location: Left arm, Patient Position: Sitting, Cuff Size: Large Adult)   Pulse 69   Resp 20   Ht 180.3 cm (71\")   Wt 109 kg (241 lb)   SpO2 96%   BMI 33.61 kg/m²       Physical exam  Constitutional: well-nourished, and " appears stated age in no acute distress  PERRL: Conjunctiva clear, no pallor, anicteric  HENMT: normocephalic, normal dentition, no cyanosis or pallor  Neck:no bruits, or thrills and bilateral normal carotid upstroke. Normal jugular venous pressure  Cardiovascular: No parasternal heaves an non-displaced focal PMI. Normal rate and rhythm: no rub, gallop, murmur or click and normal S1 and S2; no lower or upper extremity edema.   Lungs: unlabored, no wheezing with no rales or rhonchi on auscultation.  Extremities: Warm, no clubbing, cyanosis. Full and equal peripheral pulses in extremities with no bruits appreciated.   Abdomen: soft, non-tender, non-distended  Musculoskeletal: no joint tenderness or swelling and no erythema  Skin: Warm and dry, non-erythematous   Neuro:alert and normal affect. Oriented to time, place and person.       In-Office Procedure(s):  Procedures    ASCVD RIsk Score::  The ASCVD Risk score (Makotimerrick RODRIGUEZ Jr., et al., 2013) failed to calculate for the following reasons:    Cannot find a previous HDL lab    Cannot find a previous total cholesterol lab    Recent Radiology:  Imaging Results (Most Recent)     None          Lab Review:   No visits with results within 2 Month(s) from this visit.   Latest known visit with results is:   Hospital Outpatient Visit on 05/19/2021   Component Date Value   • BSA 05/19/2021 2.3    • RVIDd 05/19/2021 3.3    • IVSd 05/19/2021 1.4    • IVSs 05/19/2021 2.2    • LVIDd 05/19/2021 3.7    • LVIDs 05/19/2021 2.3    • LVPWd 05/19/2021 1.3    • BH CV ECHO BREONNA - LVPWS 05/19/2021 1.6    • IVS/LVPW 05/19/2021 1.0    • FS 05/19/2021 38.1    • EDV(Teich) 05/19/2021 59.9    • ESV(Teich) 05/19/2021 18.5    • EF(Teich) 05/19/2021 69.1    • EDV(cubed) 05/19/2021 52.7    • ESV(cubed) 05/19/2021 12.5    • EF(cubed) 05/19/2021 76.3    • % IVS thick 05/19/2021 63.0    • % LVPW thick 05/19/2021 19.2    • LV mass(C)d 05/19/2021 177.2    • LV mass(C)dI 05/19/2021 76.5    • LV mass(C)s  05/19/2021 179.7    • LV mass(C)sI 05/19/2021 77.6    • SV(Teich) 05/19/2021 41.4    • SI(Teich) 05/19/2021 17.9    • SV(cubed) 05/19/2021 40.2    • SI(cubed) 05/19/2021 17.3    • Ao root diam 05/19/2021 3.7    • Ao root area 05/19/2021 10.8    • ACS 05/19/2021 1.9    • LVOT diam 05/19/2021 2.2    • LVOT area 05/19/2021 3.9    • EDV(MOD-sp4) 05/19/2021 53.2    • ESV(MOD-sp4) 05/19/2021 20.4    • EF(MOD-sp4) 05/19/2021 61.7    • SV(MOD-sp4) 05/19/2021 32.8    • SI(MOD-sp4) 05/19/2021 14.2    • Ao root area (BSA correc* 05/19/2021 1.6    • LV Dsouza Vol (BSA correct* 05/19/2021 23.0    • LV Sys Vol (BSA correcte* 05/19/2021 8.8    • MV E max alber 05/19/2021 23.1    • MV A max alber 05/19/2021 45.1    • MV E/A 05/19/2021 0.51    • MV V2 max 05/19/2021 85.6    • MV max PG 05/19/2021 2.9    • MV V2 mean 05/19/2021 47.3    • MV mean PG 05/19/2021 1.0    • MV V2 VTI 05/19/2021 20.6    • MVA(VTI) 05/19/2021 3.4    • MV dec slope 05/19/2021 108.9    • MV dec time 05/19/2021 0.42    • Ao pk alber 05/19/2021 124.1    • Ao max PG 05/19/2021 6.2    • Ao max PG (full) 05/19/2021 2.3    • Ao V2 mean 05/19/2021 94.3    • Ao mean PG 05/19/2021 3.8    • Ao mean PG (full) 05/19/2021 1.8    • Ao V2 VTI 05/19/2021 20.9    • TERRIE(I,A) 05/19/2021 3.4    • TERRIE(I,D) 05/19/2021 3.4    • TERRIE(V,A) 05/19/2021 3.1    • TERRIE(V,D) 05/19/2021 3.1    • LV V1 max PG 05/19/2021 3.9    • LV V1 mean PG 05/19/2021 2.0    • LV V1 max 05/19/2021 98.5    • LV V1 mean 05/19/2021 64.3    • LV V1 VTI 05/19/2021 18.2    • SV(Ao) 05/19/2021 225.7    • SI(Ao) 05/19/2021 97.5    • SV(LVOT) 05/19/2021 70.5    • SI(LVOT) 05/19/2021 30.5    • PA V2 max 05/19/2021 101.6    • PA max PG 05/19/2021 4.1    • PA max PG (full) 05/19/2021 0.16    • PA V2 mean 05/19/2021 64.1    • PA mean PG 05/19/2021 2.0    • PA mean PG (full) 05/19/2021 0.08    • PA V2 VTI 05/19/2021 14.9    • PA acc time 05/19/2021 0.07    • RV V1 max PG 05/19/2021 4.0    • RV V1 mean PG 05/19/2021 1.9    • RV V1  max 05/19/2021 99.6    • RV V1 mean 05/19/2021 62.1    • RV V1 VTI 05/19/2021 14.0    • PA pr(Accel) 05/19/2021 49.4    • BH CV ECHO BREONNA - BZI_BMI 05/19/2021 34.7    • BH CV ECHO BREONNA - BSA(HA* 05/19/2021 2.4    • BH CV ECHO BREONNA - BZI_ME* 05/19/2021 112.9    • BH CV ECHO BREONNA - BZI_ME* 05/19/2021 180.3    • Target HR (85%) 05/19/2021 122    • Max. Pred. HR (100%) 05/19/2021 144               Invalid input(s): ALKPO4                        Invalid input(s): LDLCALC                Assessment:          Diagnosis Plan   1. CAD S/P percutaneous coronary angioplasty     2. Angina at rest (CMS/HCC)     3. Essential hypertension     4. Mixed hyperlipidemia            Plan:         1. CAD S/P percutaneous coronary angioplasty  Refractory to optimized antianginals.  Continue medical therapy secondary prevention    2. Angina at rest (CMS/HCC)  Going to repeat cardiac catheterization in search of identifiable and revascularizable culprits nevertheless continue antianginals.    3. Essential hypertension  Well-controlled on medical therapy    4. Mixed hyperlipidemia  Continue medical therapy.           Gonzalez Ochoa MD  08/18/21  .

## 2021-08-23 ENCOUNTER — LAB (OUTPATIENT)
Dept: LAB | Facility: HOSPITAL | Age: 76
End: 2021-08-23

## 2021-08-23 DIAGNOSIS — Z01.818 PRE-OP TESTING: ICD-10-CM

## 2021-08-23 DIAGNOSIS — I25.10 CAD S/P PERCUTANEOUS CORONARY ANGIOPLASTY: ICD-10-CM

## 2021-08-23 DIAGNOSIS — Z98.61 CAD S/P PERCUTANEOUS CORONARY ANGIOPLASTY: ICD-10-CM

## 2021-08-23 DIAGNOSIS — I20.0 UNSTABLE ANGINA (HCC): ICD-10-CM

## 2021-08-23 LAB
ALBUMIN SERPL-MCNC: 4.2 G/DL (ref 3.5–5.2)
ALBUMIN/GLOB SERPL: 1.4 G/DL
ALP SERPL-CCNC: 140 U/L (ref 39–117)
ALT SERPL W P-5'-P-CCNC: 9 U/L (ref 1–41)
ANION GAP SERPL CALCULATED.3IONS-SCNC: 11.8 MMOL/L (ref 5–15)
AST SERPL-CCNC: 8 U/L (ref 1–40)
BASOPHILS # BLD AUTO: 0.04 10*3/MM3 (ref 0–0.2)
BASOPHILS NFR BLD AUTO: 0.7 % (ref 0–1.5)
BILIRUB SERPL-MCNC: 0.2 MG/DL (ref 0–1.2)
BUN SERPL-MCNC: 25 MG/DL (ref 8–23)
BUN/CREAT SERPL: 14.2 (ref 7–25)
CALCIUM SPEC-SCNC: 9.2 MG/DL (ref 8.6–10.5)
CHLORIDE SERPL-SCNC: 100 MMOL/L (ref 98–107)
CO2 SERPL-SCNC: 24.2 MMOL/L (ref 22–29)
CREAT SERPL-MCNC: 1.76 MG/DL (ref 0.76–1.27)
DEPRECATED RDW RBC AUTO: 40.3 FL (ref 37–54)
EOSINOPHIL # BLD AUTO: 0.16 10*3/MM3 (ref 0–0.4)
EOSINOPHIL NFR BLD AUTO: 2.8 % (ref 0.3–6.2)
ERYTHROCYTE [DISTWIDTH] IN BLOOD BY AUTOMATED COUNT: 12.6 % (ref 12.3–15.4)
GFR SERPL CREATININE-BSD FRML MDRD: 38 ML/MIN/1.73
GLOBULIN UR ELPH-MCNC: 3.1 GM/DL
GLUCOSE SERPL-MCNC: 102 MG/DL (ref 65–99)
HCT VFR BLD AUTO: 41.5 % (ref 37.5–51)
HGB BLD-MCNC: 14.4 G/DL (ref 13–17.7)
IMM GRANULOCYTES # BLD AUTO: 0.01 10*3/MM3 (ref 0–0.05)
IMM GRANULOCYTES NFR BLD AUTO: 0.2 % (ref 0–0.5)
INR PPP: 1.04 (ref 0.93–1.1)
LYMPHOCYTES # BLD AUTO: 1.43 10*3/MM3 (ref 0.7–3.1)
LYMPHOCYTES NFR BLD AUTO: 25.2 % (ref 19.6–45.3)
MCH RBC QN AUTO: 30.9 PG (ref 26.6–33)
MCHC RBC AUTO-ENTMCNC: 34.7 G/DL (ref 31.5–35.7)
MCV RBC AUTO: 89.1 FL (ref 79–97)
MONOCYTES # BLD AUTO: 0.74 10*3/MM3 (ref 0.1–0.9)
MONOCYTES NFR BLD AUTO: 13.1 % (ref 5–12)
NEUTROPHILS NFR BLD AUTO: 3.29 10*3/MM3 (ref 1.7–7)
NEUTROPHILS NFR BLD AUTO: 58 % (ref 42.7–76)
NRBC BLD AUTO-RTO: 0 /100 WBC (ref 0–0.2)
PLATELET # BLD AUTO: 205 10*3/MM3 (ref 140–450)
PMV BLD AUTO: 11.1 FL (ref 6–12)
POTASSIUM SERPL-SCNC: 4.7 MMOL/L (ref 3.5–5.2)
PROT SERPL-MCNC: 7.3 G/DL (ref 6–8.5)
PROTHROMBIN TIME: 11.5 SECONDS (ref 9.6–11.7)
RBC # BLD AUTO: 4.66 10*6/MM3 (ref 4.14–5.8)
SODIUM SERPL-SCNC: 136 MMOL/L (ref 136–145)
WBC # BLD AUTO: 5.67 10*3/MM3 (ref 3.4–10.8)

## 2021-08-23 PROCEDURE — 80053 COMPREHEN METABOLIC PANEL: CPT

## 2021-08-23 PROCEDURE — 85610 PROTHROMBIN TIME: CPT

## 2021-08-23 PROCEDURE — 36415 COLL VENOUS BLD VENIPUNCTURE: CPT

## 2021-08-23 PROCEDURE — 85025 COMPLETE CBC W/AUTO DIFF WBC: CPT

## 2021-08-23 RX ORDER — ISOSORBIDE MONONITRATE 120 MG/1
120 TABLET, EXTENDED RELEASE ORAL DAILY
COMMUNITY

## 2021-08-23 RX ORDER — LISINOPRIL AND HYDROCHLOROTHIAZIDE 20; 12.5 MG/1; MG/1
1 TABLET ORAL DAILY
COMMUNITY

## 2021-08-26 ENCOUNTER — HOSPITAL ENCOUNTER (OUTPATIENT)
Facility: HOSPITAL | Age: 76
Discharge: HOME | End: 2021-08-27
Attending: INTERNAL MEDICINE | Admitting: INTERNAL MEDICINE

## 2021-08-26 ENCOUNTER — LAB (OUTPATIENT)
Dept: LAB | Facility: HOSPITAL | Age: 76
End: 2021-08-26

## 2021-08-26 DIAGNOSIS — I20.0 UNSTABLE ANGINA (HCC): ICD-10-CM

## 2021-08-26 DIAGNOSIS — I20.8 ANGINA AT REST (HCC): ICD-10-CM

## 2021-08-26 DIAGNOSIS — Z98.61 CAD S/P PERCUTANEOUS CORONARY ANGIOPLASTY: ICD-10-CM

## 2021-08-26 DIAGNOSIS — I20.8 ANGINA AT REST (HCC): Primary | ICD-10-CM

## 2021-08-26 DIAGNOSIS — I25.10 CAD S/P PERCUTANEOUS CORONARY ANGIOPLASTY: ICD-10-CM

## 2021-08-26 LAB
ACT BLD: 235 SECONDS (ref 89–137)
ACT BLD: 307 SECONDS (ref 89–137)
ACT BLD: 307 SECONDS (ref 89–137)
GLUCOSE BLDC GLUCOMTR-MCNC: 225 MG/DL (ref 70–105)
GLUCOSE BLDC GLUCOMTR-MCNC: 340 MG/DL (ref 70–105)
GLUCOSE BLDC GLUCOMTR-MCNC: 405 MG/DL (ref 70–105)
SARS-COV-2 RNA PNL SPEC NAA+PROBE: NOT DETECTED

## 2021-08-26 PROCEDURE — C1725 CATH, TRANSLUMIN NON-LASER: HCPCS | Performed by: INTERNAL MEDICINE

## 2021-08-26 PROCEDURE — 63710000001 ATORVASTATIN 40 MG TABLET: Performed by: INTERNAL MEDICINE

## 2021-08-26 PROCEDURE — C1874 STENT, COATED/COV W/DEL SYS: HCPCS | Performed by: INTERNAL MEDICINE

## 2021-08-26 PROCEDURE — 99152 MOD SED SAME PHYS/QHP 5/>YRS: CPT | Performed by: INTERNAL MEDICINE

## 2021-08-26 PROCEDURE — 63710000001 APIXABAN 5 MG TABLET: Performed by: INTERNAL MEDICINE

## 2021-08-26 PROCEDURE — 25010000002 FENTANYL CITRATE (PF) 100 MCG/2ML SOLUTION: Performed by: INTERNAL MEDICINE

## 2021-08-26 PROCEDURE — C1887 CATHETER, GUIDING: HCPCS | Performed by: INTERNAL MEDICINE

## 2021-08-26 PROCEDURE — A9270 NON-COVERED ITEM OR SERVICE: HCPCS | Performed by: INTERNAL MEDICINE

## 2021-08-26 PROCEDURE — G0378 HOSPITAL OBSERVATION PER HR: HCPCS

## 2021-08-26 PROCEDURE — 25010000002 HEPARIN (PORCINE) PER 1000 UNITS: Performed by: INTERNAL MEDICINE

## 2021-08-26 PROCEDURE — 93459 L HRT ART/GRFT ANGIO: CPT | Performed by: INTERNAL MEDICINE

## 2021-08-26 PROCEDURE — 63710000001 RANOLAZINE 500 MG TABLET SUSTAINED-RELEASE 12 HOUR: Performed by: INTERNAL MEDICINE

## 2021-08-26 PROCEDURE — C9607 PERC D-E COR REVASC CHRO SIN: HCPCS | Performed by: INTERNAL MEDICINE

## 2021-08-26 PROCEDURE — C1894 INTRO/SHEATH, NON-LASER: HCPCS | Performed by: INTERNAL MEDICINE

## 2021-08-26 PROCEDURE — C9803 HOPD COVID-19 SPEC COLLECT: HCPCS

## 2021-08-26 PROCEDURE — 82962 GLUCOSE BLOOD TEST: CPT

## 2021-08-26 PROCEDURE — 63710000001 INSULIN ISOPHANE HUMAN PER 5 UNITS: Performed by: INTERNAL MEDICINE

## 2021-08-26 PROCEDURE — C1769 GUIDE WIRE: HCPCS | Performed by: INTERNAL MEDICINE

## 2021-08-26 PROCEDURE — 92928 PRQ TCAT PLMT NTRAC ST 1 LES: CPT | Performed by: INTERNAL MEDICINE

## 2021-08-26 PROCEDURE — 87635 SARS-COV-2 COVID-19 AMP PRB: CPT

## 2021-08-26 PROCEDURE — C1885 CATH, TRANSLUMIN ANGIO LASER: HCPCS | Performed by: INTERNAL MEDICINE

## 2021-08-26 PROCEDURE — 99153 MOD SED SAME PHYS/QHP EA: CPT | Performed by: INTERNAL MEDICINE

## 2021-08-26 PROCEDURE — 25010000002 MIDAZOLAM PER 1 MG: Performed by: INTERNAL MEDICINE

## 2021-08-26 PROCEDURE — 63710000001 INSULIN LISPRO (HUMAN) PER 5 UNITS: Performed by: INTERNAL MEDICINE

## 2021-08-26 PROCEDURE — C9600 PERC DRUG-EL COR STENT SING: HCPCS | Performed by: INTERNAL MEDICINE

## 2021-08-26 PROCEDURE — 92943 PRQ TRLUML REVSC CH OCC ANT: CPT | Performed by: INTERNAL MEDICINE

## 2021-08-26 PROCEDURE — 63710000001 CHOLECALCIFEROL 25 MCG (1000 UT) TABLET: Performed by: INTERNAL MEDICINE

## 2021-08-26 PROCEDURE — 85347 COAGULATION TIME ACTIVATED: CPT

## 2021-08-26 PROCEDURE — 63710000001 ASPIRIN 325 MG TABLET: Performed by: INTERNAL MEDICINE

## 2021-08-26 PROCEDURE — C1760 CLOSURE DEV, VASC: HCPCS | Performed by: INTERNAL MEDICINE

## 2021-08-26 PROCEDURE — 63710000001 CLOPIDOGREL 75 MG TABLET: Performed by: INTERNAL MEDICINE

## 2021-08-26 PROCEDURE — 0 IOPAMIDOL PER 1 ML: Performed by: INTERNAL MEDICINE

## 2021-08-26 DEVICE — XIENCE SIERRA™ EVEROLIMUS ELUTING CORONARY STENT SYSTEM 2.25 MM X 38 MM / RAPID-EXCHANGE
Type: IMPLANTABLE DEVICE | Site: CORONARY | Status: FUNCTIONAL
Brand: XIENCE SIERRA™

## 2021-08-26 DEVICE — XIENCE SIERRA™ EVEROLIMUS ELUTING CORONARY STENT SYSTEM 3.00 MM X 23 MM / RAPID-EXCHANGE
Type: IMPLANTABLE DEVICE | Site: CORONARY | Status: FUNCTIONAL
Brand: XIENCE SIERRA™

## 2021-08-26 RX ORDER — ASPIRIN 325 MG
TABLET ORAL AS NEEDED
Status: DISCONTINUED | OUTPATIENT
Start: 2021-08-26 | End: 2021-08-26 | Stop reason: HOSPADM

## 2021-08-26 RX ORDER — MELATONIN
2000 NIGHTLY
Status: DISCONTINUED | OUTPATIENT
Start: 2021-08-26 | End: 2021-08-27 | Stop reason: HOSPADM

## 2021-08-26 RX ORDER — NITROGLYCERIN 5 MG/ML
INJECTION, SOLUTION INTRAVENOUS AS NEEDED
Status: DISCONTINUED | OUTPATIENT
Start: 2021-08-26 | End: 2021-08-26 | Stop reason: HOSPADM

## 2021-08-26 RX ORDER — MIDAZOLAM HYDROCHLORIDE 1 MG/ML
INJECTION INTRAMUSCULAR; INTRAVENOUS AS NEEDED
Status: DISCONTINUED | OUTPATIENT
Start: 2021-08-26 | End: 2021-08-26 | Stop reason: HOSPADM

## 2021-08-26 RX ORDER — LEVOTHYROXINE SODIUM 0.12 MG/1
125 TABLET ORAL
Status: DISCONTINUED | OUTPATIENT
Start: 2021-08-27 | End: 2021-08-27 | Stop reason: HOSPADM

## 2021-08-26 RX ORDER — LANOLIN ALCOHOL/MO/W.PET/CERES
1000 CREAM (GRAM) TOPICAL DAILY
Status: DISCONTINUED | OUTPATIENT
Start: 2021-08-26 | End: 2021-08-27 | Stop reason: HOSPADM

## 2021-08-26 RX ORDER — CLOPIDOGREL BISULFATE 75 MG/1
75 TABLET ORAL DAILY
Status: DISCONTINUED | OUTPATIENT
Start: 2021-08-27 | End: 2021-08-26 | Stop reason: SDUPTHER

## 2021-08-26 RX ORDER — FENTANYL CITRATE 50 UG/ML
INJECTION, SOLUTION INTRAMUSCULAR; INTRAVENOUS AS NEEDED
Status: DISCONTINUED | OUTPATIENT
Start: 2021-08-26 | End: 2021-08-26 | Stop reason: HOSPADM

## 2021-08-26 RX ORDER — ASPIRIN 81 MG/1
81 TABLET ORAL DAILY
Status: DISCONTINUED | OUTPATIENT
Start: 2021-08-27 | End: 2021-08-26 | Stop reason: SDUPTHER

## 2021-08-26 RX ORDER — ISOSORBIDE MONONITRATE 60 MG/1
120 TABLET, EXTENDED RELEASE ORAL DAILY
Status: DISCONTINUED | OUTPATIENT
Start: 2021-08-27 | End: 2021-08-27 | Stop reason: HOSPADM

## 2021-08-26 RX ORDER — AMLODIPINE BESYLATE 5 MG/1
10 TABLET ORAL DAILY
Status: DISCONTINUED | OUTPATIENT
Start: 2021-08-27 | End: 2021-08-27 | Stop reason: HOSPADM

## 2021-08-26 RX ORDER — ASPIRIN 81 MG/1
81 TABLET, CHEWABLE ORAL DAILY
Status: DISCONTINUED | OUTPATIENT
Start: 2021-08-27 | End: 2021-08-27 | Stop reason: HOSPADM

## 2021-08-26 RX ORDER — METOPROLOL SUCCINATE 25 MG/1
25 TABLET, EXTENDED RELEASE ORAL DAILY
Status: DISCONTINUED | OUTPATIENT
Start: 2021-08-27 | End: 2021-08-27 | Stop reason: HOSPADM

## 2021-08-26 RX ORDER — INSULIN LISPRO 100 [IU]/ML
15 INJECTION, SOLUTION INTRAVENOUS; SUBCUTANEOUS
Status: DISCONTINUED | OUTPATIENT
Start: 2021-08-26 | End: 2021-08-27 | Stop reason: HOSPADM

## 2021-08-26 RX ORDER — LIDOCAINE HYDROCHLORIDE 20 MG/ML
INJECTION, SOLUTION INFILTRATION; PERINEURAL AS NEEDED
Status: DISCONTINUED | OUTPATIENT
Start: 2021-08-26 | End: 2021-08-26 | Stop reason: HOSPADM

## 2021-08-26 RX ORDER — HEPARIN SODIUM 1000 [USP'U]/ML
INJECTION, SOLUTION INTRAVENOUS; SUBCUTANEOUS AS NEEDED
Status: DISCONTINUED | OUTPATIENT
Start: 2021-08-26 | End: 2021-08-26 | Stop reason: HOSPADM

## 2021-08-26 RX ORDER — ATORVASTATIN CALCIUM 40 MG/1
40 TABLET, FILM COATED ORAL NIGHTLY
Status: DISCONTINUED | OUTPATIENT
Start: 2021-08-26 | End: 2021-08-27 | Stop reason: HOSPADM

## 2021-08-26 RX ORDER — CLOPIDOGREL BISULFATE 75 MG/1
75 TABLET ORAL DAILY
Status: DISCONTINUED | OUTPATIENT
Start: 2021-08-27 | End: 2021-08-27 | Stop reason: HOSPADM

## 2021-08-26 RX ORDER — CLOPIDOGREL BISULFATE 75 MG/1
TABLET ORAL AS NEEDED
Status: DISCONTINUED | OUTPATIENT
Start: 2021-08-26 | End: 2021-08-26 | Stop reason: HOSPADM

## 2021-08-26 RX ORDER — ACETAMINOPHEN 325 MG/1
650 TABLET ORAL EVERY 4 HOURS PRN
Status: DISCONTINUED | OUTPATIENT
Start: 2021-08-26 | End: 2021-08-27 | Stop reason: HOSPADM

## 2021-08-26 RX ORDER — SODIUM CHLORIDE 9 MG/ML
30 INJECTION, SOLUTION INTRAVENOUS CONTINUOUS
Status: DISCONTINUED | OUTPATIENT
Start: 2021-08-26 | End: 2021-08-27 | Stop reason: HOSPADM

## 2021-08-26 RX ORDER — RANOLAZINE 500 MG/1
1000 TABLET, EXTENDED RELEASE ORAL EVERY 12 HOURS SCHEDULED
Status: DISCONTINUED | OUTPATIENT
Start: 2021-08-26 | End: 2021-08-27 | Stop reason: HOSPADM

## 2021-08-26 RX ORDER — SODIUM CHLORIDE 9 MG/ML
100 INJECTION, SOLUTION INTRAVENOUS CONTINUOUS
Status: DISCONTINUED | OUTPATIENT
Start: 2021-08-26 | End: 2021-08-27 | Stop reason: HOSPADM

## 2021-08-26 RX ADMIN — APIXABAN 5 MG: 5 TABLET, FILM COATED ORAL at 21:09

## 2021-08-26 RX ADMIN — INSULIN HUMAN 25 UNITS: 100 INJECTION, SUSPENSION SUBCUTANEOUS at 21:07

## 2021-08-26 RX ADMIN — ATORVASTATIN CALCIUM 40 MG: 40 TABLET, FILM COATED ORAL at 21:08

## 2021-08-26 RX ADMIN — RANOLAZINE 1000 MG: 500 TABLET, FILM COATED, EXTENDED RELEASE ORAL at 21:09

## 2021-08-26 RX ADMIN — Medication 2000 UNITS: at 21:09

## 2021-08-26 RX ADMIN — INSULIN LISPRO 15 UNITS: 100 INJECTION, SOLUTION INTRAVENOUS; SUBCUTANEOUS at 19:21

## 2021-08-26 NOTE — PLAN OF CARE
Goal Outcome Evaluation:         Patient arriving to opcv from cath lab post cardiac cath. Patient resting comfortably on the stretcher with no complaints at this time. Will continue to monitor. Plan to discharge in the AM if no issues throughout the night.

## 2021-08-27 VITALS
SYSTOLIC BLOOD PRESSURE: 120 MMHG | RESPIRATION RATE: 20 BRPM | WEIGHT: 236.55 LBS | HEIGHT: 71 IN | OXYGEN SATURATION: 97 % | DIASTOLIC BLOOD PRESSURE: 55 MMHG | HEART RATE: 58 BPM | TEMPERATURE: 98 F | BODY MASS INDEX: 33.12 KG/M2

## 2021-08-27 LAB
ANION GAP SERPL CALCULATED.3IONS-SCNC: 12 MMOL/L (ref 5–15)
BASOPHILS # BLD AUTO: 0 10*3/MM3 (ref 0–0.2)
BASOPHILS NFR BLD AUTO: 0.7 % (ref 0–1.5)
BUN SERPL-MCNC: 23 MG/DL (ref 8–23)
BUN/CREAT SERPL: 14.3 (ref 7–25)
CALCIUM SPEC-SCNC: 8.5 MG/DL (ref 8.6–10.5)
CHLORIDE SERPL-SCNC: 99 MMOL/L (ref 98–107)
CO2 SERPL-SCNC: 23 MMOL/L (ref 22–29)
CREAT SERPL-MCNC: 1.61 MG/DL (ref 0.76–1.27)
DEPRECATED RDW RBC AUTO: 42.4 FL (ref 37–54)
EOSINOPHIL # BLD AUTO: 0.1 10*3/MM3 (ref 0–0.4)
EOSINOPHIL NFR BLD AUTO: 1.6 % (ref 0.3–6.2)
ERYTHROCYTE [DISTWIDTH] IN BLOOD BY AUTOMATED COUNT: 13.3 % (ref 12.3–15.4)
GFR SERPL CREATININE-BSD FRML MDRD: 42 ML/MIN/1.73
GLUCOSE BLDC GLUCOMTR-MCNC: 164 MG/DL (ref 70–105)
GLUCOSE BLDC GLUCOMTR-MCNC: 220 MG/DL (ref 70–105)
GLUCOSE BLDC GLUCOMTR-MCNC: 284 MG/DL (ref 70–105)
GLUCOSE SERPL-MCNC: 292 MG/DL (ref 65–99)
HCT VFR BLD AUTO: 39.6 % (ref 37.5–51)
HGB BLD-MCNC: 13.6 G/DL (ref 13–17.7)
LYMPHOCYTES # BLD AUTO: 1.2 10*3/MM3 (ref 0.7–3.1)
LYMPHOCYTES NFR BLD AUTO: 19.6 % (ref 19.6–45.3)
MAGNESIUM SERPL-MCNC: 2.1 MG/DL (ref 1.6–2.4)
MCH RBC QN AUTO: 31.6 PG (ref 26.6–33)
MCHC RBC AUTO-ENTMCNC: 34.3 G/DL (ref 31.5–35.7)
MCV RBC AUTO: 92.3 FL (ref 79–97)
MONOCYTES # BLD AUTO: 0.7 10*3/MM3 (ref 0.1–0.9)
MONOCYTES NFR BLD AUTO: 10.9 % (ref 5–12)
NEUTROPHILS NFR BLD AUTO: 4.3 10*3/MM3 (ref 1.7–7)
NEUTROPHILS NFR BLD AUTO: 67.2 % (ref 42.7–76)
NRBC BLD AUTO-RTO: 0 /100 WBC (ref 0–0.2)
PLATELET # BLD AUTO: 183 10*3/MM3 (ref 140–450)
PMV BLD AUTO: 8.9 FL (ref 6–12)
POTASSIUM SERPL-SCNC: 4.9 MMOL/L (ref 3.5–5.2)
RBC # BLD AUTO: 4.29 10*6/MM3 (ref 4.14–5.8)
SODIUM SERPL-SCNC: 134 MMOL/L (ref 136–145)
WBC # BLD AUTO: 6.4 10*3/MM3 (ref 3.4–10.8)

## 2021-08-27 PROCEDURE — A9270 NON-COVERED ITEM OR SERVICE: HCPCS | Performed by: INTERNAL MEDICINE

## 2021-08-27 PROCEDURE — 63710000001 EMPAGLIFLOZIN 25 MG TABLET: Performed by: INTERNAL MEDICINE

## 2021-08-27 PROCEDURE — 80048 BASIC METABOLIC PNL TOTAL CA: CPT | Performed by: NURSE PRACTITIONER

## 2021-08-27 PROCEDURE — 63710000001 METOPROLOL SUCCINATE XL 25 MG TABLET SUSTAINED-RELEASE 24 HOUR: Performed by: INTERNAL MEDICINE

## 2021-08-27 PROCEDURE — 63710000001 INSULIN LISPRO (HUMAN) PER 5 UNITS: Performed by: INTERNAL MEDICINE

## 2021-08-27 PROCEDURE — 63710000001 ISOSORBIDE MONONITRATE 60 MG TABLET SUSTAINED-RELEASE 24 HOUR: Performed by: INTERNAL MEDICINE

## 2021-08-27 PROCEDURE — 63710000001 CLOPIDOGREL 75 MG TABLET: Performed by: INTERNAL MEDICINE

## 2021-08-27 PROCEDURE — 63710000001 VITAMIN B-12 1000 MCG TABLET: Performed by: INTERNAL MEDICINE

## 2021-08-27 PROCEDURE — 63710000001 ASPIRIN 81 MG CHEWABLE TABLET: Performed by: INTERNAL MEDICINE

## 2021-08-27 PROCEDURE — 83735 ASSAY OF MAGNESIUM: CPT | Performed by: NURSE PRACTITIONER

## 2021-08-27 PROCEDURE — G0378 HOSPITAL OBSERVATION PER HR: HCPCS

## 2021-08-27 PROCEDURE — 82962 GLUCOSE BLOOD TEST: CPT

## 2021-08-27 PROCEDURE — 63710000001 AMLODIPINE 5 MG TABLET: Performed by: INTERNAL MEDICINE

## 2021-08-27 PROCEDURE — 63710000001 RANOLAZINE 500 MG TABLET SUSTAINED-RELEASE 12 HOUR: Performed by: INTERNAL MEDICINE

## 2021-08-27 PROCEDURE — 85025 COMPLETE CBC W/AUTO DIFF WBC: CPT | Performed by: NURSE PRACTITIONER

## 2021-08-27 PROCEDURE — 99217 PR OBSERVATION CARE DISCHARGE MANAGEMENT: CPT | Performed by: INTERNAL MEDICINE

## 2021-08-27 PROCEDURE — 63710000001 LEVOTHYROXINE 125 MCG TABLET: Performed by: INTERNAL MEDICINE

## 2021-08-27 PROCEDURE — 63710000001 APIXABAN 5 MG TABLET: Performed by: INTERNAL MEDICINE

## 2021-08-27 RX ADMIN — RANOLAZINE 1000 MG: 500 TABLET, FILM COATED, EXTENDED RELEASE ORAL at 08:42

## 2021-08-27 RX ADMIN — INSULIN HUMAN 25 UNITS: 100 INJECTION, SUSPENSION SUBCUTANEOUS at 08:43

## 2021-08-27 RX ADMIN — METOPROLOL SUCCINATE 25 MG: 25 TABLET, EXTENDED RELEASE ORAL at 08:43

## 2021-08-27 RX ADMIN — ISOSORBIDE MONONITRATE 120 MG: 60 TABLET, EXTENDED RELEASE ORAL at 08:42

## 2021-08-27 RX ADMIN — CLOPIDOGREL BISULFATE 75 MG: 75 TABLET ORAL at 08:42

## 2021-08-27 RX ADMIN — EMPAGLIFLOZIN 25 MG: 25 TABLET, FILM COATED ORAL at 09:40

## 2021-08-27 RX ADMIN — CYANOCOBALAMIN TAB 1000 MCG 1000 MCG: 1000 TAB at 08:42

## 2021-08-27 RX ADMIN — LEVOTHYROXINE SODIUM 125 MCG: 0.12 TABLET ORAL at 06:14

## 2021-08-27 RX ADMIN — AMLODIPINE BESYLATE 10 MG: 5 TABLET ORAL at 08:42

## 2021-08-27 RX ADMIN — APIXABAN 5 MG: 5 TABLET, FILM COATED ORAL at 08:42

## 2021-08-27 RX ADMIN — ASPIRIN 81 MG: 81 TABLET, CHEWABLE ORAL at 08:42

## 2021-08-27 RX ADMIN — INSULIN LISPRO 15 UNITS: 100 INJECTION, SOLUTION INTRAVENOUS; SUBCUTANEOUS at 08:43

## 2021-08-27 NOTE — DISCHARGE SUMMARY
\[facility]\"" HEART SPECIAL[facility]  Nacho Al MD, PhD  DISCHARGE SUMMARY      Patient Name: Carroll Rodgers  :1945  76 y.o.    Date of Admit: 2021  Date of Discharge:  2021    Discharge Diagnosis:  Problems Addressed this Visit          Cardiac and Vasculature    CAD S/P percutaneous coronary angioplasty    Relevant Medications    isosorbide mononitrate (IMDUR) 120 MG 24 hr tablet    Other Relevant Orders    COVID PRE-OP / PRE-PROCEDURE SCREENING ORDER (NO ISOLATION) - Swab, Nasopharynx (Completed)    Cardiac Catheterization/Vascular Study (Completed)    Angina at rest (CMS/HCC) - Primary    Relevant Medications    isosorbide mononitrate (IMDUR) 120 MG 24 hr tablet    Other Relevant Orders    COVID PRE-OP / PRE-PROCEDURE SCREENING ORDER (NO ISOLATION) - Swab, Nasopharynx (Completed)          Other Visit Diagnoses       Unstable angina (CMS/HCC)        Relevant Medications    isosorbide mononitrate (IMDUR) 120 MG 24 hr tablet    Other Relevant Orders    Cardiac Catheterization/Vascular Study (Completed)          Diagnoses         Codes Comments    Angina at rest (CMS/HCC)    -  Primary ICD-10-CM: I20.8  ICD-9-CM: 413.9     CAD S/P percutaneous coronary angioplasty     ICD-10-CM: I25.10, Z98.61  ICD-9-CM: 414.01, V45.82     Unstable angina (CMS/HCC)     ICD-10-CM: I20.0  ICD-9-CM: 411.1         Seen and examined  No acute events overnight  Stable for discharge status post PCI  Reviewed antiplatelet therapy risks and activity restrictions  No questions per the patient  Follow-up with primary cardiology 2 to 4 weeks after discharge  Diet exercise per [place] guidelines  Secondary prevention goals  Medications reviewed risk and benefits and necessity of each medicine discussed  Agree with exam as documented as discussed with nurse practitioner after my encounter  Agree with plan documented extensively below  Nacho Al MD, PhD    1. CAD   - s/p prior PCI  - s/p [facility] 2021:  Laser atherectomy,  Percutaneous coronary intervention involving drug-eluting stent placement to the chronic total occlusion of the ostial left main and mid proximal circumflex arteries (3.0 x 23 mm Xience drug-eluting stent), Percutaneous coronary intervention involving drug-eluting stent placement to the mid proximal circumflex artery (2.25 x 38  Xience drug-eluting stents)  - ASA / Plavix / statin / on Eliquis, continue triple therapy x 1 mo then can stop ASA but will defer to Dr. Ochoa  - on toprol XL    2. H/o paroxysmal afib  - currently SR, on toprol XL and Eliquis    3. CKD  - crt stable    4. H/o HTN    5. H/o HLD    Hospital Course:   Patient presented for left heart catheterization. He underwent laser atherectomy, percutaneous coronary intervention involving drug-eluting stent placement to the chronic total occlusion of the ostial left main and mid proximal circumflex arteries (3.0 x 23 mm Xience drug-eluting stent), and percutaneous coronary intervention involving drug-eluting stent placement to the mid proximal circumflex artery (2.25 x 38  Xience drug-eluting stents).  He is maintained on ASA and Plavix. He is also on Eliquis for a history of paroxysmal afib.  He will be maintained on Triple therapy x 1 mo then possibly stop ASA if Dr. Ochoa agrees.  Patient had no complications. He has mild groin bruising but no hematoma. He had labwork this morning which demonstrated crt slightly improved compared to pre procedure labs.  He will restart metformin 48 hr post cath.  He is stable to discharge and will follow up with Dr. Ochoa in 2-4 weeks.  He has been given post cath discharge instructions.       Procedures Performed  Procedure(s):  Left Heart Cath  Percutaneous Coronary Intervention  Stent LARISA coronary  Atherectomy-coronary    Indication for procedure:     1.  Unstable angina  2.  Known CAD status post coronary artery bypass grafting in the past with only remaining conduit LIMA to the LAD  3.  Multiple coronary  artery risk factors with multiple PCI     Procedures performed:     1.  Native coronary arteriography  2.  Left Heart catheterization  3.  Left ventriculography  4.  Selective angiography of the LIMA to the LAD  5.  Laser atherectomy  6.  Percutaneous coronary intervention involving drug-eluting stent placement to the chronic total occlusion of the ostial left main and mid proximal circumflex arteries (3.0 x 23 mm Xience drug-eluting stent)  7.  Percutaneous coronary intervention involving drug-eluting stent placement to the mid proximal circumflex artery (2.25 x 38  Xience drug-eluting stents)     Description of procedure:     Patient had the risks and benefits of the procedure explained to them in detail after which informed consent was obtained.  Patient was then brought to the cardiac catheterization lab and placed on the table in supine position.  Next the right groin was prepped and draped in sterile fashion.  Next using modified Seldinger technique and a 21-gauge micro puncture needle, the femoral artery was cannulated without difficulty and a 6 [place] sheath was inserted and flushed with heparinized saline.  Next using diagnostic 6 Estonian JR4 and JL4 catheters, each advanced over an 0.035 guidewire to the level the aortic root and then used to selectively engage their respective coronary ostia, multiple cineangiographic images were obtained all the appropriate projections using hand-injection of nonionic contrast material.  Before removal of the JR4 catheter the JR4 catheter used to selectively intubate the LIMA to the LAD with angiography as described above.  It was then used to cross the aortic valve into the left ventricle for left heart catheterization left ventriculography.  The catheter was then pulled back and removed.     The culprit was identified to fold as a chronic totally occluded left main coronary artery and a diffuse Vu diseased in-stent restenotic circumflex artery bifurcating obtuse  marginal branch.  Therefore using escalating wires with final success with a whisper extra-support we were able to traverse the distal left main coronary artery into the circumflex artery placing the whisper export wire in the inferior limb of the bifurcating obtuse marginal branch.  Laser atherectomy was then performed at 60 x 60.  Were able to restore MONIE-3 flow from MONIE I flow through the chronically totally occluded distal left main coronary artery.  We then wired into the superior limb to attempt to restore flow to with plans to not restent this vessel.  This was achieved with a 2.5 mm NC trek balloon with restoration of MONIE-3 flow into the vessel.  Next using a [place] 3.0 x 10 cutting balloon we reduce the ostial proximal circumflex and distal LAD stenosis to 30 to 40%.  Stenting of the left main coronary artery and inferior limb of the circumflex OM was performed distal to proximally using a 2.25 x 38 overlapped with a 3.0 x 23 mm Xience drug-eluting stents deployed at nominal pressure and postdilated to greater than 3 mm distally and 3.2 mm proximally with an NC trek balloon at 24 delia along the stented segment.  We achieved an excellent angiographic result in the left main ostial proximal circumflex and continuation of the inferior limb of the circumflex OM with immediate left to left collateralization of the superior limb with retrograde filling of the superior OM.  Guidewire and catheter were then removed.     Patient tolerated the procedure well there were no complications.     During the case, conscious sedation monitoring was 70 min.     At the end of the case a 6 [place] Angio-Seal device was deployed in the right groin for right groin hemostasis     Findings     Left heart catheterization:     1.  Opening aortic pressure was 115/46 mmHg the mean pressure 64 mmHg  2.  Left ventricular end-diastolic pressure at end expiration was 10 mmHg  3.  No gradient across aortic valve on pullback     Left  ventriculography    Overall estimated left ventricular ejection fraction is 45 to 50% with mild focal lateral wall hypokinesis with severe mid inferior wall hypokinesis.     Native coronary arteriography: Right dominant circulation with known chronic total occlusion     1.  Left main coronary artery is a large-caliber vessel gives rise left anterior descending left circumflex flex arteries.  There is distal chronic total occlusion of the left main coronary artery into the ostial circumflex due to critical in-stent restenosis with MONIE I flow via bridging collaterals into the circumflex with otherwise no significant coronary atherosclerotic disease present.  2.  Left anterior descending artery is a large-caliber vessel that is chronically totally occluded at its midportion  3.  The left circumflex artery is a large-caliber vessel that is chronically totally occluded at its ostial proximal portion with severe diffuse disease in the superior limb of the terminating bifurcating obtuse marginal branch with moderate disease in the inferior limb of this vessel to the midportion which is severely diseased due to in-stent restenosis.  4.  The right coronary artery is a large-caliber vessel known to be chronically totally occluded     The LIMA to the LAD is widely patent its ostium throughout its course and at its anastomosis with the native vessel; at the anastomosis and beyond there is an apical 80% branch that is unchanged from all previous cardiac catheterizations reconstituting a diffusely diseased diminutive PDA of the chronically totally occluded right coronary artery primarily via septal perforating branches and apical LAD     Conclusions:     1.  High normal left heart filling pressures with mildly decreased LV systolic function  2.  Normal epicardial anatomy with three-vessel coronary artery disease with widely patent LIMA to the LAD and culprit vessel identified as distal left main ostial proximal circumflex  artery; with chronic total occlusion of the right coronary artery unchanged from previous cardiac catheterizations  3.  Successful recanalization and revascularization of the chronic total occlusion of the distal left main ostial proximal circumflex artery.  4.  Successful laser atherectomy of the distal left main coronary artery and ostial proximal circumflex artery.  5.  Successful PCI and stenting of the circumflex artery  6.  Successful PCI and stenting of the left main coronary artery with restoration of MONIE-3 flow to the vessels.     Recommendations:     Optimize medical therapy for secondary prevention of ischemic heart disease.      Consults       No orders found for last 30 day(s).            Pertinent Test Results:   Results from last 7 days   Lab Units 08/27/21  1204 08/23/21  1401 08/23/21  1401   SODIUM mmol/L 134*   < > 136   POTASSIUM mmol/L 4.9   < > 4.7   CHLORIDE mmol/L 99   < > 100   CO2 mmol/L 23.0   < > 24.2   BUN mg/dL 23   < > 25*   CREATININE mg/dL 1.61*   < > 1.76*   CALCIUM mg/dL 8.5*   < > 9.2   BILIRUBIN mg/dL  --   --  0.2   ALK PHOS U/L  --   --  140*   ALT (SGPT) U/L  --   --  9   AST (SGOT) U/L  --   --  8   GLUCOSE mg/dL 292*   < > 102*    < > = values in this interval not displayed.         @LABRCNT(bnp)@  Results from last 7 days   Lab Units 08/27/21  1204   WBC 10*3/mm3 6.40   HEMOGLOBIN g/dL 13.6   HEMATOCRIT % 39.6   PLATELETS 10*3/mm3 183     Results from last 7 days   Lab Units 08/23/21  1401   INR  1.04     Results from last 7 days   Lab Units 08/27/21  1204   MAGNESIUM mg/dL 2.1           ECHOCARDIOGRAM:    Results for orders placed during the hospital encounter of 05/19/21    Adult Transthoracic Echo Complete W/ Color, Spectral and Contrast if Necessary Per Protocol    Interpretation Summary  · Left ventricular diastolic function is consistent with (grade Ia w/high LAP) impaired relaxation.  · Left ventricular ejection fraction appears to be 56 - 60%.    Abnormal  study  Overall EF 50%  Abnormal wall motion at the apex and anteroseptum consistent with prior ischemic injury this wall severely hypokinetic  Other segments appear to contract normally  Grade 1 diastolic dysfunction  Normal size RV with normal contractile parameters  Normal atrial sizes grossly  No significant valvular abnormality  Unable to quantify PA systolic pressures with insignificant TR gradient  No masses or effusions grossly        Condition on Discharge: stable    Physical Exam:  General Appearance:    Alert, cooperative, in no acute distress               Neck:   supple,no JVD   Lungs:     Clear to auscultation,respirations regular, even and                  unlabored    Heart:    Regular rhythm and normal rate, normal S1 and S2   Abdomen:     Normal bowel sounds, soft   Extremities:   Moves all extremities well, no edema, no cyanosis, no             Redness right groin soft without hematoma, slight bruising   Pulses:   Pulses palpable and equal bilaterally   Skin:   No bleeding, bruising or rash   Neurologic:   Awake, alert, oriented x3       Discharge Medications     Discharge Medications        Continue These Medications        Instructions Start Date   amLODIPine 10 MG tablet  Commonly known as: NORVASC   10 mg, Oral, Daily      apixaban 5 MG tablet tablet  Commonly known as: ELIQUIS   5 mg, Oral, Every 12 Hours Scheduled      aspirin 81 MG chewable tablet   81 mg, Oral, Daily      atorvastatin 40 MG tablet  Commonly known as: LIPITOR   40 mg, Oral, Nightly      cholecalciferol 25 MCG (1000 UT) tablet  Commonly known as: VITAMIN D3   2,000 Units, Oral, Nightly      clopidogrel 75 MG tablet  Commonly known as: PLAVIX   75 mg, Oral, Daily      EMPAGLIFLOZIN PO   25 mg, Oral, Daily      insulin  UNIT/ML injection  Commonly known as: humuLIN N,novoLIN N   25 Units, Subcutaneous, 2 Times Daily, Breakfast and dinner      isosorbide mononitrate 120 MG 24 hr tablet  Commonly known as: IMDUR   120 mg,  Oral, Daily      levothyroxine 125 MCG tablet  Commonly known as: SYNTHROID, LEVOTHROID   125 mcg, Oral, Daily      lisinopril-hydrochlorothiazide 20-12.5 MG per tablet  Commonly known as: PRINZIDE,ZESTORETIC   1 tablet, Oral, Daily      metFORMIN  MG 24 hr tablet  Commonly known as: GLUCOPHAGE-XR   500 mg, Every 12 Hours      metoprolol succinate XL 25 MG 24 hr tablet  Commonly known as: TOPROL-XL   25 mg, Oral, Daily      nitroglycerin 0.4 MG SL tablet  Commonly known as: NITROSTAT   1 under the tongue as needed for angina, may repeat q5mins for up three doses      NOVOLOG SC   15 Units, Subcutaneous, 2 Times Daily, Breakfast and dinner      ranolazine 1000 MG 12 hr tablet  Commonly known as: Ranexa   1,000 mg, Oral, Every 12 Hours Scheduled      vitamin B-12 1000 MCG tablet  Commonly known as: CYANOCOBALAMIN   1,000 mcg, Oral, Daily               Discharge Diet:   Diet Instructions       Diet: Cardiac      Discharge Diet: Cardiac            Activity at Discharge:   Activity Instructions       Discharge Activity      Rest and relax today  no lifting over 10 lbs x 1 week  no driving for 24 hrs  no tub baths or hot tubs for 1 week  no stairs for 3 days  monitor right groin for s/s of bleeding            Discharge disposition: home    Follow-up Appointments  No future appointments.       Test Results Pending at Discharge        BAYRON Hair, [facility]    08/27/21  13:55 EDT    Time: > 30 minutes spent on discharge explaining discharge medications, instructions, activity / diet instructions / restrictions, counseling on disease processes, and follow up care / appointments.

## 2021-08-27 NOTE — PLAN OF CARE
Pt slept throughout the night with no distress noted; will continue to monitor    Problem: Adult Inpatient Plan of Care  Goal: Plan of Care Review  Outcome: Ongoing, Progressing  Goal: Patient-Specific Goal (Individualized)  Outcome: Ongoing, Progressing  Goal: Absence of Hospital-Acquired Illness or Injury  Outcome: Ongoing, Progressing  Intervention: Identify and Manage Fall Risk  Recent Flowsheet Documentation  Taken 8/27/2021 0400 by Mansi Jarquin RN  Safety Promotion/Fall Prevention:   activity supervised   assistive device/personal items within reach   clutter free environment maintained   safety round/check completed  Taken 8/27/2021 0200 by Mansi Jarquin RN  Safety Promotion/Fall Prevention:   activity supervised   assistive device/personal items within reach   clutter free environment maintained   safety round/check completed  Taken 8/27/2021 0000 by Mansi Jarquin RN  Safety Promotion/Fall Prevention:   activity supervised   assistive device/personal items within reach   clutter free environment maintained   safety round/check completed  Taken 8/26/2021 2215 by Mansi Jarquin RN  Safety Promotion/Fall Prevention:   activity supervised   assistive device/personal items within reach   clutter free environment maintained   safety round/check completed  Goal: Optimal Comfort and Wellbeing  Outcome: Ongoing, Progressing  Intervention: Provide Person-Centered Care  Recent Flowsheet Documentation  Taken 8/26/2021 2215 by Mansi Jarquin RN  Trust Relationship/Rapport:   care explained   choices provided   emotional support provided  Goal: Readiness for Transition of Care  Outcome: Ongoing, Progressing     Problem: Arrhythmia/Dysrhythmia (Cardiac Catheterization)  Goal: Stable Heart Rate and Rhythm  Outcome: Ongoing, Progressing     Problem: Bleeding (Cardiac Catheterization)  Goal: Absence of Bleeding  Outcome: Ongoing, Progressing     Problem: Contrast-Induced Injury Risk (Cardiac Catheterization)  Goal: Absence of  Contrast-Induced Injury  Outcome: Ongoing, Progressing     Problem: Embolism (Cardiac Catheterization)  Goal: Absence of Embolism Signs and Symptoms  Outcome: Ongoing, Progressing     Problem: Ongoing Anesthesia/Sedation Effects (Cardiac Catheterization)  Goal: Anesthesia/Sedation Recovery  Outcome: Ongoing, Progressing  Intervention: Optimize Anesthesia Recovery  Recent Flowsheet Documentation  Taken 8/27/2021 0400 by Mansi Jarquin RN  Safety Promotion/Fall Prevention:   activity supervised   assistive device/personal items within reach   clutter free environment maintained   safety round/check completed  Taken 8/27/2021 0200 by Mansi Jaruqin RN  Safety Promotion/Fall Prevention:   activity supervised   assistive device/personal items within reach   clutter free environment maintained   safety round/check completed  Taken 8/27/2021 0000 by Mansi Jarquin RN  Safety Promotion/Fall Prevention:   activity supervised   assistive device/personal items within reach   clutter free environment maintained   safety round/check completed  Taken 8/26/2021 2215 by Mansi Jarquin RN  Safety Promotion/Fall Prevention:   activity supervised   assistive device/personal items within reach   clutter free environment maintained   safety round/check completed     Problem: Pain (Cardiac Catheterization)  Goal: Acceptable Pain Control  Outcome: Ongoing, Progressing  Intervention: Prevent or Manage Pain  Recent Flowsheet Documentation  Taken 8/26/2021 2215 by Mansi Jarquin RN  Diversional Activities: television     Problem: Vascular Access Protection (Cardiac Catheterization)  Goal: Absence of Vascular Access Complication  Outcome: Ongoing, Progressing   Goal Outcome Evaluation:

## 2021-08-30 ENCOUNTER — TELEPHONE (OUTPATIENT)
Dept: CARDIAC REHAB | Facility: HOSPITAL | Age: 76
End: 2021-08-30

## 2021-08-30 NOTE — TELEPHONE ENCOUNTER
Spoke with patient for follow up after PCI. Patient reports he is doing well, no chest pain or unusual SOA and is taking all medication as prescribed. Patient states that he is not interested in participating in cardiac rehab at this time.

## 2021-08-30 NOTE — CASE MANAGEMENT/SOCIAL WORK
Case Management Discharge Note      Final Note: home         Selected Continued Care - Discharged on 8/27/2021 Admission date: 8/26/2021 - Discharge disposition: Home or Self Care              Final Discharge Disposition Code: 01 - home or self-care

## 2021-09-01 ENCOUNTER — OFFICE VISIT (OUTPATIENT)
Dept: CARDIOLOGY | Facility: CLINIC | Age: 76
End: 2021-09-01

## 2021-09-01 VITALS
RESPIRATION RATE: 18 BRPM | HEIGHT: 71 IN | BODY MASS INDEX: 32.76 KG/M2 | HEART RATE: 63 BPM | WEIGHT: 234 LBS | OXYGEN SATURATION: 96 % | SYSTOLIC BLOOD PRESSURE: 104 MMHG | DIASTOLIC BLOOD PRESSURE: 56 MMHG

## 2021-09-01 DIAGNOSIS — I10 ESSENTIAL HYPERTENSION: ICD-10-CM

## 2021-09-01 DIAGNOSIS — I25.83 CORONARY ARTERY DISEASE DUE TO LIPID RICH PLAQUE: ICD-10-CM

## 2021-09-01 DIAGNOSIS — I25.10 CAD S/P PERCUTANEOUS CORONARY ANGIOPLASTY: Primary | ICD-10-CM

## 2021-09-01 DIAGNOSIS — E78.2 MIXED HYPERLIPIDEMIA: ICD-10-CM

## 2021-09-01 DIAGNOSIS — I25.10 CORONARY ARTERY DISEASE DUE TO LIPID RICH PLAQUE: ICD-10-CM

## 2021-09-01 DIAGNOSIS — Z98.61 CAD S/P PERCUTANEOUS CORONARY ANGIOPLASTY: Primary | ICD-10-CM

## 2021-09-01 PROCEDURE — 99214 OFFICE O/P EST MOD 30 MIN: CPT | Performed by: INTERNAL MEDICINE

## 2021-09-01 NOTE — PROGRESS NOTES
Subjective:     Encounter Date:09/01/2021      Patient ID: Carroll Rodgers is a 76 y.o. male.    Chief Complaint:  Chief Complaint   Patient presents with   • Coronary Artery Disease     PCI f/u-8/26/2021       HPI:  Carroll is a very pleasant 75-year-old patient initially self-referred.  He is previously seen cardiologist in [facility].   He has known coronary artery disease and is status post coronary artery bypass grafting in 1996 with a LIMA to the LAD and SVG to the RCA and circumflex artery.  He subsequently had total occlusion of his SVG graft to the obtuse marginal branch and underwent stenting with a drug-eluting stent 7/1/2015 as well as his SVG graft to the right coronary artery.  Subsequently he developed exertional and rest angina with his worst symptom being exertional dyspnea.  Cardiac catheterization performed 1/30/2018 showed critical stenosis of the jailed segment of the inferior branch of the circumflex obtuse marginal branch with what appeared to be 70% in-stent restenosis of the superior parent limb of the circumflex into the superior limb.  He was told that medical therapy was his best option.  Since then he has had worsening angina on Ranexa and worsening dyspnea.    He came for second opinion.    I personally reviewed the cardiac catheterization images from 1/30/2018 and confirmed the above results.  In addition he has a patent LIMA to the LAD with a distal apical 80% narrowing that is inconsequential.  He has a chronic total occlusion of his SVG graft to the his right coronary artery and again an occlusion of his SVG to the OM.  The circumflex OM native vessel is as described above.  His ECG last visit in the office showed normal sinus rhythm with APCs and a old left bundle branch block.      His chest pain syndrome was that of exertional angina often if not always associated with dyspnea with chronic stable angina class III symptoms versus unstable angina given the increase in  frequency.  He does not have associated nausea or diaphoresis.    He underwent PCI and stenting of the circumflex artery 9/9/2019 with an excellent angiographic result with restoration of MONIE-3 flow.  Since then he had done well.    He was recently diagnosed with new onset paroxysmal atrial fibrillation.  I personally reviewed his echo from 8/23/2019 which showed normal LV systolic function with diastolic grade 1A impaired relaxation normal RV function with no significant valvular heart disease.  He was loaded with amiodarone which chemically converted him to normal sinus rhythm.  He felt a little bit better since then. He had pulmonary function tests and thyroid function test sent.  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  He was therefore taken off amiodarone started on levothyroxine and maintained on metoprolol and Xarelto for anticoagulation.    Earlier this year he returned with some shortness of breath and chest tightness with extreme exertion.  I personally reviewed his echocardiogram from 2/28/2020 which shows normal LV systolic function with ejection fraction of 55 to 60% and underlying grade 1A diastolic dysfunction with no significant valvular heart disease.  He underwent cardiac catheterization which revealed critical in-stent restenosis of the superior limb of the obtuse marginal branch previously stented.  He underwent PCI and stenting 8/13/2020.    Unfortunately he returned after only 4 months with identical symptoms of chest tightness and shortness of breath.  I took him off of his Imdur because he was doing well.  I also took him off of Imdur and ranolazine because he was doing well.    Recently his angina was unstable in its frequency, it is controlled with nitroglycerin.  It occurs only with exertion and does not occur at rest.  He has no associated diaphoresis or nausea.  We tried to optimize medical therapy for angina pectoris.  We  initiated antianginals as follows: Imdur 90 mg p.o. daily with ranolazine 1000 mg p.o. twice daily.  But [facility] got [facility].    Once again we revascularized his left main ostium and circumflex.  Today he returns with no complaints from a cardiovascular standpoint.    The following portions of the patient's history were reviewed and updated as appropriate: allergies, current medications, past family history, past medical history, past social history, past surgical history and problem list.    Problem List:  Patient Active Problem List   Diagnosis   • Type 2 diabetes mellitus without complication, with long-term current use of insulin (CMS/[facility])   • Mixed hyperlipidemia   • Essential hypertension   • Acquired hypothyroidism   • CAD S/P percutaneous coronary angioplasty   • New onset atrial fibrillation (CMS/HCC)   • Angina at rest (CMS/HCC)       Past Medical History:  Past Medical History:   Diagnosis Date   • Coronary artery disease    • Diabetes mellitus (CMS/[facility])    • Hyperlipidemia    • Hypertension    • Hypothyroidism    • New onset atrial fibrillation (CMS/HCC) 1/11/2020   • Stroke (CMS/HCC)     X2   • Unstable angina (CMS/HCC) 8/22/2019       Past Surgical History:  Past Surgical History:   Procedure Laterality Date   • CARDIAC CATHETERIZATION  01/30/2018    EF 40%, Distal LM 50% stenosis, Proximal LM 50% stenosis, Proximal LAD 95% stenosis, Mid % stenosis, Apical LAD 70% stenosis, Significant ISR at bifurcation, 90% stenosis at circumflex portoin, 90% stenosis at ostial OM, distal circumflex 80% stenosis, Mid % stenosis, SVG to % occluded at ostium, SVG to OM branches 100% occluded, LIMA to LAD has no significant disease   • CARDIAC CATHETERIZATION N/A 9/9/2019    PCI to OM1 and OM2 of LCX using overlapping Xience LARISA' and [place] LARISA. PCI to LM into distal LM and ostial proximal circ using Xience LARISA. Laser atherectomy.   • CARDIAC CATHETERIZATION N/A 9/9/2019     Procedure: Atherectomy-coronary;  Surgeon: Gonzalez Ochoa MD;  Location: [facility] CATH INVASIVE LOCATION;  Service: Cardiology   • CARDIAC CATHETERIZATION N/A 8/13/2020    Procedure: Left Heart Cath;  Surgeon: Gonzalez Ochoa MD;  Location: [facility] CATH INVASIVE LOCATION;  Service: Cardiology;  Laterality: N/A;   • CARDIAC CATHETERIZATION N/A 8/13/2020    Procedure: Percutaneous Coronary Intervention;  Surgeon: Gonzalez Ochoa MD;  Location: [facility] CATH INVASIVE LOCATION;  Service: Cardiology;  Laterality: N/A;  PCI  Left Main PTCA/LARISA  PCI Circx/OM  PTCA/LARISA   • CARDIAC CATHETERIZATION N/A 8/26/2021    Procedure: Left Heart Cath;  Surgeon: Gonzalez Ochoa MD;  Location: [facility] CATH INVASIVE LOCATION;  Service: Cardiology;  Laterality: N/A;   • CARDIAC CATHETERIZATION N/A 8/26/2021    Procedure: Percutaneous Coronary Intervention;  Surgeon: Gonzalez Ochoa MD;  Location: [facility] CATH INVASIVE LOCATION;  Service: Cardiology;  Laterality: N/A;   • CARDIAC CATHETERIZATION N/A 8/26/2021    Procedure: Stent LARISA coronary;  Surgeon: Gonzalez Ochoa MD;  Location: [facility] CATH INVASIVE LOCATION;  Service: Cardiology;  Laterality: N/A;   • CARDIAC CATHETERIZATION N/A 8/26/2021    Procedure: Atherectomy-coronary;  Surgeon: Gonzalez Ochoa MD;  Location: [facility] CATH INVASIVE LOCATION;  Service: Cardiology;  Laterality: N/A;  laser   • CORONARY ARTERY BYPASS GRAFT  1996    X3   • CORONARY STENT PLACEMENT  07/01/2015    Xience [place] LARISA to RCA (X2) and Mid Circ (X1)-Dr. Rodriguez   • CORONARY STENT PLACEMENT  2006   • CORONARY STENT PLACEMENT  2010       Social History:  Social History     Socioeconomic History   • Marital status:      Spouse name: Not on file   • Number of children: Not on file   • Years of education: Not on file   • Highest education level: Not on file   Tobacco Use   • Smoking status: Former Smoker   • Smokeless tobacco: Never  "Used   • Tobacco comment: 40 years ago   Substance and Sexual Activity   • Alcohol use: Not Currently   • Drug use: No   • Sexual activity: Defer       Allergies:  Allergies   Allergen Reactions   • Peanut-Containing Drug Products            Review of Symptoms:  Constitutional: Patient afebrile no chills or unexpected weight changes  Respiratory: No cough, no wheezing or dyspnea  Cardiovascular: Today the patient complains of no chest pain, palpitations, dyspnea, orthopnea and no edema  Gastrointestinal: No nausea, vomiting, constipation or diarrhea.  No melena or dark stools    All other systems reviewed and are negative           Objective:         /56 (BP Location: Left arm, Patient Position: Sitting, Cuff Size: Large Adult)   Pulse 63   Resp 18   Ht 180.3 cm (71\")   Wt 106 kg (234 lb)   SpO2 96%   BMI 32.64 kg/m²       Physical exam  Constitutional: well-nourished, and appears stated age in no acute distress  PERRL: Conjunctiva clear, no pallor, anicteric  HENMT: normocephalic, normal dentition, no cyanosis or pallor  Neck:no bruits, or thrills and bilateral normal carotid upstroke. Normal jugular venous pressure  Cardiovascular: No parasternal heaves an non-displaced focal PMI. Normal rate and rhythm: no rub, gallop, murmur or click and normal S1 and S2; no lower or upper extremity edema.   Lungs: unlabored, no wheezing with no rales or rhonchi on auscultation.  Extremities: Warm, no clubbing, cyanosis. Full and equal peripheral pulses in extremities with no bruits appreciated.   Abdomen: soft, non-tender, non-distended  Musculoskeletal: no joint tenderness or swelling and no erythema  Skin: Warm and dry, non-erythematous   Neuro:alert and normal affect. Oriented to time, place and person.       In-Office Procedure(s):  Procedures    ASCVD RIsk Score::  The ASCVD Risk score ([place] MICHAEL Jr., et al., 2013) failed to calculate for the following reasons:    Cannot find a previous HDL lab    Cannot find a " previous total cholesterol lab    Recent Radiology:  Imaging Results (Most Recent)     None          Lab Review:   Admission on 08/26/2021, Discharged on 08/27/2021   Component Date Value   • Glucose 08/26/2021 225*   • Activated Clotting Time  08/26/2021 307*   • Activated Clotting Time  08/26/2021 307*   • Activated Clotting Time  08/26/2021 235*   • Glucose 08/26/2021 340*   • Glucose 08/26/2021 405*   • Glucose 08/27/2021 164*   • Glucose 08/27/2021 220*   • Glucose 08/27/2021 292*   • BUN 08/27/2021 23    • Creatinine 08/27/2021 1.61*   • Sodium 08/27/2021 134*   • Potassium 08/27/2021 4.9    • Chloride 08/27/2021 99    • CO2 08/27/2021 23.0    • Calcium 08/27/2021 8.5*   • eGFR Non  Amer 08/27/2021 42*   • BUN/Creatinine Ratio 08/27/2021 14.3    • Anion Gap 08/27/2021 12.0    • Magnesium 08/27/2021 2.1    • WBC 08/27/2021 6.40    • RBC 08/27/2021 4.29    • Hemoglobin 08/27/2021 13.6    • Hematocrit 08/27/2021 39.6    • MCV 08/27/2021 92.3    • MCH 08/27/2021 31.6    • MCHC 08/27/2021 34.3    • RDW 08/27/2021 13.3    • RDW-SD 08/27/2021 42.4    • MPV 08/27/2021 8.9    • Platelets 08/27/2021 183    • Neutrophil % 08/27/2021 67.2    • Lymphocyte % 08/27/2021 19.6    • Monocyte % 08/27/2021 10.9    • Eosinophil % 08/27/2021 1.6    • Basophil % 08/27/2021 0.7    • Neutrophils, Absolute 08/27/2021 4.30    • Lymphocytes, Absolute 08/27/2021 1.20    • Monocytes, Absolute 08/27/2021 0.70    • Eosinophils, Absolute 08/27/2021 0.10    • Basophils, Absolute 08/27/2021 0.00    • nRBC 08/27/2021 0.0    • Glucose 08/27/2021 284*   Lab on 08/26/2021   Component Date Value   • COVID19 08/26/2021 Not Detected    Lab on 08/23/2021   Component Date Value   • Glucose 08/23/2021 102*   • BUN 08/23/2021 25*   • Creatinine 08/23/2021 1.76*   • Sodium 08/23/2021 136    • Potassium 08/23/2021 4.7    • Chloride 08/23/2021 100    • CO2 08/23/2021 24.2    • Calcium 08/23/2021 9.2    • Total Protein 08/23/2021 7.3    • Albumin  08/23/2021 4.20    • ALT (SGPT) 08/23/2021 9    • AST (SGOT) 08/23/2021 8    • Alkaline Phosphatase 08/23/2021 140*   • Total Bilirubin 08/23/2021 0.2    • eGFR Non  Amer 08/23/2021 38*   • Globulin 08/23/2021 3.1    • A/G Ratio 08/23/2021 1.4    • BUN/Creatinine Ratio 08/23/2021 14.2    • Anion Gap 08/23/2021 11.8    • Protime 08/23/2021 11.5    • INR 08/23/2021 1.04    • WBC 08/23/2021 5.67    • RBC 08/23/2021 4.66    • Hemoglobin 08/23/2021 14.4    • Hematocrit 08/23/2021 41.5    • MCV 08/23/2021 89.1    • MCH 08/23/2021 30.9    • MCHC 08/23/2021 34.7    • RDW 08/23/2021 12.6    • RDW-SD 08/23/2021 40.3    • MPV 08/23/2021 11.1    • Platelets 08/23/2021 205    • Neutrophil % 08/23/2021 58.0    • Lymphocyte % 08/23/2021 25.2    • Monocyte % 08/23/2021 13.1*   • Eosinophil % 08/23/2021 2.8    • Basophil % 08/23/2021 0.7    • Immature Grans % 08/23/2021 0.2    • Neutrophils, Absolute 08/23/2021 3.29    • Lymphocytes, Absolute 08/23/2021 1.43    • Monocytes, Absolute 08/23/2021 0.74    • Eosinophils, Absolute 08/23/2021 0.16    • Basophils, Absolute 08/23/2021 0.04    • Immature Grans, Absolute 08/23/2021 0.01    • nRBC 08/23/2021 0.0         Results from last 7 days   Lab Units 08/27/21  1204   SODIUM mmol/L 134*   POTASSIUM mmol/L 4.9   CHLORIDE mmol/L 99   CO2 mmol/L 23.0   BUN mg/dL 23   CREATININE mg/dL 1.61*   GLUCOSE mg/dL 292*   CALCIUM mg/dL 8.5*         Results from last 7 days   Lab Units 08/27/21  1204   WBC 10*3/mm3 6.40   HEMOGLOBIN g/dL 13.6   HEMATOCRIT % 39.6   PLATELETS 10*3/mm3 183         Results from last 7 days   Lab Units 08/27/21  1204   MAGNESIUM mg/dL 2.1           Invalid input(s): LDLCALC                Assessment:          Diagnosis Plan   1. CAD S/P percutaneous coronary angioplasty     2. Coronary artery disease due to lipid rich plaque     3. Essential hypertension     4. Mixed hyperlipidemia            Plan:         1. CAD S/P percutaneous coronary angioplasty  Repeat  revascularization of the circumflex successful.    2. Coronary artery disease due to lipid rich plaque  Clinically silent currently.  Continue medical therapy secondary prevention    3. Essential hypertension  Well-controlled on medical therapy    4. Mixed hyperlipidemia  Stable on medical therapy       Gonzalez Ochoa MD  09/01/21  .

## 2021-09-16 ENCOUNTER — TELEPHONE (OUTPATIENT)
Dept: CARDIOLOGY | Facility: CLINIC | Age: 76
End: 2021-09-16

## 2021-09-16 NOTE — TELEPHONE ENCOUNTER
Pt received 3 stents last month & said Dr. Al told him that he could stop the ASA after 1 month, he just wants to verify with Dr. Ochoa that is what he wants him to do.

## 2022-03-09 ENCOUNTER — OFFICE VISIT (OUTPATIENT)
Dept: CARDIOLOGY | Facility: CLINIC | Age: 77
End: 2022-03-09

## 2022-03-09 VITALS
BODY MASS INDEX: 33.6 KG/M2 | RESPIRATION RATE: 20 BRPM | HEART RATE: 74 BPM | SYSTOLIC BLOOD PRESSURE: 110 MMHG | WEIGHT: 240 LBS | HEIGHT: 71 IN | OXYGEN SATURATION: 96 % | DIASTOLIC BLOOD PRESSURE: 60 MMHG

## 2022-03-09 DIAGNOSIS — I10 ESSENTIAL HYPERTENSION: ICD-10-CM

## 2022-03-09 DIAGNOSIS — I20.8 ANGINA AT REST: ICD-10-CM

## 2022-03-09 DIAGNOSIS — E78.2 MIXED HYPERLIPIDEMIA: ICD-10-CM

## 2022-03-09 DIAGNOSIS — I25.10 CAD S/P PERCUTANEOUS CORONARY ANGIOPLASTY: Primary | ICD-10-CM

## 2022-03-09 DIAGNOSIS — Z98.61 CAD S/P PERCUTANEOUS CORONARY ANGIOPLASTY: Primary | ICD-10-CM

## 2022-03-09 PROCEDURE — 99214 OFFICE O/P EST MOD 30 MIN: CPT | Performed by: INTERNAL MEDICINE

## 2022-03-09 NOTE — PROGRESS NOTES
Subjective:     Encounter Date:03/09/2022      Patient ID: Carroll Rodgers is a 76 y.o. male.    Chief Complaint:  Chief Complaint   Patient presents with   • Coronary Artery Disease   • Hypertension   • Hyperlipidemia       HPI:  Carroll is a very pleasant 75-year-old patient initially self-referred.  He is previously seen cardiologist in [facility].   He has known coronary artery disease and is status post coronary artery bypass grafting in 1996 with a LIMA to the LAD and SVG to the RCA and circumflex artery.  He subsequently had total occlusion of his SVG graft to the obtuse marginal branch and underwent stenting with a drug-eluting stent 7/1/2015 as well as his SVG graft to the right coronary artery.  Subsequently he developed exertional and rest angina with his worst symptom being exertional dyspnea.  Cardiac catheterization performed 1/30/2018 showed critical stenosis of the jailed segment of the inferior branch of the circumflex obtuse marginal branch with what appeared to be 70% in-stent restenosis of the superior parent limb of the circumflex into the superior limb.  He was told that medical therapy was his best option.  Since then he has had worsening angina on Ranexa and worsening dyspnea.    He came for second opinion.    I personally reviewed the cardiac catheterization images from 1/30/2018 and confirmed the above results.  In addition he has a patent LIMA to the LAD with a distal apical 80% narrowing that is inconsequential.  He has a chronic total occlusion of his SVG graft to the his right coronary artery and again an occlusion of his SVG to the OM.  The circumflex OM native vessel is as described above.  His ECG last visit in the office showed normal sinus rhythm with APCs and a old left bundle branch block.      His chest pain syndrome was that of exertional angina often if not always associated with dyspnea with chronic stable angina class III symptoms versus unstable angina given the  increase in frequency.  He does not have associated nausea or diaphoresis.    He underwent PCI and stenting of the circumflex artery 9/9/2019 with an excellent angiographic result with restoration of MONIE-3 flow.  Since then he had done well.    He was recently diagnosed with new onset paroxysmal atrial fibrillation.  I personally reviewed his echo from 8/23/2019 which showed normal LV systolic function with diastolic grade 1A impaired relaxation normal RV function with no significant valvular heart disease.  He was loaded with amiodarone which chemically converted him to normal sinus rhythm.  He felt a little bit better since then. He had pulmonary function tests and thyroid function test sent.  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  He was therefore taken off amiodarone started on levothyroxine and maintained on metoprolol and Xarelto for anticoagulation.    Earlier this year he returned with some shortness of breath and chest tightness with extreme exertion.  I personally reviewed his echocardiogram from 2/28/2020 which shows normal LV systolic function with ejection fraction of 55 to 60% and underlying grade 1A diastolic dysfunction with no significant valvular heart disease.  He underwent cardiac catheterization which revealed critical in-stent restenosis of the superior limb of the obtuse marginal branch previously stented.  He underwent PCI and stenting 8/13/2020.    Unfortunately he returned after only 4 months with identical symptoms of chest tightness and shortness of breath.  I took him off of his Imdur because he was doing well.  I also took him off of Imdur and ranolazine because he was doing well.    Recently his angina was unstable in its frequency, it is controlled with nitroglycerin.  It occurs only with exertion and does not occur at rest.  He has no associated diaphoresis or nausea.  We tried to optimize medical therapy for angina  pectoris.  We initiated antianginals as follows: Imdur 90 mg p.o. daily with ranolazine 1000 mg p.o. twice daily.  But [facility] got [facility].    Once again we revascularized his left main ostium and circumflex.  It restenosed and is no longer revascularizable.  He returns today with chronic stable angina class I-II    The following portions of the patient's history were reviewed and updated as appropriate: allergies, current medications, past family history, past medical history, past social history, past surgical history and problem list.    Problem List:  Patient Active Problem List   Diagnosis   • Type 2 diabetes mellitus without complication, with long-term current use of insulin ([facility])   • Mixed hyperlipidemia   • Essential hypertension   • Acquired hypothyroidism   • CAD S/P percutaneous coronary angioplasty   • New onset atrial fibrillation ([facility])   • Angina at rest ([facility])       Past Medical History:  Past Medical History:   Diagnosis Date   • Coronary artery disease    • Diabetes mellitus ([facility])    • Hyperlipidemia    • Hypertension    • Hypothyroidism    • New onset atrial fibrillation ([facility]) 1/11/2020   • Stroke ([facility])     X2   • Unstable angina ([facility]) 8/22/2019       Past Surgical History:  Past Surgical History:   Procedure Laterality Date   • CARDIAC CATHETERIZATION  01/30/2018    EF 40%, Distal LM 50% stenosis, Proximal LM 50% stenosis, Proximal LAD 95% stenosis, Mid % stenosis, Apical LAD 70% stenosis, Significant ISR at bifurcation, 90% stenosis at circumflex portoin, 90% stenosis at ostial OM, distal circumflex 80% stenosis, Mid % stenosis, SVG to % occluded at ostium, SVG to OM branches 100% occluded, LIMA to LAD has no significant disease   • CARDIAC CATHETERIZATION N/A 9/9/2019    PCI to OM1 and OM2 of LCX using overlapping Xience LARISA' and [place] LARISA. PCI to LM into distal LM and ostial proximal circ using Xience LARISA. Laser atherectomy.   • CARDIAC  CATHETERIZATION N/A 9/9/2019    Procedure: Atherectomy-coronary;  Surgeon: Gonzalez Ochoa MD;  Location:  [facility] CATH INVASIVE LOCATION;  Service: Cardiology   • CARDIAC CATHETERIZATION N/A 8/13/2020    Procedure: Left Heart Cath;  Surgeon: Gonzalez Ochoa MD;  Location: [facility] CATH INVASIVE LOCATION;  Service: Cardiology;  Laterality: N/A;   • CARDIAC CATHETERIZATION N/A 8/13/2020    Procedure: Percutaneous Coronary Intervention;  Surgeon: Gonzalez Ochoa MD;  Location: [facility] CATH INVASIVE LOCATION;  Service: Cardiology;  Laterality: N/A;  PCI  Left Main PTCA/LARISA  PCI Circx/OM  PTCA/LARISA   • CARDIAC CATHETERIZATION N/A 8/26/2021    Procedure: Left Heart Cath;  Surgeon: Gonzalez Ochoa MD;  Location: [facility] CATH INVASIVE LOCATION;  Service: Cardiology;  Laterality: N/A;   • CARDIAC CATHETERIZATION N/A 8/26/2021    Procedure: Percutaneous Coronary Intervention;  Surgeon: Gonzalez Ochoa MD;  Location: [facility] CATH INVASIVE LOCATION;  Service: Cardiology;  Laterality: N/A;   • CARDIAC CATHETERIZATION N/A 8/26/2021    Procedure: Stent LARISA coronary;  Surgeon: Gonzalez Ochoa MD;  Location: [facility] CATH INVASIVE LOCATION;  Service: Cardiology;  Laterality: N/A;   • CARDIAC CATHETERIZATION N/A 8/26/2021    Procedure: Atherectomy-coronary;  Surgeon: Gonzalez Ochoa MD;  Location: [facility] CATH INVASIVE LOCATION;  Service: Cardiology;  Laterality: N/A;  laser   • CORONARY ARTERY BYPASS GRAFT  1996    X3   • CORONARY STENT PLACEMENT  07/01/2015    Xience [place] LARISA to RCA (X2) and Mid Circ (X1)-Dr. Rodriguez   • CORONARY STENT PLACEMENT  2006   • CORONARY STENT PLACEMENT  2010       Social History:  Social History     Socioeconomic History   • Marital status:    Tobacco Use   • Smoking status: Former Smoker   • Smokeless tobacco: Never Used   • Tobacco comment: 40 years ago   Substance and Sexual Activity   • Alcohol use: Not Currently   • Drug  "use: No   • Sexual activity: Defer       Allergies:  Allergies   Allergen Reactions   • Peanut-Containing Drug Products            Review of Symptoms:  Constitutional: Patient afebrile no chills or unexpected weight changes  Respiratory: No cough, no wheezing or dyspnea  Cardiovascular: Today the patient complains of stable chest pain, no palpitations, dyspnea, orthopnea and no edema  Gastrointestinal: No nausea, vomiting, constipation or diarrhea.  No melena or dark stools    All other systems reviewed and are negative           Objective:         /60   Pulse 74   Resp 20   Ht 180.3 cm (71\")   Wt 109 kg (240 lb)   SpO2 96%   BMI 33.47 kg/m²       Physical exam  Constitutional: well-nourished, and appears stated age in no acute distress  PERRL: Conjunctiva clear, no pallor, anicteric  HENMT: normocephalic, normal dentition, no cyanosis or pallor  Neck:no bruits, or thrills and bilateral normal carotid upstroke. Normal jugular venous pressure  Cardiovascular: No parasternal heaves an non-displaced focal PMI. Normal rate and rhythm: no rub, gallop, murmur or click and normal S1 and S2; no lower or upper extremity edema.   Lungs: unlabored, no wheezing with no rales or rhonchi on auscultation.  Extremities: Warm, no clubbing, cyanosis. Full and equal peripheral pulses in extremities with no bruits appreciated.   Abdomen: soft, non-tender, non-distended  Musculoskeletal: no joint tenderness or swelling and no erythema  Skin: Warm and dry, non-erythematous   Neuro:alert and normal affect. Oriented to time, place and person.       In-Office Procedure(s):  Procedures    ASCVD RIsk Score::  The ASCVD Risk score (Blandford DC Jr., et al., 2013) failed to calculate for the following reasons:    Cannot find a previous HDL lab    Cannot find a previous total cholesterol lab    Recent Radiology:  Imaging Results (Most Recent)     None          Lab Review:   No visits with results within 2 Month(s) from this visit. "   Latest known visit with results is:   Admission on 08/26/2021, Discharged on 08/27/2021   Component Date Value   • Glucose 08/26/2021 225 (A)   • Activated Clotting Time  08/26/2021 307 (A)   • Activated Clotting Time  08/26/2021 307 (A)   • Activated Clotting Time  08/26/2021 235 (A)   • Glucose 08/26/2021 340 (A)   • Glucose 08/26/2021 405 (A)   • Glucose 08/27/2021 164 (A)   • Glucose 08/27/2021 220 (A)   • Glucose 08/27/2021 292 (A)   • BUN 08/27/2021 23    • Creatinine 08/27/2021 1.61 (A)   • Sodium 08/27/2021 134 (A)   • Potassium 08/27/2021 4.9    • Chloride 08/27/2021 99    • CO2 08/27/2021 23.0    • Calcium 08/27/2021 8.5 (A)   • eGFR Non  Amer 08/27/2021 42 (A)   • BUN/Creatinine Ratio 08/27/2021 14.3    • Anion Gap 08/27/2021 12.0    • Magnesium 08/27/2021 2.1    • WBC 08/27/2021 6.40    • RBC 08/27/2021 4.29    • Hemoglobin 08/27/2021 13.6    • Hematocrit 08/27/2021 39.6    • MCV 08/27/2021 92.3    • MCH 08/27/2021 31.6    • MCHC 08/27/2021 34.3    • RDW 08/27/2021 13.3    • RDW-SD 08/27/2021 42.4    • MPV 08/27/2021 8.9    • Platelets 08/27/2021 183    • Neutrophil % 08/27/2021 67.2    • Lymphocyte % 08/27/2021 19.6    • Monocyte % 08/27/2021 10.9    • Eosinophil % 08/27/2021 1.6    • Basophil % 08/27/2021 0.7    • Neutrophils, Absolute 08/27/2021 4.30    • Lymphocytes, Absolute 08/27/2021 1.20    • Monocytes, Absolute 08/27/2021 0.70    • Eosinophils, Absolute 08/27/2021 0.10    • Basophils, Absolute 08/27/2021 0.00    • nRBC 08/27/2021 0.0    • Glucose 08/27/2021 284 (A)              Invalid input(s): ALKPO4                        Invalid input(s): LDLCALC                Assessment:          Diagnosis Plan   1. CAD S/P percutaneous coronary angioplasty     2. Essential hypertension     3. Mixed hyperlipidemia     4. Angina at rest (HCC)            Plan:         1. CAD S/P percutaneous coronary angioplasty  Continue antianginal therapy.    2. Essential hypertension  Stable on medical  therapy    3. Mixed hyperlipidemia  Stable    4. Angina at rest (HCC)  Stable angina class I-II         Gonzalez Ochoa MD  03/09/22  .

## 2023-04-28 ENCOUNTER — TRANSCRIBE ORDERS (OUTPATIENT)
Dept: ADMINISTRATIVE | Facility: HOSPITAL | Age: 78
End: 2023-04-28
Payer: MEDICARE

## 2023-04-28 ENCOUNTER — LAB (OUTPATIENT)
Dept: LAB | Facility: HOSPITAL | Age: 78
End: 2023-04-28
Payer: MEDICARE

## 2023-04-28 DIAGNOSIS — I20.0 UNSTABLE ANGINA: Primary | ICD-10-CM

## 2023-04-28 DIAGNOSIS — I20.0 UNSTABLE ANGINA: ICD-10-CM

## 2023-04-28 LAB
HOLD SPECIMEN: NORMAL
TROPONIN T SERPL HS-MCNC: 27 NG/L
WHOLE BLOOD HOLD COAG: NORMAL
WHOLE BLOOD HOLD SPECIMEN: NORMAL

## 2023-04-28 PROCEDURE — 36415 COLL VENOUS BLD VENIPUNCTURE: CPT

## 2023-04-28 PROCEDURE — 84484 ASSAY OF TROPONIN QUANT: CPT

## 2024-01-01 ENCOUNTER — TRANSCRIBE ORDERS (OUTPATIENT)
Dept: GENERAL RADIOLOGY | Facility: HOSPITAL | Age: 79
End: 2024-01-01
Payer: MEDICARE

## 2024-01-01 ENCOUNTER — HOSPITAL ENCOUNTER (EMERGENCY)
Facility: HOSPITAL | Age: 79
End: 2024-01-03
Attending: EMERGENCY MEDICINE
Payer: MEDICARE

## 2024-01-01 ENCOUNTER — TRANSCRIBE ORDERS (OUTPATIENT)
Dept: LAB | Facility: HOSPITAL | Age: 79
End: 2024-01-01
Payer: MEDICARE

## 2024-01-01 ENCOUNTER — HOSPITAL ENCOUNTER (OUTPATIENT)
Dept: GENERAL RADIOLOGY | Facility: HOSPITAL | Age: 79
Discharge: HOME | End: 2024-01-02
Payer: MEDICARE

## 2024-01-01 ENCOUNTER — LAB (OUTPATIENT)
Dept: LAB | Facility: HOSPITAL | Age: 79
End: 2024-01-01
Payer: MEDICARE

## 2024-01-01 DIAGNOSIS — I46.9 CARDIOPULMONARY ARREST: Primary | ICD-10-CM

## 2024-01-01 DIAGNOSIS — D59.12: Primary | ICD-10-CM

## 2024-01-01 DIAGNOSIS — J15.7 PNEUMONIA DUE TO MYCOPLASMA PNEUMONIAE, UNSPECIFIED LATERALITY, UNSPECIFIED PART OF LUNG: ICD-10-CM

## 2024-01-01 DIAGNOSIS — J15.7: Primary | ICD-10-CM

## 2024-01-01 DIAGNOSIS — E03.9 ACQUIRED HYPOTHYROIDISM: Primary | ICD-10-CM

## 2024-01-01 DIAGNOSIS — E03.9 ACQUIRED HYPOTHYROIDISM: ICD-10-CM

## 2024-01-01 LAB
ALBUMIN SERPL-MCNC: 3.8 G/DL (ref 3.5–5.2)
ALBUMIN/GLOB SERPL: 1.1 G/DL
ALP SERPL-CCNC: 144 U/L (ref 39–117)
ALT SERPL W P-5'-P-CCNC: 6 U/L (ref 1–41)
ANION GAP SERPL CALCULATED.3IONS-SCNC: 16.7 MMOL/L (ref 5–15)
AST SERPL-CCNC: 12 U/L (ref 1–40)
BILIRUB SERPL-MCNC: 0.6 MG/DL (ref 0–1.2)
BUN SERPL-MCNC: 32 MG/DL (ref 8–23)
BUN/CREAT SERPL: 12.2 (ref 7–25)
CALCIUM SPEC-SCNC: 9.2 MG/DL (ref 8.6–10.5)
CHLORIDE SERPL-SCNC: 97 MMOL/L (ref 98–107)
CO2 SERPL-SCNC: 24.3 MMOL/L (ref 22–29)
CREAT SERPL-MCNC: 2.63 MG/DL (ref 0.76–1.27)
EGFRCR SERPLBLD CKD-EPI 2021: 24 ML/MIN/1.73
GLOBULIN UR ELPH-MCNC: 3.4 GM/DL
GLUCOSE BLDC GLUCOMTR-MCNC: 340 MG/DL (ref 70–105)
GLUCOSE SERPL-MCNC: 230 MG/DL (ref 65–99)
POTASSIUM SERPL-SCNC: 5.1 MMOL/L (ref 3.5–5.2)
PROT SERPL-MCNC: 7.2 G/DL (ref 6–8.5)
SODIUM SERPL-SCNC: 138 MMOL/L (ref 136–145)
T-UPTAKE NFR SERPL: 1.01 TBI (ref 0.8–1.3)
T4 SERPL-MCNC: 7.97 MCG/DL (ref 4.5–11.7)
TSH SERPL DL<=0.05 MIU/L-ACNC: 6.86 UIU/ML (ref 0.27–4.2)

## 2024-01-01 PROCEDURE — 84436 ASSAY OF TOTAL THYROXINE: CPT

## 2024-01-01 PROCEDURE — 31500 INSERT EMERGENCY AIRWAY: CPT

## 2024-01-01 PROCEDURE — 25010000002 EPINEPHRINE 1 MG/10ML SOLUTION PREFILLED SYRINGE: Performed by: EMERGENCY MEDICINE

## 2024-01-01 PROCEDURE — 80047 BASIC METABLC PNL IONIZED CA: CPT

## 2024-01-01 PROCEDURE — 36415 COLL VENOUS BLD VENIPUNCTURE: CPT

## 2024-01-01 PROCEDURE — 82948 REAGENT STRIP/BLOOD GLUCOSE: CPT

## 2024-01-01 PROCEDURE — 94799 UNLISTED PULMONARY SVC/PX: CPT

## 2024-01-01 PROCEDURE — 92950 HEART/LUNG RESUSCITATION CPR: CPT

## 2024-01-01 PROCEDURE — 84443 ASSAY THYROID STIM HORMONE: CPT

## 2024-01-01 PROCEDURE — 80053 COMPREHEN METABOLIC PANEL: CPT

## 2024-01-01 PROCEDURE — 71046 X-RAY EXAM CHEST 2 VIEWS: CPT

## 2024-01-01 PROCEDURE — 25010000002 AMIODARONE PER 30 MG: Performed by: EMERGENCY MEDICINE

## 2024-01-01 PROCEDURE — 99285 EMERGENCY DEPT VISIT HI MDM: CPT

## 2024-01-01 PROCEDURE — 84479 ASSAY OF THYROID (T3 OR T4): CPT

## 2024-01-01 PROCEDURE — 85014 HEMATOCRIT: CPT

## 2024-01-01 RX ORDER — CALCIUM CHLORIDE 100 MG/ML
INJECTION INTRAVENOUS; INTRAVENTRICULAR
Status: COMPLETED | OUTPATIENT
Start: 2024-01-01 | End: 2024-01-01

## 2024-01-01 RX ORDER — AMIODARONE HYDROCHLORIDE 50 MG/ML
INJECTION, SOLUTION INTRAVENOUS
Status: COMPLETED | OUTPATIENT
Start: 2024-01-01 | End: 2024-01-01

## 2024-01-01 RX ADMIN — EPINEPHRINE 1 MG: 0.1 INJECTION, SOLUTION ENDOTRACHEAL; INTRACARDIAC; INTRAVENOUS at 01:51

## 2024-01-01 RX ADMIN — LIDOCAINE HYDROCHLORIDE 100 MG: 20 INJECTION, SOLUTION INTRAVENOUS at 02:00

## 2024-01-01 RX ADMIN — AMIODARONE HYDROCHLORIDE 300 MG: 50 INJECTION, SOLUTION INTRAVENOUS at 01:55

## 2024-01-01 RX ADMIN — SODIUM BICARBONATE 50 MEQ: 84 INJECTION, SOLUTION INTRAVENOUS at 02:12

## 2024-01-01 RX ADMIN — CALCIUM CHLORIDE 1 G: 100 INJECTION, SOLUTION INTRAVENOUS at 02:00

## 2024-01-01 RX ADMIN — EPINEPHRINE 1 MG: 0.1 INJECTION, SOLUTION ENDOTRACHEAL; INTRACARDIAC; INTRAVENOUS at 02:01

## 2024-01-01 RX ADMIN — EPINEPHRINE 1 MG: 0.1 INJECTION, SOLUTION ENDOTRACHEAL; INTRACARDIAC; INTRAVENOUS at 01:56

## 2024-01-01 RX ADMIN — EPINEPHRINE 1 MG: 0.1 INJECTION, SOLUTION ENDOTRACHEAL; INTRACARDIAC; INTRAVENOUS at 02:04

## 2024-01-01 RX ADMIN — AMIODARONE HYDROCHLORIDE 150 MG: 50 INJECTION, SOLUTION INTRAVENOUS at 02:08

## 2024-01-01 RX ADMIN — SODIUM BICARBONATE 50 MEQ: 84 INJECTION, SOLUTION INTRAVENOUS at 02:07

## 2024-01-01 RX ADMIN — SODIUM BICARBONATE 50 MEQ: 84 INJECTION, SOLUTION INTRAVENOUS at 02:00

## 2024-01-01 RX ADMIN — EPINEPHRINE 1 MG: 0.1 INJECTION, SOLUTION ENDOTRACHEAL; INTRACARDIAC; INTRAVENOUS at 02:09

## 2024-01-03 NOTE — ED NOTES
Pts upper dentures, wallet, shoes and socks taken down to morgue with patient in a labeled patient belonging bag and given to security in the morgue.

## 2024-01-03 NOTE — ED PROVIDER NOTES
Subjective   History of Present Illness  Chief complaint: Patient is 78-year-old who was brought into the ED and driven by family.  He had been having shortness of breath that per report from family have been progressively worsening over a month.  Really bad over the last couple days and severe this evening.  He had seen  his cardiologist and received chest x-ray earlier in the day at [facility].  Things became severe tonight and in transport he stopped breathing.  On arrival to the ER he is in cardiac arrest.  He is brought back to the trauma bay with chest compressions and supplemental oxygenation with bag initiated already.  Patient has no cardiac history.  He is defibrillator in place and has multiple coronary stents in the past.    Context:    Duration:    Timing:    Severity:    Associated Symptoms:        PCP:  LMP:      Review of Systems   Unable to perform ROS: Acuity of condition       Past Medical History:   Diagnosis Date    Coronary artery disease     Diabetes mellitus     Hyperlipidemia     Hypertension     Hypothyroidism     New onset atrial fibrillation 1/11/2020    Stroke     X2    Unstable angina 8/22/2019       Allergies   Allergen Reactions    Peanut-Containing Drug Products        Past Surgical History:   Procedure Laterality Date    CARDIAC CATHETERIZATION  01/30/2018    EF 40%, Distal LM 50% stenosis, Proximal LM 50% stenosis, Proximal LAD 95% stenosis, Mid % stenosis, Apical LAD 70% stenosis, Significant ISR at bifurcation, 90% stenosis at circumflex portoin, 90% stenosis at ostial OM, distal circumflex 80% stenosis, Mid % stenosis, SVG to % occluded at ostium, SVG to OM branches 100% occluded, LIMA to LAD has no significant disease    CARDIAC CATHETERIZATION N/A 9/9/2019    PCI to OM1 and OM2 of LCX using overlapping Xience LARISA' and [place] LARISA. PCI to LM into distal LM and ostial proximal circ using Xience LARISA. Laser atherectomy.    CARDIAC CATHETERIZATION N/A  9/9/2019    Procedure: Atherectomy-coronary;  Surgeon: Gonzalez Ochoa MD;  Location:  [facility] CATH INVASIVE LOCATION;  Service: Cardiology    CARDIAC CATHETERIZATION N/A 8/13/2020    Procedure: Left Heart Cath;  Surgeon: Gonzalez Ochoa MD;  Location:  [facility]ATH INVASIVE LOCATION;  Service: Cardiology;  Laterality: N/A;    CARDIAC CATHETERIZATION N/A 8/13/2020    Procedure: Percutaneous Coronary Intervention;  Surgeon: Gonzalez Ochoa MD;  Location: [facility] CATH INVASIVE LOCATION;  Service: Cardiology;  Laterality: N/A;  PCI  Left Main PTCA/LARISA  PCI Circx/OM  PTCA/LARISA    CARDIAC CATHETERIZATION N/A 8/26/2021    Procedure: Left Heart Cath;  Surgeon: Gonzalez Ochoa MD;  Location: [facility] CATH INVASIVE LOCATION;  Service: Cardiology;  Laterality: N/A;    CARDIAC CATHETERIZATION N/A 8/26/2021    Procedure: Percutaneous Coronary Intervention;  Surgeon: Gonzalez Ochoa MD;  Location: [facility] CATH INVASIVE LOCATION;  Service: Cardiology;  Laterality: N/A;    CARDIAC CATHETERIZATION N/A 8/26/2021    Procedure: Stent LARISA coronary;  Surgeon: Gonzalez cOhoa MD;  Location: [facility] CATH INVASIVE LOCATION;  Service: Cardiology;  Laterality: N/A;    CARDIAC CATHETERIZATION N/A 8/26/2021    Procedure: Atherectomy-coronary;  Surgeon: Gonzalez Ochoa MD;  Location: [facility] CATH INVASIVE LOCATION;  Service: Cardiology;  Laterality: N/A;  laser    CORONARY ARTERY BYPASS GRAFT  1996    X3    CORONARY STENT PLACEMENT  07/01/2015    Xience [place] LARISA to RCA (X2) and Mid Circ (X1)-Dr. Rodriguez    CORONARY STENT PLACEMENT  2006    CORONARY STENT PLACEMENT  2010       Family History   Problem Relation Age of Onset    Cancer Mother     Heart valve disorder Father        Social History     Socioeconomic History    Marital status:    Tobacco Use    Smoking status: Former    Smokeless tobacco: Never    Tobacco comments:     40 years ago   Substance and Sexual  Activity    Alcohol use: Not Currently    Drug use: No    Sexual activity: Defer           Objective   Physical Exam  Vitals and nursing note reviewed.   Constitutional:       Appearance: He is obese.      Comments: Unresponsive   HENT:      Head: Normocephalic and atraumatic.   Eyes:      Comments: Pupils not responsive.   Cardiovascular:      Comments: Pulses present with chest compressions only.  Pulmonary:      Comments: Bilateral breath sounds with bagging  Abdominal:      Palpations: Abdomen is soft.   Neurological:      Comments: Unresponsive   Psychiatric:      Comments: unresponsive         Intubation    Date/Time: 1/8/2024 9:17 AM    Performed by: Chepe Lisa DO  Authorized by: Chepe Lisa DO    Consent:     Consent obtained:  Emergent situation  Pre-procedure details:     Indications: cardio/pulmonary arrest    Procedure details:     Preoxygenation:  Bag valve mask    CPR in progress: yes      Number of attempts:  1  Successful intubation attempt details:     Intubation method:  Oral    Intubation technique: video assisted      Laryngoscope blade:  Hypercurved    Bougie used: no      Tube size (mm):  7.5    Tube type:  Cuffed  Placement assessment:     Breath sounds:  Equal    Placement verification: chest rise, colorimetric ETCO2, direct visualization and equal breath sounds    Post-procedure details:     Procedure completion:  Tolerated well, no immediate complications             ED Course                                             Medical Decision Making  Patient was seen and evaluated for the above problem    Differential diagnosis includes but is not limited to V. tach, cardiac arrhythmia, ACS, PE, pneumothorax    Patient presented in episodes of V. tach/V-fib.  His defibrillator was cardioverting him.  He was emergently intubated by myself and we defibrillated him multiple times.  There is never any spontaneous return of circulation.  He did go into PEA inevitably.  See code  sheet.  At the bedside I did perform ultrasound.  Initially minimal cardiac activity.  However 2:18 AM after well over 20 minutes of ACLS patient had no cardiac activity.  No respirations.  No pulses and no corneal reflex.  At 2:18 AM time of death was called.  I did discuss with the family and the patient had been compliant with Eliquis and Plavix.  Secondary to this not likely to be a pulmonary embolism.  He did present in V-fib/V. tach during his initial cardiac arrest and this potentially could have been the cause of the cardiac arrest.  I discussed with family answered questions.    Problems Addressed:  Cardiopulmonary arrest: complicated acute illness or injury    Amount and/or Complexity of Data Reviewed  Labs: ordered.    Risk  Prescription drug management.        Final diagnoses:   None   Cardiopulmonary arrest    ED Disposition  ED Disposition       None            No follow-up provider specified.       Medication List      No changes were made to your prescriptions during this visit.            Chepe Lisa,   01/03/24 0226       Chepe Lisa,   01/08/24 [id number]

## 2024-01-03 NOTE — ED NOTES
Patient belongings including cut blue jeans, cut t shirt and cut flannel shirt, patient gold watch put in patient belonging bag and handed to family

## 2024-01-04 LAB
ANION GAP SERPL CALCULATED.3IONS-SCNC: 16 MMOL/L (ref 10–20)
BUN BLDA-MCNC: 39 MG/DL (ref 8–26)
CA-I BLDA-SCNC: 1.54 MMOL/L (ref 1.12–1.32)
CHLORIDE BLDA-SCNC: 99 MMOL/L (ref 98–109)
CO2 BLDA-SCNC: 25 MMOL/L (ref 24–29)
CREAT BLDA-MCNC: 2.9 MG/DL (ref 0.6–1.3)
EGFRCR SERPLBLD CKD-EPI 2021: 21.3 ML/MIN/1.73
GLUCOSE BLDC GLUCOMTR-MCNC: 470 MG/DL (ref 70–105)
HCT VFR BLDA CALC: 42 % (ref 38–51)
HGB BLDA-MCNC: 14.3 G/DL (ref 12–17)
POTASSIUM BLDA-SCNC: 3.9 MMOL/L (ref 3.5–4.9)
SODIUM BLD-SCNC: 135 MMOL/L (ref 138–146)

## (undated) DEVICE — ELECTRD DEFIB M/FUNC PROPADZ RADIOL 2PK

## (undated) DEVICE — CONTRST ISOVUE300 61PCT 50ML

## (undated) DEVICE — MICROSURGICAL DILATATION DEVICE: Brand: WOLVERINE CORONARY CUTTING BALLOON

## (undated) DEVICE — CATH GUIDE LAUNCHER EBU3.5 6F 100CM

## (undated) DEVICE — PINNACLE INTRODUCER SHEATH: Brand: PINNACLE

## (undated) DEVICE — CATH GUIDE LAUNCHER EBU4.0 6F 100CM

## (undated) DEVICE — PK TRY HEART CATH 50

## (undated) DEVICE — DEV INFL COMPAK W/ACCESSPLUS IN4530

## (undated) DEVICE — XIENCE SIERRA™ EVEROLIMUS ELUTING CORONARY STENT SYSTEM 3.00 MM X 38 MM / RAPID-EXCHANGE
Type: IMPLANTABLE DEVICE | Status: NON-FUNCTIONAL
Brand: XIENCE SIERRA™

## (undated) DEVICE — SYR LL TP 10ML STRL

## (undated) DEVICE — ST ACC MICROPUNCTURE STFF/CANN PLAT/TP 4F 21G 40CM

## (undated) DEVICE — CATH DIAG IMPULSE FR4 6F 100CM

## (undated) DEVICE — MINI TREK CORONARY DILATATION CATHETER 1.50 MM X 12 MM / RAPID-EXCHANGE: Brand: MINI TREK

## (undated) DEVICE — CATH MPAC STP 6F: Brand: SUPER TORQUE

## (undated) DEVICE — XIENCE SIERRA™ EVEROLIMUS ELUTING CORONARY STENT SYSTEM 4.00 MM X 23 MM / RAPID-EXCHANGE
Type: IMPLANTABLE DEVICE | Site: CORONARY | Status: NON-FUNCTIONAL
Brand: XIENCE SIERRA™

## (undated) DEVICE — STPCK 3WY HP ROT

## (undated) DEVICE — BALN NC/EUPHORA RX 2.50X20MM

## (undated) DEVICE — PERCLOSE PROGLIDE™ SUTURE-MEDIATED CLOSURE SYSTEM: Brand: PERCLOSE PROGLIDE™

## (undated) DEVICE — BOWL PLSTC MD 16OZ BLU STRL

## (undated) DEVICE — NC TREK CORONARY DILATATION CATHETER 3.5 MM X 12 MM / RAPID-EXCHANGE: Brand: NC TREK

## (undated) DEVICE — NC TREK™ CORONARY DILATATION CATHETER 2.25 MM X 15 MM / RAPID-EXCHANGE: Brand: NC TREK™

## (undated) DEVICE — CATH GUIDE LAUNCHER EBU4 7F

## (undated) DEVICE — GW PTFE EMERALD HEPCOAT FC J TIP STD .035 3MM 150CM

## (undated) DEVICE — HI-TORQUE WHISPER MS GUIDE WIRE .014 STRAIGHT TIP 3.0 CM X 190 CM: Brand: HI-TORQUE WHISPER

## (undated) DEVICE — ANGIO-SEAL VIP VASCULAR CLOSURE DEVICE: Brand: ANGIO-SEAL

## (undated) DEVICE — HI-TORQUE WHISPER MS GUIDE WIRE .014 STRAIGHT TIP 3.0 CM X 300 CM: Brand: HI-TORQUE WHISPER

## (undated) DEVICE — SKIN PREP TRAY W/CHG: Brand: MEDLINE INDUSTRIES, INC.

## (undated) DEVICE — GUIDELINER CATHETERS ARE INTENDED TO BE USED IN CONJUNCTION WITH GUIDE CATHETERS TO ACCESS DISCRETE REGIONS OF THE CORONARY AND/OR PERIPHERAL VASCULATURE, AND TO FACILITATE PLACEMENT OF INTERVENTIONAL DEVICES.: Brand: GUIDELINER® V3 CATHETER

## (undated) DEVICE — NC TREK CORONARY DILATATION CATHETER 2.5 MM X 12 MM / RAPID-EXCHANGE: Brand: NC TREK

## (undated) DEVICE — BALN NC/EUPHORA RX 2.00X12MM

## (undated) DEVICE — MINI TREK CORONARY DILATATION CATHETER 2.0 MM X 12 MM / RAPID-EXCHANGE: Brand: MINI TREK

## (undated) DEVICE — HI-TORQUE WHISPER ES GUIDE WIRE .014 STRAIGHTTIP 3.0 CM X 190 CM: Brand: HI-TORQUE WHISPER

## (undated) DEVICE — TBG NAMIC PRESS MONTR A/ F/M 12IN

## (undated) DEVICE — NC TREK CORONARY DILATATION CATHETER 3.25 MM X 12 MM / RAPID-EXCHANGE: Brand: NC TREK

## (undated) DEVICE — Device: Brand: ANGIOSCULPT® PTCA SCORING BALLOON CATHETER

## (undated) DEVICE — ELCA™ CORONARY LASER ATHERECTOMY CATHETER: Brand: ELCA™

## (undated) DEVICE — RUNTHROUGH NS EXTRA FLOPPY PTCA GUIDEWIRE: Brand: RUNTHROUGH

## (undated) DEVICE — NC TREK CORONARY DILATATION CATHETER 2.0 MM X 15 MM / RAPID-EXCHANGE: Brand: NC TREK

## (undated) DEVICE — STNT CORNRY RESOLUTE ONYX RX 2X26MM: Type: IMPLANTABLE DEVICE | Site: CORONARY | Status: NON-FUNCTIONAL

## (undated) DEVICE — CATH DIAG IMPULSE FL4 6F 100CM

## (undated) DEVICE — NC TREK CORONARY DILATATION CATHETER 3.25 MM X 20 MM / RAPID-EXCHANGE: Brand: NC TREK

## (undated) DEVICE — MEDICINE CUP, GRADUATED, STER: Brand: MEDLINE

## (undated) DEVICE — CATH DIAG IMPULSE PIG .056 6F 110CM

## (undated) DEVICE — NC TREK CORONARY DILATATION CATHETER 2.5 MM X 20 MM / RAPID-EXCHANGE: Brand: NC TREK

## (undated) DEVICE — NC TREK CORONARY DILATATION CATHETER 3.0 MM X 20 MM / RAPID-EXCHANGE: Brand: NC TREK

## (undated) DEVICE — BALN NC/EUPHORA RX 3.00X12MM